# Patient Record
Sex: MALE | Race: BLACK OR AFRICAN AMERICAN | NOT HISPANIC OR LATINO | URBAN - METROPOLITAN AREA
[De-identification: names, ages, dates, MRNs, and addresses within clinical notes are randomized per-mention and may not be internally consistent; named-entity substitution may affect disease eponyms.]

---

## 2017-08-23 ENCOUNTER — INPATIENT (INPATIENT)
Facility: HOSPITAL | Age: 47
LOS: 5 days | Discharge: HOME | End: 2017-08-29
Attending: SURGERY | Admitting: SURGERY
Payer: COMMERCIAL

## 2017-08-23 VITALS
HEART RATE: 110 BPM | OXYGEN SATURATION: 98 % | DIASTOLIC BLOOD PRESSURE: 74 MMHG | TEMPERATURE: 101 F | SYSTOLIC BLOOD PRESSURE: 134 MMHG | RESPIRATION RATE: 20 BRPM

## 2017-08-23 DIAGNOSIS — Z98.890 OTHER SPECIFIED POSTPROCEDURAL STATES: Chronic | ICD-10-CM

## 2017-08-23 LAB
ALBUMIN SERPL ELPH-MCNC: 3.5 G/DL — SIGNIFICANT CHANGE UP (ref 3.3–5)
ALP SERPL-CCNC: 122 U/L — HIGH (ref 40–120)
ALT FLD-CCNC: 7 U/L — SIGNIFICANT CHANGE UP (ref 4–41)
APTT BLD: 27.6 SEC — SIGNIFICANT CHANGE UP (ref 27.5–37.4)
AST SERPL-CCNC: 9 U/L — SIGNIFICANT CHANGE UP (ref 4–40)
BASE EXCESS BLDV CALC-SCNC: 2.9 MMOL/L — SIGNIFICANT CHANGE UP
BASOPHILS # BLD AUTO: 0.01 K/UL — SIGNIFICANT CHANGE UP (ref 0–0.2)
BASOPHILS NFR BLD AUTO: 0.1 % — SIGNIFICANT CHANGE UP (ref 0–2)
BILIRUB SERPL-MCNC: 1 MG/DL — SIGNIFICANT CHANGE UP (ref 0.2–1.2)
BLD GP AB SCN SERPL QL: NEGATIVE — SIGNIFICANT CHANGE UP
BLOOD GAS VENOUS - CREATININE: 1.06 MG/DL — SIGNIFICANT CHANGE UP (ref 0.5–1.3)
BUN SERPL-MCNC: 8 MG/DL — SIGNIFICANT CHANGE UP (ref 7–23)
CALCIUM SERPL-MCNC: 9.2 MG/DL — SIGNIFICANT CHANGE UP (ref 8.4–10.5)
CHLORIDE BLDV-SCNC: 97 MMOL/L — SIGNIFICANT CHANGE UP (ref 96–108)
CHLORIDE SERPL-SCNC: 96 MMOL/L — LOW (ref 98–107)
CO2 SERPL-SCNC: 24 MMOL/L — SIGNIFICANT CHANGE UP (ref 22–31)
CREAT SERPL-MCNC: 1.21 MG/DL — SIGNIFICANT CHANGE UP (ref 0.5–1.3)
EOSINOPHIL # BLD AUTO: 0.03 K/UL — SIGNIFICANT CHANGE UP (ref 0–0.5)
EOSINOPHIL NFR BLD AUTO: 0.2 % — SIGNIFICANT CHANGE UP (ref 0–6)
GAS PNL BLDV: 135 MMOL/L — LOW (ref 136–146)
GLUCOSE BLDV-MCNC: 390 — HIGH (ref 70–99)
GLUCOSE SERPL-MCNC: 367 MG/DL — HIGH (ref 70–99)
HCO3 BLDV-SCNC: 24 MMOL/L — SIGNIFICANT CHANGE UP (ref 20–27)
HCT VFR BLD CALC: 42 % — SIGNIFICANT CHANGE UP (ref 39–50)
HCT VFR BLDV CALC: 43.9 % — SIGNIFICANT CHANGE UP (ref 39–51)
HGB BLD-MCNC: 13.7 G/DL — SIGNIFICANT CHANGE UP (ref 13–17)
HGB BLDV-MCNC: 14.3 G/DL — SIGNIFICANT CHANGE UP (ref 13–17)
IMM GRANULOCYTES # BLD AUTO: 0.16 # — SIGNIFICANT CHANGE UP
IMM GRANULOCYTES NFR BLD AUTO: 1.2 % — SIGNIFICANT CHANGE UP (ref 0–1.5)
INR BLD: 1.26 — HIGH (ref 0.88–1.17)
LACTATE BLDV-MCNC: 3.2 MMOL/L — HIGH (ref 0.5–2)
LYMPHOCYTES # BLD AUTO: 1.02 K/UL — SIGNIFICANT CHANGE UP (ref 1–3.3)
LYMPHOCYTES # BLD AUTO: 7.7 % — LOW (ref 13–44)
MCHC RBC-ENTMCNC: 28.7 PG — SIGNIFICANT CHANGE UP (ref 27–34)
MCHC RBC-ENTMCNC: 32.6 % — SIGNIFICANT CHANGE UP (ref 32–36)
MCV RBC AUTO: 88.1 FL — SIGNIFICANT CHANGE UP (ref 80–100)
MONOCYTES # BLD AUTO: 1.02 K/UL — HIGH (ref 0–0.9)
MONOCYTES NFR BLD AUTO: 7.7 % — SIGNIFICANT CHANGE UP (ref 2–14)
NEUTROPHILS # BLD AUTO: 10.98 K/UL — HIGH (ref 1.8–7.4)
NEUTROPHILS NFR BLD AUTO: 83.1 % — HIGH (ref 43–77)
NRBC # FLD: 0 — SIGNIFICANT CHANGE UP
PCO2 BLDV: 52 MMHG — HIGH (ref 41–51)
PH BLDV: 7.35 PH — SIGNIFICANT CHANGE UP (ref 7.32–7.43)
PLATELET # BLD AUTO: 166 K/UL — SIGNIFICANT CHANGE UP (ref 150–400)
PMV BLD: 12.3 FL — SIGNIFICANT CHANGE UP (ref 7–13)
PO2 BLDV: < 24 MMHG — LOW (ref 35–40)
POTASSIUM BLDV-SCNC: 3.8 MMOL/L — SIGNIFICANT CHANGE UP (ref 3.4–4.5)
POTASSIUM SERPL-MCNC: 3.7 MMOL/L — SIGNIFICANT CHANGE UP (ref 3.5–5.3)
POTASSIUM SERPL-SCNC: 3.7 MMOL/L — SIGNIFICANT CHANGE UP (ref 3.5–5.3)
PROT SERPL-MCNC: 7.9 G/DL — SIGNIFICANT CHANGE UP (ref 6–8.3)
PROTHROM AB SERPL-ACNC: 14.2 SEC — HIGH (ref 9.8–13.1)
RBC # BLD: 4.77 M/UL — SIGNIFICANT CHANGE UP (ref 4.2–5.8)
RBC # FLD: 12 % — SIGNIFICANT CHANGE UP (ref 10.3–14.5)
RH IG SCN BLD-IMP: POSITIVE — SIGNIFICANT CHANGE UP
SAO2 % BLDV: 30.1 % — LOW (ref 60–85)
SODIUM SERPL-SCNC: 137 MMOL/L — SIGNIFICANT CHANGE UP (ref 135–145)
WBC # BLD: 13.22 K/UL — HIGH (ref 3.8–10.5)
WBC # FLD AUTO: 13.22 K/UL — HIGH (ref 3.8–10.5)

## 2017-08-23 PROCEDURE — 72193 CT PELVIS W/DYE: CPT | Mod: 26

## 2017-08-23 RX ORDER — ACETAMINOPHEN 500 MG
975 TABLET ORAL ONCE
Qty: 0 | Refills: 0 | Status: COMPLETED | OUTPATIENT
Start: 2017-08-23 | End: 2017-08-23

## 2017-08-23 RX ORDER — VANCOMYCIN HCL 1 G
1000 VIAL (EA) INTRAVENOUS ONCE
Qty: 0 | Refills: 0 | Status: COMPLETED | OUTPATIENT
Start: 2017-08-23 | End: 2017-08-23

## 2017-08-23 RX ORDER — SODIUM CHLORIDE 9 MG/ML
1000 INJECTION INTRAMUSCULAR; INTRAVENOUS; SUBCUTANEOUS ONCE
Qty: 0 | Refills: 0 | Status: COMPLETED | OUTPATIENT
Start: 2017-08-23 | End: 2017-08-23

## 2017-08-23 RX ORDER — PIPERACILLIN AND TAZOBACTAM 4; .5 G/20ML; G/20ML
3.38 INJECTION, POWDER, LYOPHILIZED, FOR SOLUTION INTRAVENOUS ONCE
Qty: 0 | Refills: 0 | Status: COMPLETED | OUTPATIENT
Start: 2017-08-23 | End: 2017-08-23

## 2017-08-23 RX ADMIN — PIPERACILLIN AND TAZOBACTAM 200 GRAM(S): 4; .5 INJECTION, POWDER, LYOPHILIZED, FOR SOLUTION INTRAVENOUS at 19:58

## 2017-08-23 RX ADMIN — Medication 250 MILLIGRAM(S): at 21:02

## 2017-08-23 RX ADMIN — SODIUM CHLORIDE 4000 MILLILITER(S): 9 INJECTION INTRAMUSCULAR; INTRAVENOUS; SUBCUTANEOUS at 19:57

## 2017-08-23 RX ADMIN — Medication 975 MILLIGRAM(S): at 19:57

## 2017-08-23 RX ADMIN — SODIUM CHLORIDE 1000 MILLILITER(S): 9 INJECTION INTRAMUSCULAR; INTRAVENOUS; SUBCUTANEOUS at 23:05

## 2017-08-23 NOTE — ED PROVIDER NOTE - INGUINAL REGION
LEFT SIDE SWELLING/L indurated mass size of grapefruit extending close to penis and throughout inguinal region; some surrounding erythema; little fluctuance

## 2017-08-23 NOTE — ED PROVIDER NOTE - ATTENDING CONTRIBUTION TO CARE
48 y/o M with h/o hyperlipidemia, recently diagnosed with DM last week (supposed to start glyburide, but has not started yet) here with pain and swelling to left inner thigh.  Pt states that he has a h/o frequent abscesses.  Since Sunday noticed a small "lump" to his proximal inner thigh which has increased in size over the past 4 days.  (+)associated subjective fever and chills.  No discharge from the area.  No other rashes or lesions.  Well appearing obese male, lying comfortably in stretcher, awake and alert, nontoxic.  Febrile, tachy, normotensive.  Lungs cta bl.  Cards nl S1/S2, tachy regular, no MRG.  Abd soft ntnd.  (+)approx [id number] area of induration to proximal medial left thigh extended to left scrotum, testicles are nontender, no palpable crepitus, fluctuance, or discharge, no janny-rectal extension.  Will start ivfs, antipyretics, broad spec abx, labs, ct pelvis to eval for deep abscess vs fourniers.  Pt will require admission for cellulitis of groin region, +/- /surg pending CT results.

## 2017-08-23 NOTE — ED PROVIDER NOTE - OBJECTIVE STATEMENT
48 yo M with hx of abscesses since childhood every few years presenting with 4 day hx of abscess of L thigh. Pt reports painful, non-oozing abscess that is increasing in size for past 4 days. Pt had tactile fever at home.    Meds: cholesterol med, no DM meds    PCP: Jonelle  Pharm: [facility] [id number] ([facility] [facility])

## 2017-08-23 NOTE — ED ADULT NURSE NOTE - OBJECTIVE STATEMENT
Pt a&ox3 c/o Lt inner thigh abscess progressing in size since Sunday. Pt reports fevers started 2 days ago. Pt breathing even and unlabored. Pt denies cp/discomfort, denies headache/dizziness. Abdomen is soft, non-tender, non-distended. Skin is cool dry and intact. IVL is cool dry and intact.

## 2017-08-23 NOTE — ED PROVIDER NOTE - PROGRESS NOTE DETAILS
Pt's wife initially refusing IV vancomycin.  I spoke to the patient and his wife regarding their concerns, they state that they wanted to wait for lab work first.  I explained to the patient and his wife that he has an extensive infection in his groin and that there is a concern for a deeper infection that is potentially very aggressive.  Because of this the patient was ordered broad spectrum abx and that it is best if he receives them sooner rather than later.  The patient and wife acknowledge understanding and agree to IV vanco.

## 2017-08-23 NOTE — ED PROVIDER NOTE - MEDICAL DECISION MAKING DETAILS
46 yo M c hx of abscesses and DM presenting with 4 day hx of growing abscess in L groin. 48 yo M c hx of abscesses and DM presenting with 4 day hx of growing abscess in L groin. Exam shows extensive abscess in the groin region with induration, mild erythema around the abscess, little fluctuance. suspicious for marilee gangrene. vanc and zosyn started, blood cultures and blood work pending. Will order and reassess. 48 yo M c hx of abscesses and newly dx DM (not yet on meds) with left inner thigh cellulitis extending toward groin.  No palpable crepitus, but concern for deep abscess vs fourniers.  Plan labs, ivfs, tylenol, ct pelvis, admit, +/-

## 2017-08-24 ENCOUNTER — TRANSCRIPTION ENCOUNTER (OUTPATIENT)
Age: 47
End: 2017-08-24

## 2017-08-24 DIAGNOSIS — L03.116 CELLULITIS OF LEFT LOWER LIMB: ICD-10-CM

## 2017-08-24 DIAGNOSIS — L03.90 CELLULITIS, UNSPECIFIED: ICD-10-CM

## 2017-08-24 DIAGNOSIS — E66.9 OBESITY, UNSPECIFIED: ICD-10-CM

## 2017-08-24 DIAGNOSIS — E78.00 PURE HYPERCHOLESTEROLEMIA, UNSPECIFIED: ICD-10-CM

## 2017-08-24 DIAGNOSIS — Z29.9 ENCOUNTER FOR PROPHYLACTIC MEASURES, UNSPECIFIED: ICD-10-CM

## 2017-08-24 DIAGNOSIS — E11.628 TYPE 2 DIABETES MELLITUS WITH OTHER SKIN COMPLICATIONS: ICD-10-CM

## 2017-08-24 DIAGNOSIS — R35.0 FREQUENCY OF MICTURITION: ICD-10-CM

## 2017-08-24 DIAGNOSIS — N28.9 DISORDER OF KIDNEY AND URETER, UNSPECIFIED: ICD-10-CM

## 2017-08-24 DIAGNOSIS — R63.8 OTHER SYMPTOMS AND SIGNS CONCERNING FOOD AND FLUID INTAKE: ICD-10-CM

## 2017-08-24 LAB
ALBUMIN SERPL ELPH-MCNC: 3.1 G/DL — LOW (ref 3.3–5)
ALP SERPL-CCNC: 114 U/L — SIGNIFICANT CHANGE UP (ref 40–120)
ALT FLD-CCNC: 7 U/L — SIGNIFICANT CHANGE UP (ref 4–41)
ANISOCYTOSIS BLD QL: SLIGHT — SIGNIFICANT CHANGE UP
APPEARANCE UR: CLEAR — SIGNIFICANT CHANGE UP
AST SERPL-CCNC: 11 U/L — SIGNIFICANT CHANGE UP (ref 4–40)
BASE EXCESS BLDV CALC-SCNC: 0.5 MMOL/L — SIGNIFICANT CHANGE UP
BASOPHILS # BLD AUTO: 0.03 K/UL — SIGNIFICANT CHANGE UP (ref 0–0.2)
BASOPHILS NFR BLD AUTO: 0.2 % — SIGNIFICANT CHANGE UP (ref 0–2)
BASOPHILS NFR SPEC: 0 % — SIGNIFICANT CHANGE UP (ref 0–2)
BILIRUB SERPL-MCNC: 1 MG/DL — SIGNIFICANT CHANGE UP (ref 0.2–1.2)
BILIRUB UR-MCNC: NEGATIVE — SIGNIFICANT CHANGE UP
BLOOD GAS VENOUS - CREATININE: 1.31 MG/DL — HIGH (ref 0.5–1.3)
BLOOD UR QL VISUAL: NEGATIVE — SIGNIFICANT CHANGE UP
BUN SERPL-MCNC: 9 MG/DL — SIGNIFICANT CHANGE UP (ref 7–23)
CALCIUM SERPL-MCNC: 8.4 MG/DL — SIGNIFICANT CHANGE UP (ref 8.4–10.5)
CHLORIDE BLDV-SCNC: 101 MMOL/L — SIGNIFICANT CHANGE UP (ref 96–108)
CHLORIDE SERPL-SCNC: 98 MMOL/L — SIGNIFICANT CHANGE UP (ref 98–107)
CHOLEST SERPL-MCNC: 167 MG/DL — SIGNIFICANT CHANGE UP (ref 120–199)
CO2 SERPL-SCNC: 21 MMOL/L — LOW (ref 22–31)
COLOR SPEC: YELLOW — SIGNIFICANT CHANGE UP
CREAT SERPL-MCNC: 1.21 MG/DL — SIGNIFICANT CHANGE UP (ref 0.5–1.3)
EOSINOPHIL # BLD AUTO: 0.03 K/UL — SIGNIFICANT CHANGE UP (ref 0–0.5)
EOSINOPHIL NFR BLD AUTO: 0.2 % — SIGNIFICANT CHANGE UP (ref 0–6)
EOSINOPHIL NFR FLD: 0 % — SIGNIFICANT CHANGE UP (ref 0–6)
GAS PNL BLDV: 131 MMOL/L — LOW (ref 136–146)
GIANT PLATELETS BLD QL SMEAR: PRESENT — SIGNIFICANT CHANGE UP
GLUCOSE BLDV-MCNC: 398 — HIGH (ref 70–99)
GLUCOSE SERPL-MCNC: 368 MG/DL — HIGH (ref 70–99)
GLUCOSE UR-MCNC: >1000 — SIGNIFICANT CHANGE UP
HBA1C BLD-MCNC: 15.3 % — HIGH (ref 4–5.6)
HCO3 BLDV-SCNC: 23 MMOL/L — SIGNIFICANT CHANGE UP (ref 20–27)
HCT VFR BLD CALC: 38.5 % — LOW (ref 39–50)
HCT VFR BLDV CALC: 41.1 % — SIGNIFICANT CHANGE UP (ref 39–51)
HDLC SERPL-MCNC: 8 MG/DL — LOW (ref 35–55)
HGB BLD-MCNC: 12.6 G/DL — LOW (ref 13–17)
HGB BLDV-MCNC: 13.4 G/DL — SIGNIFICANT CHANGE UP (ref 13–17)
IMM GRANULOCYTES # BLD AUTO: 0.36 # — SIGNIFICANT CHANGE UP
IMM GRANULOCYTES NFR BLD AUTO: 1.9 % — HIGH (ref 0–1.5)
KETONES UR-MCNC: SIGNIFICANT CHANGE UP
LACTATE BLDV-MCNC: 2.1 MMOL/L — HIGH (ref 0.5–2)
LEUKOCYTE ESTERASE UR-ACNC: NEGATIVE — SIGNIFICANT CHANGE UP
LIPID PNL WITH DIRECT LDL SERPL: 104 MG/DL — SIGNIFICANT CHANGE UP
LYMPHOCYTES # BLD AUTO: 1.44 K/UL — SIGNIFICANT CHANGE UP (ref 1–3.3)
LYMPHOCYTES # BLD AUTO: 7.6 % — LOW (ref 13–44)
LYMPHOCYTES NFR SPEC AUTO: 10.6 % — LOW (ref 13–44)
MACROCYTES BLD QL: SLIGHT — SIGNIFICANT CHANGE UP
MAGNESIUM SERPL-MCNC: 1.5 MG/DL — LOW (ref 1.6–2.6)
MCHC RBC-ENTMCNC: 28.8 PG — SIGNIFICANT CHANGE UP (ref 27–34)
MCHC RBC-ENTMCNC: 32.7 % — SIGNIFICANT CHANGE UP (ref 32–36)
MCV RBC AUTO: 88.1 FL — SIGNIFICANT CHANGE UP (ref 80–100)
METAMYELOCYTES # FLD: 0.9 % — SIGNIFICANT CHANGE UP (ref 0–1)
MONOCYTES # BLD AUTO: 1.81 K/UL — HIGH (ref 0–0.9)
MONOCYTES NFR BLD AUTO: 9.5 % — SIGNIFICANT CHANGE UP (ref 2–14)
MONOCYTES NFR BLD: 13.3 % — HIGH (ref 2–9)
MUCOUS THREADS # UR AUTO: SIGNIFICANT CHANGE UP
NEUTROPHIL AB SER-ACNC: 54 % — SIGNIFICANT CHANGE UP (ref 43–77)
NEUTROPHILS # BLD AUTO: 15.29 K/UL — HIGH (ref 1.8–7.4)
NEUTROPHILS NFR BLD AUTO: 80.6 % — HIGH (ref 43–77)
NEUTS BAND # BLD: 21.2 % — HIGH (ref 0–6)
NITRITE UR-MCNC: NEGATIVE — SIGNIFICANT CHANGE UP
NRBC # FLD: 0 — SIGNIFICANT CHANGE UP
PCO2 BLDV: 46 MMHG — SIGNIFICANT CHANGE UP (ref 41–51)
PH BLDV: 7.36 PH — SIGNIFICANT CHANGE UP (ref 7.32–7.43)
PH UR: 6 — SIGNIFICANT CHANGE UP (ref 4.6–8)
PHOSPHATE SERPL-MCNC: 3.6 MG/DL — SIGNIFICANT CHANGE UP (ref 2.5–4.5)
PLATELET # BLD AUTO: 182 K/UL — SIGNIFICANT CHANGE UP (ref 150–400)
PLATELET COUNT - ESTIMATE: NORMAL — SIGNIFICANT CHANGE UP
PMV BLD: 12.4 FL — SIGNIFICANT CHANGE UP (ref 7–13)
PO2 BLDV: 27 MMHG — LOW (ref 35–40)
POTASSIUM BLDV-SCNC: 3.9 MMOL/L — SIGNIFICANT CHANGE UP (ref 3.4–4.5)
POTASSIUM SERPL-MCNC: 3.9 MMOL/L — SIGNIFICANT CHANGE UP (ref 3.5–5.3)
POTASSIUM SERPL-SCNC: 3.9 MMOL/L — SIGNIFICANT CHANGE UP (ref 3.5–5.3)
PROT SERPL-MCNC: 7.1 G/DL — SIGNIFICANT CHANGE UP (ref 6–8.3)
PROT UR-MCNC: 20 — SIGNIFICANT CHANGE UP
RBC # BLD: 4.37 M/UL — SIGNIFICANT CHANGE UP (ref 4.2–5.8)
RBC # FLD: 12.1 % — SIGNIFICANT CHANGE UP (ref 10.3–14.5)
RBC CASTS # UR COMP ASSIST: SIGNIFICANT CHANGE UP (ref 0–?)
SAO2 % BLDV: 36.2 % — LOW (ref 60–85)
SODIUM SERPL-SCNC: 134 MMOL/L — LOW (ref 135–145)
SP GR SPEC: 1.04 — HIGH (ref 1–1.03)
SPECIMEN SOURCE: SIGNIFICANT CHANGE UP
SPECIMEN SOURCE: SIGNIFICANT CHANGE UP
SQUAMOUS # UR AUTO: SIGNIFICANT CHANGE UP
TRIGL SERPL-MCNC: 206 MG/DL — HIGH (ref 10–149)
TSH SERPL-MCNC: 2 UIU/ML — SIGNIFICANT CHANGE UP (ref 0.27–4.2)
UROBILINOGEN FLD QL: NORMAL E.U. — SIGNIFICANT CHANGE UP (ref 0.1–0.2)
WBC # BLD: 18.96 K/UL — HIGH (ref 3.8–10.5)
WBC # FLD AUTO: 18.96 K/UL — HIGH (ref 3.8–10.5)
WBC UR QL: SIGNIFICANT CHANGE UP (ref 0–?)

## 2017-08-24 PROCEDURE — 99233 SBSQ HOSP IP/OBS HIGH 50: CPT

## 2017-08-24 PROCEDURE — 12345: CPT | Mod: NC

## 2017-08-24 PROCEDURE — 99255 IP/OBS CONSLTJ NEW/EST HI 80: CPT

## 2017-08-24 RX ORDER — INSULIN LISPRO 100/ML
VIAL (ML) SUBCUTANEOUS AT BEDTIME
Qty: 0 | Refills: 0 | Status: DISCONTINUED | OUTPATIENT
Start: 2017-08-24 | End: 2017-08-24

## 2017-08-24 RX ORDER — INSULIN LISPRO 100/ML
VIAL (ML) SUBCUTANEOUS
Qty: 0 | Refills: 0 | Status: DISCONTINUED | OUTPATIENT
Start: 2017-08-24 | End: 2017-08-25

## 2017-08-24 RX ORDER — INSULIN LISPRO 100/ML
8 VIAL (ML) SUBCUTANEOUS
Qty: 0 | Refills: 0 | Status: DISCONTINUED | OUTPATIENT
Start: 2017-08-24 | End: 2017-08-25

## 2017-08-24 RX ORDER — SIMVASTATIN 20 MG/1
20 TABLET, FILM COATED ORAL AT BEDTIME
Qty: 0 | Refills: 0 | Status: DISCONTINUED | OUTPATIENT
Start: 2017-08-24 | End: 2017-08-29

## 2017-08-24 RX ORDER — SODIUM CHLORIDE 9 MG/ML
1000 INJECTION INTRAMUSCULAR; INTRAVENOUS; SUBCUTANEOUS ONCE
Qty: 0 | Refills: 0 | Status: COMPLETED | OUTPATIENT
Start: 2017-08-24 | End: 2017-08-24

## 2017-08-24 RX ORDER — PIPERACILLIN AND TAZOBACTAM 4; .5 G/20ML; G/20ML
3.38 INJECTION, POWDER, LYOPHILIZED, FOR SOLUTION INTRAVENOUS EVERY 8 HOURS
Qty: 0 | Refills: 0 | Status: DISCONTINUED | OUTPATIENT
Start: 2017-08-24 | End: 2017-08-27

## 2017-08-24 RX ORDER — SODIUM CHLORIDE 9 MG/ML
1000 INJECTION INTRAMUSCULAR; INTRAVENOUS; SUBCUTANEOUS
Qty: 0 | Refills: 0 | Status: DISCONTINUED | OUTPATIENT
Start: 2017-08-24 | End: 2017-08-25

## 2017-08-24 RX ORDER — DEXTROSE 50 % IN WATER 50 %
1 SYRINGE (ML) INTRAVENOUS ONCE
Qty: 0 | Refills: 0 | Status: DISCONTINUED | OUTPATIENT
Start: 2017-08-24 | End: 2017-08-25

## 2017-08-24 RX ORDER — SODIUM CHLORIDE 9 MG/ML
1000 INJECTION, SOLUTION INTRAVENOUS
Qty: 0 | Refills: 0 | Status: DISCONTINUED | OUTPATIENT
Start: 2017-08-24 | End: 2017-08-25

## 2017-08-24 RX ORDER — INSULIN GLARGINE 100 [IU]/ML
25 INJECTION, SOLUTION SUBCUTANEOUS AT BEDTIME
Qty: 0 | Refills: 0 | Status: DISCONTINUED | OUTPATIENT
Start: 2017-08-24 | End: 2017-08-25

## 2017-08-24 RX ORDER — OXYCODONE AND ACETAMINOPHEN 5; 325 MG/1; MG/1
1 TABLET ORAL EVERY 4 HOURS
Qty: 0 | Refills: 0 | Status: DISCONTINUED | OUTPATIENT
Start: 2017-08-24 | End: 2017-08-29

## 2017-08-24 RX ORDER — DEXTROSE 50 % IN WATER 50 %
12.5 SYRINGE (ML) INTRAVENOUS ONCE
Qty: 0 | Refills: 0 | Status: DISCONTINUED | OUTPATIENT
Start: 2017-08-24 | End: 2017-08-25

## 2017-08-24 RX ORDER — DEXTROSE 50 % IN WATER 50 %
25 SYRINGE (ML) INTRAVENOUS ONCE
Qty: 0 | Refills: 0 | Status: DISCONTINUED | OUTPATIENT
Start: 2017-08-24 | End: 2017-08-25

## 2017-08-24 RX ORDER — GLUCAGON INJECTION, SOLUTION 0.5 MG/.1ML
1 INJECTION, SOLUTION SUBCUTANEOUS ONCE
Qty: 0 | Refills: 0 | Status: DISCONTINUED | OUTPATIENT
Start: 2017-08-24 | End: 2017-08-25

## 2017-08-24 RX ORDER — HEPARIN SODIUM 5000 [USP'U]/ML
5000 INJECTION INTRAVENOUS; SUBCUTANEOUS EVERY 12 HOURS
Qty: 0 | Refills: 0 | Status: DISCONTINUED | OUTPATIENT
Start: 2017-08-24 | End: 2017-08-25

## 2017-08-24 RX ORDER — INSULIN LISPRO 100/ML
VIAL (ML) SUBCUTANEOUS
Qty: 0 | Refills: 0 | Status: DISCONTINUED | OUTPATIENT
Start: 2017-08-24 | End: 2017-08-24

## 2017-08-24 RX ORDER — INSULIN LISPRO 100/ML
VIAL (ML) SUBCUTANEOUS AT BEDTIME
Qty: 0 | Refills: 0 | Status: DISCONTINUED | OUTPATIENT
Start: 2017-08-24 | End: 2017-08-25

## 2017-08-24 RX ORDER — VANCOMYCIN HCL 1 G
1000 VIAL (EA) INTRAVENOUS EVERY 24 HOURS
Qty: 0 | Refills: 0 | Status: DISCONTINUED | OUTPATIENT
Start: 2017-08-24 | End: 2017-08-25

## 2017-08-24 RX ORDER — ACETAMINOPHEN 500 MG
650 TABLET ORAL EVERY 6 HOURS
Qty: 0 | Refills: 0 | Status: DISCONTINUED | OUTPATIENT
Start: 2017-08-24 | End: 2017-08-29

## 2017-08-24 RX ORDER — INSULIN LISPRO 100/ML
7 VIAL (ML) SUBCUTANEOUS ONCE
Qty: 0 | Refills: 0 | Status: COMPLETED | OUTPATIENT
Start: 2017-08-24 | End: 2017-08-24

## 2017-08-24 RX ORDER — VANCOMYCIN HCL 1 G
1000 VIAL (EA) INTRAVENOUS EVERY 12 HOURS
Qty: 0 | Refills: 0 | Status: DISCONTINUED | OUTPATIENT
Start: 2017-08-24 | End: 2017-08-24

## 2017-08-24 RX ADMIN — Medication 8 UNIT(S): at 13:08

## 2017-08-24 RX ADMIN — Medication 250 MILLIGRAM(S): at 20:56

## 2017-08-24 RX ADMIN — HEPARIN SODIUM 5000 UNIT(S): 5000 INJECTION INTRAVENOUS; SUBCUTANEOUS at 05:42

## 2017-08-24 RX ADMIN — SODIUM CHLORIDE 100 MILLILITER(S): 9 INJECTION INTRAMUSCULAR; INTRAVENOUS; SUBCUTANEOUS at 04:19

## 2017-08-24 RX ADMIN — SODIUM CHLORIDE 1000 MILLILITER(S): 9 INJECTION INTRAMUSCULAR; INTRAVENOUS; SUBCUTANEOUS at 03:10

## 2017-08-24 RX ADMIN — HEPARIN SODIUM 5000 UNIT(S): 5000 INJECTION INTRAVENOUS; SUBCUTANEOUS at 18:04

## 2017-08-24 RX ADMIN — PIPERACILLIN AND TAZOBACTAM 25 GRAM(S): 4; .5 INJECTION, POWDER, LYOPHILIZED, FOR SOLUTION INTRAVENOUS at 13:07

## 2017-08-24 RX ADMIN — Medication 4: at 09:09

## 2017-08-24 RX ADMIN — OXYCODONE AND ACETAMINOPHEN 1 TABLET(S): 5; 325 TABLET ORAL at 02:28

## 2017-08-24 RX ADMIN — OXYCODONE AND ACETAMINOPHEN 1 TABLET(S): 5; 325 TABLET ORAL at 09:40

## 2017-08-24 RX ADMIN — Medication 3: at 13:08

## 2017-08-24 RX ADMIN — Medication 2: at 17:58

## 2017-08-24 RX ADMIN — OXYCODONE AND ACETAMINOPHEN 1 TABLET(S): 5; 325 TABLET ORAL at 20:40

## 2017-08-24 RX ADMIN — OXYCODONE AND ACETAMINOPHEN 1 TABLET(S): 5; 325 TABLET ORAL at 21:10

## 2017-08-24 RX ADMIN — Medication 7 UNIT(S): at 00:59

## 2017-08-24 RX ADMIN — OXYCODONE AND ACETAMINOPHEN 1 TABLET(S): 5; 325 TABLET ORAL at 09:09

## 2017-08-24 RX ADMIN — SODIUM CHLORIDE 1000 MILLILITER(S): 9 INJECTION INTRAMUSCULAR; INTRAVENOUS; SUBCUTANEOUS at 00:59

## 2017-08-24 RX ADMIN — Medication 8 UNIT(S): at 09:09

## 2017-08-24 RX ADMIN — Medication 1: at 22:04

## 2017-08-24 RX ADMIN — OXYCODONE AND ACETAMINOPHEN 1 TABLET(S): 5; 325 TABLET ORAL at 02:58

## 2017-08-24 RX ADMIN — INSULIN GLARGINE 25 UNIT(S): 100 INJECTION, SOLUTION SUBCUTANEOUS at 22:00

## 2017-08-24 RX ADMIN — SIMVASTATIN 20 MILLIGRAM(S): 20 TABLET, FILM COATED ORAL at 22:00

## 2017-08-24 RX ADMIN — PIPERACILLIN AND TAZOBACTAM 25 GRAM(S): 4; .5 INJECTION, POWDER, LYOPHILIZED, FOR SOLUTION INTRAVENOUS at 04:00

## 2017-08-24 RX ADMIN — Medication 8 UNIT(S): at 17:59

## 2017-08-24 RX ADMIN — PIPERACILLIN AND TAZOBACTAM 25 GRAM(S): 4; .5 INJECTION, POWDER, LYOPHILIZED, FOR SOLUTION INTRAVENOUS at 22:00

## 2017-08-24 NOTE — DISCHARGE NOTE ADULT - CARE PLAN
Principal Discharge DX:	Cellulitis of left lower extremity  Goal:	resolution  Instructions for follow-up, activity and diet:	Please continue with antibiotics as directed, until completed.  Monitor for any worsening redness, swelling, pain, or fevers.  Secondary Diagnosis:	Type 2 diabetes mellitus with other skin complication, without long-term current use of insulin  Instructions for follow-up, activity and diet:	Your a1c is 15.3.  Please continue ************ Principal Discharge DX:	Cellulitis of left lower extremity  Goal:	resolution  Instructions for follow-up, activity and diet:	Please continue with antibiotics as directed, until completed.  Monitor for any worsening redness, swelling, pain, or fevers.  Do not remove drain, change packing daily for dry gauze, follow up with Dr. Adams at outpatient  Secondary Diagnosis:	Type 2 diabetes mellitus with other skin complication, without long-term current use of insulin  Instructions for follow-up, activity and diet:	Your a1c is 15.3, Do not take your previous diabetes medications  Check your fingerstick in the morning and before meals and at bedtime  Lantus to 33u at bedtime  Humalog to 10u before meals  Would increase Lantus to 33u qhs.  Increase Humalog to 11u TID  - c/w moderate correction scale  Continue to reinforce with patient the need for insulin at discharge.  Prefer basal bolus, less optimal basal plus orals. Orals only at dc not recommended. Continue to educate patient.  Outpatient endocrine follow up [phone]. Principal Discharge DX:	Cellulitis of left lower extremity  Goal:	resolution  Instructions for follow-up, activity and diet:	Please continue with antibiotics as directed, until completed.  Monitor for any worsening redness, swelling, pain, or fevers.  Do not remove drain, change packing daily for dry gauze, follow up with Dr. Adams at outpatient  Secondary Diagnosis:	Type 2 diabetes mellitus with other skin complication, without long-term current use of insulin  Instructions for follow-up, activity and diet:	Your a1c is 15.3, Do not take your previous diabetes medications  Check your fingerstick in the morning and before meals and at bedtime, do not take insulin if fingerstick is less than 120  Lantus to 33u at bedtime  Humalog to 10u before meals  Would increase Lantus to 33u qhs.  Increase Humalog to 11u TID  - c/w moderate correction scale  Continue to reinforce with patient the need for insulin at discharge.  Prefer basal bolus, less optimal basal plus orals. Orals only at dc not recommended. Continue to educate patient.  Outpatient endocrine follow up [phone].

## 2017-08-24 NOTE — H&P ADULT - PROBLEM SELECTOR PLAN 6
- heparin for now; discontinue when patient adequately ambulatory - started on anti-lipid medication one week ago, but patient unable to recall name  - will start zocor 20 mg HS

## 2017-08-24 NOTE — DISCHARGE NOTE ADULT - PLAN OF CARE
resolution Your a1c is 15.3.  Please continue ************ Please continue with antibiotics as directed, until completed.  Monitor for any worsening redness, swelling, pain, or fevers. Your a1c is 15.3, Do not take your previous diabetes medications  Check your fingerstick in the morning and before meals and at bedtime  Lantus to 33u at bedtime  Humalog to 10u before meals  Would increase Lantus to 33u qhs.  Increase Humalog to 11u TID  - c/w moderate correction scale  Continue to reinforce with patient the need for insulin at discharge.  Prefer basal bolus, less optimal basal plus orals. Orals only at dc not recommended. Continue to educate patient.  Outpatient endocrine follow up [phone]. Please continue with antibiotics as directed, until completed.  Monitor for any worsening redness, swelling, pain, or fevers.  Do not remove drain, change packing daily for dry gauze, follow up with Dr. Adams at outpatient Your a1c is 15.3, Do not take your previous diabetes medications  Check your fingerstick in the morning and before meals and at bedtime, do not take insulin if fingerstick is less than 120  Lantus to 33u at bedtime  Humalog to 10u before meals  Would increase Lantus to 33u qhs.  Increase Humalog to 11u TID  - c/w moderate correction scale  Continue to reinforce with patient the need for insulin at discharge.  Prefer basal bolus, less optimal basal plus orals. Orals only at dc not recommended. Continue to educate patient.  Outpatient endocrine follow up [phone].

## 2017-08-24 NOTE — H&P ADULT - PROBLEM SELECTOR PLAN 5
- started on anti-lipid medication one week ago, but patient unable to recall name  - will start zocor 20 mg HS - has been loosing weight intentionally; weight loss of ~ 34 pounds since 04/2017  - suspicion for GAURAV because of patient's body habitus, report of snoring and daily tiredness; consider further evaluation for same  - continue to encourage healthy weight loss (should also help with DM and suspected GAURAV, general wellbeing); consider nutrition consult  -

## 2017-08-24 NOTE — H&P ADULT - PROBLEM SELECTOR PLAN 2
- reports being diagnosed ~ 1 week ago, but meds not yet started (called by pharmacy while in ED, per patient)  - blood glucose = 367, AG = 17 (estimated HgbA1c ~ 15)  - report of polydipsia and polyuria during recent past  - significant family history of DM  - s/p humalog 15 units in the ED  - will start moderate dose ISS per FS  - may need to add basal insulin  - patient voices reluctance for home insulin usage because of fear of puncturing his skin with needles (will need teaching if insulin required upon discharge)  - IVF hydration - s/p 2 liters NS in the ED; additional 2 liters prescribed - along with maintenance thereafter  - f/u HfbA1c level in the AM  - consider endocrinology consult, if necessary

## 2017-08-24 NOTE — H&P ADULT - PROBLEM SELECTOR PLAN 3
- creatinine = 1.21  - in the setting of elevated blood glucose level, dehydration  - IVF as above  - f/u for resolution with repeat lab-work in the AM  - f/u urine output

## 2017-08-24 NOTE — PROGRESS NOTE ADULT - ATTENDING COMMENTS
Addendum:  spoke to pt and spouse at bedside regarding diabetes and infection.  Wife is supporting the pt's decision not to use insulin at this time.  Explained the risks of uncontrolled diabetes including MI, stroke, cataracts, renal failure and amputations.  Pt also asking for discharge tomorrow - explained that the area of induration/tenderness in his groin may evolve into fluctuance and may need to be drained.  Pt is adamant that he be discharged tomorrow.  Will continue to follow. Addendum:  spoke to pt and spouse at bedside regarding diabetes and infection.  Wife is supporting the pt's decision not to use insulin at this time.  Explained the risks of uncontrolled diabetes including MI, stroke, cataracts, renal failure and amputations.  Pt also asking for discharge tomorrow - explained that the area of induration/tenderness in his groin may evolve into fluctuance and may need to be drained.  Pt is adamant that he be discharged tomorrow.  face to face with pt and family for 45 mins explaining the need for insulin and antibiotics.  Will continue to follow.

## 2017-08-24 NOTE — H&P ADULT - PROBLEM SELECTOR PLAN 4
- has been loosing weight intentionally; weight loss of ~ 34 pounds since 04/2017  - suspicion for GAURAV because of patient's body habitus, report of snoring and daily tiredness; consider further evaluation for same  - continue to encourage healthy weight loss (should also help with DM and suspected GAURAV, general wellbeing); consider nutrition consult  - - most likely due to Type-2 DM with elevated blood glucose levels  - but patient also with mild pain on micturition  - ?mild CVA tenderness on right  - urinalysis ordered (already on antibiotic, in the event of infection positivity)

## 2017-08-24 NOTE — H&P ADULT - PROBLEM SELECTOR PLAN 7
- regular - consistent carb diet  - IVF hydration - heparin for now; discontinue when patient adequately ambulatory

## 2017-08-24 NOTE — DISCHARGE NOTE ADULT - PATIENT PORTAL LINK FT
“You can access the [employer] Patient Portal, offered by [facility], by registering with the following website: [email or web address]”

## 2017-08-24 NOTE — PROGRESS NOTE ADULT - PROBLEM SELECTOR PLAN 2
- continue with basal bolus regimen in hospital  - DM education and nutrition consult   - pt adamantly refusing to be on insulin at home  - f/u endo recs

## 2017-08-24 NOTE — H&P ADULT - NSHPLABSRESULTS_GEN_ALL_CORE
13.7   13.22 )-----------( 166      ( 23 Aug 2017 19:34 )             42.0       08-23    137  |  96<L>  |  8   ----------------------------<  367<H>  3.7   |  24  |  1.21    Ca    9.2      23 Aug 2017 19:34    TPro  7.9  /  Alb  3.5  /  TBili  1.0  /  DBili  x   /  AST  9   /  ALT  7   /  AlkPhos  122<H>  08-23      00:09 - VBG - pH: 7.36  | pCO2: 46    | pO2: 27    | Lactate: 2.1    19:34 - VBG - pH: 7.35  | pCO2: 52    | pO2: < 24  | Lactate: 3.2    CT of PELVIS  IMPRESSION:   Moderate soft tissue swelling and subcutaneous inflammatory change in the   left groin likely representing a cellulitis. No associated focal fluid   collection to suggest an abscess. No associated subcutaneous gas. 13.7   13.22 )-----------( 166      ( 23 Aug 2017 19:34 )             42.0       08-23    137  |  96<L>  |  8   ----------------------------<  367<H>  3.7   |  24  |  1.21    Ca    9.2      23 Aug 2017 19:34    TPro  7.9  /  Alb  3.5  /  TBili  1.0  /  DBili  x   /  AST  9   /  ALT  7   /  AlkPhos  122<H>  08-23      00:09 - VBG - pH: 7.36  | pCO2: 46    | pO2: 27    | Lactate: 2.1    19:34 - VBG - pH: 7.35  | pCO2: 52    | pO2: < 24  | Lactate: 3.2      ECG = Sinus tachycardia at 105 bpm, QTc = 473      CT of PELVIS  IMPRESSION:   Moderate soft tissue swelling and subcutaneous inflammatory change in the   left groin likely representing a cellulitis. No associated focal fluid   collection to suggest an abscess. No associated subcutaneous gas.

## 2017-08-24 NOTE — H&P ADULT - ASSESSMENT
47 year old male, with past history significant for Abscesses, DM, HLD, R-Knee arthroscopy, presented to the ED secondary to left pain and swelling of the left thigh.  Vital signs upon ED presentation as follows: BP = 134/74 (to low of 95/54), HR = 110, RR = 20, T = 38.3 C (100.9 F), O2 Sat = 99% on RA.  CT scan of pelvis significant for "Moderate soft tissue swelling and subcutaneous inflammatory change in the left groin likely representing a cellulitis. No associated focal fluid collection to suggest an abscess. No associated subcutaneous gas."  Diagnosed with Cellulitis.  Admitted for further management of 47 year old male, with past history significant for Abscesses, DM, HLD, R-Knee arthroscopy, presented to the ED secondary to left pain and swelling of the left thigh.  Vital signs upon ED presentation as follows: BP = 134/74 (to low of 95/54), HR = 110, RR = 20, T = 38.3 C (100.9 F), O2 Sat = 99% on RA.  CT scan of pelvis significant for "Moderate soft tissue swelling and subcutaneous inflammatory change in the left groin likely representing a cellulitis. No associated focal fluid collection to suggest an abscess. No associated subcutaneous gas."  Diagnosed with Cellulitis.  Admitted for further management of Cellulitis of LLE, Type-2 DM with other skin complication, Renal insufficiency, Frequency of micturition, Obesity, Hyperlipidemia...

## 2017-08-24 NOTE — DISCHARGE NOTE ADULT - HOSPITAL COURSE
47 male who has hyperlipidemia who presented to the ED at [facility] at approximately 5:30 PM on 8/23/17 with complaints of having left groin pain. He had fever, tachycardia, hypotension, leukocytosis, and hyperglycemia. He had left groin cellulitis and subsequently was diagnosed as having multiple contiguous subcutaneous abscesses. He underwent incision and drainage, irrigation and debridement of the left groin on 8/25/17 and was found to have a sensitive Strep anginosus in the abscess. He was treated with zosyn and vancomycin and earlier today was changed to Unasyn. He is afebrile without a leukocytosis and his glucose control has improved. He has been followed by endocrine who recommended 33 U of lantus at bedtime and 11 U of humalog premeal. Patient wife is nurse and will check on patients fingersticks and administer insulin. He is to be discharged today with outpatient follow up.

## 2017-08-24 NOTE — DISCHARGE NOTE ADULT - PROVIDER TOKENS
FREE:[LAST:[PCP],PHONE:[(   )    -],FAX:[(   )    -],ADDRESS:[Dr Sal]] FREE:[LAST:[PCP],PHONE:[(   )    -],FAX:[(   )    -],ADDRESS:[Dr Sal]],TOKEN:'[id number]'

## 2017-08-24 NOTE — H&P ADULT - HISTORY OF PRESENT ILLNESS
47 year old male, with past history significant for Abscesses, DM, HLD, R-Knee arthroscopy, presented to the ED secondary to left pain and swelling of the left thigh.  Seen and evaluated at bedside;       Vital signs upon ED presentation as follows: BP = 134/74 (to low of 95/54), HR = 110, RR = 20, T = 38.3 C (100.9 F), O2 Sat = 99% on RA.  CT scan of pelvis significant for "Moderate soft tissue swelling and subcutaneous inflammatory change in the left groin likely representing a cellulitis. No associated focal fluid collection to suggest an abscess. No associated subcutaneous gas."  Diagnosed with Cellulitis.  Prescribed zosyn 3.375 grams, vancomycin 1 gram and NaCl 2 liters bolus in the ED. 47 year old male, with past history significant for Abscesses, DM, HLD, R-Knee arthroscopy, presented to the ED secondary to left pain and swelling of the left thigh.  Seen and evaluated at bedside; NAD, but appears to be experiencing some discomfort from left groin pain.  Patient reports that on Sunday, he noted a swelling (just bigger than a quarter) at the left upper groin area.  Patient thought that it was the usual type of boil and expected it to mature to the point of him being able to puncture it an expel the pus, as he has done with similar swellings in the past; the last being during 2016.  However, on Monday patient went to work and felt increasing discomfort; upon checking, he realized that the swelling had approximately quadrupled in size, was extremely painful (to the extent of limiting normal gait), and he began to have subjective fevers.  Patient called in to work on Tuesday because of worsening condition; pain, fevers, chills, sweating, headaches, body aches.  Unable to identify any inciting factor, but surmises that it could have started from a snagged hair.  No prior admissions for same.    Reports being diagnosed with Type-2 Diabetes mellitus one week ago, but has not started on a medication since he was called since coming to the ED.  Noted increased thirst and polyuria for some time.  Was started on an anti-lipid medication last week and has taken 3 tablets thus far.  Has had intentional weight loss of approximately 34 pounds since April 2017 (previously weight 336 pounds, and now weighs 302 pounds).    Vital signs upon ED presentation as follows: BP = 134/74 (to low of 95/54), HR = 110, RR = 20, T = 38.3 C (100.9 F), O2 Sat = 99% on RA.  CT scan of pelvis significant for "Moderate soft tissue swelling and subcutaneous inflammatory change in the left groin likely representing a cellulitis. No associated focal fluid collection to suggest an abscess. No associated subcutaneous gas."  Diagnosed with Cellulitis.  Prescribed zosyn 3.375 grams, vancomycin 1 gram and NaCl 2 liters bolus in the ED.

## 2017-08-24 NOTE — PROGRESS NOTE ADULT - SUBJECTIVE AND OBJECTIVE BOX
Patient is a 47y old  Male who presents with a chief complaint of Pain and swelling of the left thigh, as well as fevers, chills and sweating (24 Aug 2017 12:19)      SUBJECTIVE / OVERNIGHT EVENTS:  still with pain in Left upper inner thigh - feels the swelling has slightly decreased.  Pt is adamant about not starting insulin.      MEDICATIONS  (STANDING):  sodium chloride 0.9%. 1000 milliLiter(s) (100 mL/Hr) IV Continuous <Continuous>  piperacillin/tazobactam IVPB. 3.375 Gram(s) IV Intermittent every 8 hours  vancomycin  IVPB 1000 milliGRAM(s) IV Intermittent every 24 hours  dextrose 5%. 1000 milliLiter(s) (50 mL/Hr) IV Continuous <Continuous>  dextrose 50% Injectable 12.5 Gram(s) IV Push once  dextrose 50% Injectable 25 Gram(s) IV Push once  dextrose 50% Injectable 25 Gram(s) IV Push once  simvastatin 20 milliGRAM(s) Oral at bedtime  heparin  Injectable 5000 Unit(s) SubCutaneous every 12 hours  insulin glargine Injectable (LANTUS) 25 Unit(s) SubCutaneous at bedtime  insulin lispro Injectable (HumaLOG) 8 Unit(s) SubCutaneous three times a day before meals  insulin lispro (HumaLOG) corrective regimen sliding scale   SubCutaneous three times a day before meals  insulin lispro (HumaLOG) corrective regimen sliding scale   SubCutaneous at bedtime    MEDICATIONS  (PRN):  acetaminophen   Tablet. 650 milliGRAM(s) Oral every 6 hours PRN Mild Pain (1 - 3)  oxyCODONE    5 mG/acetaminophen 325 mG 1 Tablet(s) Oral every 4 hours PRN Moderate Pain (4 - 6)  acetaminophen   Tablet 650 milliGRAM(s) Oral every 6 hours PRN For Temp greater than 38 C (100.4 F)  dextrose Gel 1 Dose(s) Oral once PRN Blood Glucose LESS THAN 70 milliGRAM(s)/deciliter  glucagon  Injectable 1 milliGRAM(s) IntraMuscular once PRN Glucose LESS THAN 70 milligrams/deciliter      Vital Signs Last 24 Hrs  T(C): 36.9 (24 Aug 2017 13:01), Max: 38.3 (23 Aug 2017 17:33)  T(F): 98.5 (24 Aug 2017 13:01), Max: 100.9 (23 Aug 2017 17:33)  HR: 95 (24 Aug 2017 13:01) (92 - 110)  BP: 96/59 (24 Aug 2017 13:01) (92/63 - 134/74)  BP(mean): --  RR: 18 (24 Aug 2017 13:01) (16 - 20)  SpO2: 95% (24 Aug 2017 13:01) (95% - 100%)  CAPILLARY BLOOD GLUCOSE  264 (24 Aug 2017 11:51)        PHYSICAL EXAM:  GENERAL: NAD, well-developed, obese   HEAD:  Atraumatic, Normocephalic  EYES: EOMI, conjunctiva and sclera clear  NECK: Supple, No JVD  CHEST/LUNG: Clear to auscultation bilaterally; No wheeze  HEART: Regular rate and rhythm; No murmurs  ABDOMEN: Soft, Nontender, Nondistended; Bowel sounds present  EXTREMITIES:  2+ Peripheral Pulses, No edema; L upper inner thigh w/ swelling/warmth and mild tenderness to palpation   PSYCH: AAOx3  NEUROLOGY: non-focal  SKIN: No rashes or lesions    LABS:                        12.6   18.96 )-----------( 182      ( 24 Aug 2017 05:51 )             38.5     08-24    134<L>  |  98  |  9   ----------------------------<  368<H>  3.9   |  21<L>  |  1.21    Ca    8.4      24 Aug 2017 05:51  Phos  3.6     08-24  Mg     1.5     08-24    TPro  7.1  /  Alb  3.1<L>  /  TBili  1.0  /  DBili  x   /  AST  11  /  ALT  7   /  AlkPhos  114  08-24    PT/INR - ( 23 Aug 2017 19:34 )   PT: 14.2 SEC;   INR: 1.26          PTT - ( 23 Aug 2017 19:34 )  PTT:27.6 SEC      Urinalysis Basic - ( 24 Aug 2017 05:30 )    Color: YELLOW / Appearance: CLEAR / S.042 / pH: 6.0  Gluc: >1000 / Ketone: MODERATE  / Bili: NEGATIVE / Urobili: NORMAL E.U.   Blood: NEGATIVE / Protein: 20 / Nitrite: NEGATIVE   Leuk Esterase: NEGATIVE / RBC: 0-2 / WBC 2-5   Sq Epi: RARE / Non Sq Epi: x / Bacteria: x Patient is a 47y old  Male who presents with a chief complaint of Pain and swelling of the left thigh, as well as fevers, chills and sweating (24 Aug 2017 12:19)      SUBJECTIVE / OVERNIGHT EVENTS:  still with pain in Left upper inner thigh - feels the swelling has slightly decreased.  Pt is adamant about not starting insulin.      MEDICATIONS  (STANDING):  sodium chloride 0.9%. 1000 milliLiter(s) (100 mL/Hr) IV Continuous <Continuous>  piperacillin/tazobactam IVPB. 3.375 Gram(s) IV Intermittent every 8 hours  vancomycin  IVPB 1000 milliGRAM(s) IV Intermittent every 24 hours  dextrose 5%. 1000 milliLiter(s) (50 mL/Hr) IV Continuous <Continuous>  dextrose 50% Injectable 12.5 Gram(s) IV Push once  dextrose 50% Injectable 25 Gram(s) IV Push once  dextrose 50% Injectable 25 Gram(s) IV Push once  simvastatin 20 milliGRAM(s) Oral at bedtime  heparin  Injectable 5000 Unit(s) SubCutaneous every 12 hours  insulin glargine Injectable (LANTUS) 25 Unit(s) SubCutaneous at bedtime  insulin lispro Injectable (HumaLOG) 8 Unit(s) SubCutaneous three times a day before meals  insulin lispro (HumaLOG) corrective regimen sliding scale   SubCutaneous three times a day before meals  insulin lispro (HumaLOG) corrective regimen sliding scale   SubCutaneous at bedtime    MEDICATIONS  (PRN):  acetaminophen   Tablet. 650 milliGRAM(s) Oral every 6 hours PRN Mild Pain (1 - 3)  oxyCODONE    5 mG/acetaminophen 325 mG 1 Tablet(s) Oral every 4 hours PRN Moderate Pain (4 - 6)  acetaminophen   Tablet 650 milliGRAM(s) Oral every 6 hours PRN For Temp greater than 38 C (100.4 F)  dextrose Gel 1 Dose(s) Oral once PRN Blood Glucose LESS THAN 70 milliGRAM(s)/deciliter  glucagon  Injectable 1 milliGRAM(s) IntraMuscular once PRN Glucose LESS THAN 70 milligrams/deciliter      Vital Signs Last 24 Hrs  T(C): 36.9 (24 Aug 2017 13:01), Max: 38.3 (23 Aug 2017 17:33)  T(F): 98.5 (24 Aug 2017 13:01), Max: 100.9 (23 Aug 2017 17:33)  HR: 95 (24 Aug 2017 13:01) (92 - 110)  BP: 96/59 (24 Aug 2017 13:01) (92/63 - 134/74)  BP(mean): --  RR: 18 (24 Aug 2017 13:01) (16 - 20)  SpO2: 95% (24 Aug 2017 13:01) (95% - 100%)  CAPILLARY BLOOD GLUCOSE  264 (24 Aug 2017 11:51)        PHYSICAL EXAM:  GENERAL: NAD, well-developed, obese   HEAD:  Atraumatic, Normocephalic  EYES: EOMI, conjunctiva and sclera clear  NECK: Supple, No JVD  CHEST/LUNG: Clear to auscultation bilaterally; No wheeze  HEART: Regular rate and rhythm; No murmurs  ABDOMEN: Soft, Nontender, Nondistended; Bowel sounds present  EXTREMITIES:  2+ Peripheral Pulses, No edema; L upper inner thigh w/ swelling/warmth and mild tenderness to palpation, area is indurated with no fluctuance at this time   PSYCH: AAOx3  NEUROLOGY: non-focal  SKIN: No rashes or lesions    LABS:                        12.6   18.96 )-----------( 182      ( 24 Aug 2017 05:51 )             38.5     08-24    134<L>  |  98  |  9   ----------------------------<  368<H>  3.9   |  21<L>  |  1.21    Ca    8.4      24 Aug 2017 05:51  Phos  3.6     08-24  Mg     1.5     08-24    TPro  7.1  /  Alb  3.1<L>  /  TBili  1.0  /  DBili  x   /  AST  11  /  ALT  7   /  AlkPhos  114  08-24    PT/INR - ( 23 Aug 2017 19:34 )   PT: 14.2 SEC;   INR: 1.26          PTT - ( 23 Aug 2017 19:34 )  PTT:27.6 SEC      Urinalysis Basic - ( 24 Aug 2017 05:30 )    Color: YELLOW / Appearance: CLEAR / S.042 / pH: 6.0  Gluc: >1000 / Ketone: MODERATE  / Bili: NEGATIVE / Urobili: NORMAL E.U.   Blood: NEGATIVE / Protein: 20 / Nitrite: NEGATIVE   Leuk Esterase: NEGATIVE / RBC: 0-2 / WBC 2-5   Sq Epi: RARE / Non Sq Epi: x / Bacteria: x

## 2017-08-24 NOTE — H&P ADULT - FAMILY HISTORY
No pertinent family history in first degree relatives Father  Still living? Unknown  Family history of diabetes mellitus, Age at diagnosis: Age Unknown  Family history of heart failure, Age at diagnosis: Age Unknown     Mother  Still living? Unknown  Family history of diabetes mellitus, Age at diagnosis: Age Unknown  Family history of hypertension, Age at diagnosis: Age Unknown     Sibling  Still living? Unknown  Family history of diabetes mellitus, Age at diagnosis: Age Unknown     Grandparent  Still living? Unknown  Family history of cerebrovascular accident (CVA), Age at diagnosis: Age Unknown  Family history of hypertension, Age at diagnosis: Age Unknown     Aunt  Still living? Unknown  Family history of breast cancer, Age at diagnosis: Age Unknown     Aunt  Still living? Unknown  Family history of breast cancer, Age at diagnosis: Age Unknown

## 2017-08-24 NOTE — H&P ADULT - PMH
Abscess    Diabetes mellitus    Hypercholesteremia Abscess    Diabetes mellitus  ~ diagnosed during week of 08/13 - 19/2017  Hypercholesteremia

## 2017-08-24 NOTE — H&P ADULT - NSHPSOCIALHISTORY_GEN_ALL_CORE
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

## 2017-08-24 NOTE — ED ADULT NURSE REASSESSMENT NOTE - NS ED NURSE REASSESS COMMENT FT1
report given to floor rn carlos. nad noted. vs as stated. resps even and unlabored. will ctm closely for remiander of stay in ed. medicated per orders.

## 2017-08-24 NOTE — CONSULT NOTE ADULT - SUBJECTIVE AND OBJECTIVE BOX
HPI:  47 year old male, with past history significant for Abscesses, DM, HLD, R-Knee arthroscopy, presented to the ED secondary to left pain and swelling of the left thigh.    Noted with HbA1c 15.3%. Reports being diagnosed with Type-2 Diabetes mellitus one week ago, but has not started on a medication since he was called since coming to the ED.  Noted increased thirst and polyuria for some time. + blurry vision.  Was started on an anti-lipid medication last week and has taken 3 tablets thus far.  Has had intentional weight loss of approximately 34 pounds since April 2017 (previously weight 336 pounds, and now weighs 302 pounds). He states he was told by his pcp that he needs to start diet and exercise but that he doesn't need to take insulin. His wife has DM on insulin. Patient reports fear of needles. He states he was seen by his doctor in April and had prediabetes at that time.      PAST MEDICAL & SURGICAL HISTORY:  Hypercholesteremia  Diabetes mellitus: ~ diagnosed during week of 08/13 - 19/2017  Abscess  H/O arthroscopy of right knee      FAMILY HISTORY:  Family history of heart failure (Father): father  Family history of breast cancer (Aunt, Aunt)  Family history of hypertension (Mother, Grandparent): grandmother, mother  Family history of cerebrovascular accident (CVA) (Grandparent): grandmother  Family history of diabetes mellitus (Father, Mother, Sibling): parents, sister      Social History: no tobacco    Outpatient Medications:  No DM meds    MEDICATIONS  (STANDING):  sodium chloride 0.9%. 1000 milliLiter(s) (100 mL/Hr) IV Continuous <Continuous>  piperacillin/tazobactam IVPB. 3.375 Gram(s) IV Intermittent every 8 hours  vancomycin  IVPB 1000 milliGRAM(s) IV Intermittent every 24 hours  dextrose 5%. 1000 milliLiter(s) (50 mL/Hr) IV Continuous <Continuous>  dextrose 50% Injectable 12.5 Gram(s) IV Push once  dextrose 50% Injectable 25 Gram(s) IV Push once  dextrose 50% Injectable 25 Gram(s) IV Push once  simvastatin 20 milliGRAM(s) Oral at bedtime  heparin  Injectable 5000 Unit(s) SubCutaneous every 12 hours  insulin glargine Injectable (LANTUS) 25 Unit(s) SubCutaneous at bedtime  insulin lispro Injectable (HumaLOG) 8 Unit(s) SubCutaneous three times a day before meals  insulin lispro (HumaLOG) corrective regimen sliding scale   SubCutaneous three times a day before meals  insulin lispro (HumaLOG) corrective regimen sliding scale   SubCutaneous at bedtime    MEDICATIONS  (PRN):  acetaminophen   Tablet. 650 milliGRAM(s) Oral every 6 hours PRN Mild Pain (1 - 3)  oxyCODONE    5 mG/acetaminophen 325 mG 1 Tablet(s) Oral every 4 hours PRN Moderate Pain (4 - 6)  acetaminophen   Tablet 650 milliGRAM(s) Oral every 6 hours PRN For Temp greater than 38 C (100.4 F)  dextrose Gel 1 Dose(s) Oral once PRN Blood Glucose LESS THAN 70 milliGRAM(s)/deciliter  glucagon  Injectable 1 milliGRAM(s) IntraMuscular once PRN Glucose LESS THAN 70 milligrams/deciliter      Allergies    No Known Allergies    Intolerances      Review of Systems:  Constitutional: No fever  Eyes: + blurry vision  Neuro: No tremors  HEENT: No pain  Cardiovascular: No chest pain, palpitations  Respiratory: No SOB, no cough  GI: No nausea, vomiting, abdominal pain  : No dysuria  Psych: no depression  Endocrine: +polyuria, polydipsia  Hem/lymph: no swelling  Osteoporosis: no fractures    ALL OTHER SYSTEMS REVIEWED AND NEGATIVE      PHYSICAL EXAM:  VITALS: T(C): 36.9 (08-24-17 @ 13:01)  T(F): 98.5 (08-24-17 @ 13:01), Max: 100.9 (08-23-17 @ 17:33)  HR: 95 (08-24-17 @ 13:01) (92 - 110)  BP: 96/59 (08-24-17 @ 13:01) (92/63 - 134/74)  RR:  (16 - 20)  SpO2:  (95% - 100%)  Wt(kg): --  GENERAL: NAD, well-groomed, well-developed  EYES: No proptosis, no lid lag, anicteric  HEENT:  Atraumatic, Normocephalic, moist mucous membranes  THYROID: Normal size, no palpable nodules  RESPIRATORY: Clear to auscultation bilaterally; No rales, rhonchi, wheezing  CARDIOVASCULAR: Regular rate and rhythm; No murmurs; no peripheral edema  GI: Soft, nontender, non distended, normal bowel sounds  SKIN: Dry, intact, No rashes   MUSCULOSKELETAL: Full range of motion, normal strength  NEURO: sensation intact, extraocular movements intact, no tremor  PSYCH: Alert and oriented x 3, normal affect, normal mood  CUSHING'S SIGNS: no striae    CAPILLARY BLOOD GLUCOSE  264 (08-24 @ 11:51)                            12.6   18.96 )-----------( 182      ( 24 Aug 2017 05:51 )             38.5       08-24    134<L>  |  98  |  9   ----------------------------<  368<H>  3.9   |  21<L>  |  1.21    EGFR if : 81  EGFR if non : 70    Ca    8.4      08-24  Mg     1.5     08-24  Phos  3.6     08-24    TPro  7.1  /  Alb  3.1<L>  /  TBili  1.0  /  DBili  x   /  AST  11  /  ALT  7   /  AlkPhos  114  08-24      Thyroid Function Tests:  08-24 @ 05:51 TSH 2.00 FreeT4 -- T3 -- Anti TPO -- Anti Thyroglobulin Ab -- TSI --      Hemoglobin A1C, Whole Blood: 15.3 % <H> [4.0 - 5.6] (08-24-17 @ 05:51)      08-24 Chol 167  HDL 8<L> Trig 206<H>    Radiology:

## 2017-08-24 NOTE — DISCHARGE NOTE ADULT - CARE PROVIDER_API CALL
PCP,   Dr Sal  Phone: (   )    -  Fax: (   )    - PCP,   Dr Sal  Phone: (   )    -  Fax: (   )    -    Veronica Adams), DieticianNutrition; Surgery; Surgical Critical Care  [address] [facility]  [address], [place] [address]  Phone: [phone]  Fax: [phone]

## 2017-08-25 LAB
BUN SERPL-MCNC: 9 MG/DL — SIGNIFICANT CHANGE UP (ref 7–23)
CALCIUM SERPL-MCNC: 8.1 MG/DL — LOW (ref 8.4–10.5)
CHLORIDE SERPL-SCNC: 100 MMOL/L — SIGNIFICANT CHANGE UP (ref 98–107)
CO2 SERPL-SCNC: 20 MMOL/L — LOW (ref 22–31)
CREAT SERPL-MCNC: 1.04 MG/DL — SIGNIFICANT CHANGE UP (ref 0.5–1.3)
GLUCOSE SERPL-MCNC: 238 MG/DL — HIGH (ref 70–99)
GRAM STN WND: SIGNIFICANT CHANGE UP
GRAM STN WND: SIGNIFICANT CHANGE UP
HCT VFR BLD CALC: 38.5 % — LOW (ref 39–50)
HGB BLD-MCNC: 12.5 G/DL — LOW (ref 13–17)
MCHC RBC-ENTMCNC: 28.6 PG — SIGNIFICANT CHANGE UP (ref 27–34)
MCHC RBC-ENTMCNC: 32.5 % — SIGNIFICANT CHANGE UP (ref 32–36)
MCV RBC AUTO: 88.1 FL — SIGNIFICANT CHANGE UP (ref 80–100)
NRBC # FLD: 0 — SIGNIFICANT CHANGE UP
PLATELET # BLD AUTO: 174 K/UL — SIGNIFICANT CHANGE UP (ref 150–400)
PMV BLD: 12.1 FL — SIGNIFICANT CHANGE UP (ref 7–13)
POTASSIUM SERPL-MCNC: 3.6 MMOL/L — SIGNIFICANT CHANGE UP (ref 3.5–5.3)
POTASSIUM SERPL-SCNC: 3.6 MMOL/L — SIGNIFICANT CHANGE UP (ref 3.5–5.3)
RBC # BLD: 4.37 M/UL — SIGNIFICANT CHANGE UP (ref 4.2–5.8)
RBC # FLD: 12.4 % — SIGNIFICANT CHANGE UP (ref 10.3–14.5)
SODIUM SERPL-SCNC: 136 MMOL/L — SIGNIFICANT CHANGE UP (ref 135–145)
SPECIMEN SOURCE: SIGNIFICANT CHANGE UP
SPECIMEN SOURCE: SIGNIFICANT CHANGE UP
VANCOMYCIN TROUGH SERPL-MCNC: 2.2 UG/ML — LOW (ref 10–20)
WBC # BLD: 14.33 K/UL — HIGH (ref 3.8–10.5)
WBC # FLD AUTO: 14.33 K/UL — HIGH (ref 3.8–10.5)

## 2017-08-25 PROCEDURE — 99253 IP/OBS CNSLTJ NEW/EST LOW 45: CPT | Mod: 57,25,GC

## 2017-08-25 PROCEDURE — 76882 US LMTD JT/FCL EVL NVASC XTR: CPT | Mod: 26,LT

## 2017-08-25 PROCEDURE — 99233 SBSQ HOSP IP/OBS HIGH 50: CPT

## 2017-08-25 PROCEDURE — 72193 CT PELVIS W/DYE: CPT | Mod: 26

## 2017-08-25 PROCEDURE — 99232 SBSQ HOSP IP/OBS MODERATE 35: CPT

## 2017-08-25 PROCEDURE — 10061 I&D ABSCESS COMP/MULTIPLE: CPT | Mod: GC

## 2017-08-25 RX ORDER — HYDROMORPHONE HYDROCHLORIDE 2 MG/ML
0.5 INJECTION INTRAMUSCULAR; INTRAVENOUS; SUBCUTANEOUS
Qty: 0 | Refills: 0 | Status: DISCONTINUED | OUTPATIENT
Start: 2017-08-25 | End: 2017-08-29

## 2017-08-25 RX ORDER — ONDANSETRON 8 MG/1
4 TABLET, FILM COATED ORAL ONCE
Qty: 0 | Refills: 0 | Status: DISCONTINUED | OUTPATIENT
Start: 2017-08-25 | End: 2017-08-29

## 2017-08-25 RX ORDER — INSULIN GLARGINE 100 [IU]/ML
28 INJECTION, SOLUTION SUBCUTANEOUS AT BEDTIME
Qty: 0 | Refills: 0 | Status: DISCONTINUED | OUTPATIENT
Start: 2017-08-25 | End: 2017-08-26

## 2017-08-25 RX ORDER — INSULIN LISPRO 100/ML
VIAL (ML) SUBCUTANEOUS
Qty: 0 | Refills: 0 | Status: DISCONTINUED | OUTPATIENT
Start: 2017-08-25 | End: 2017-08-29

## 2017-08-25 RX ORDER — INSULIN LISPRO 100/ML
VIAL (ML) SUBCUTANEOUS AT BEDTIME
Qty: 0 | Refills: 0 | Status: DISCONTINUED | OUTPATIENT
Start: 2017-08-25 | End: 2017-08-29

## 2017-08-25 RX ORDER — HEPARIN SODIUM 5000 [USP'U]/ML
7500 INJECTION INTRAVENOUS; SUBCUTANEOUS EVERY 8 HOURS
Qty: 0 | Refills: 0 | Status: DISCONTINUED | OUTPATIENT
Start: 2017-08-25 | End: 2017-08-29

## 2017-08-25 RX ORDER — INSULIN LISPRO 100/ML
9 VIAL (ML) SUBCUTANEOUS
Qty: 0 | Refills: 0 | Status: DISCONTINUED | OUTPATIENT
Start: 2017-08-25 | End: 2017-08-26

## 2017-08-25 RX ORDER — VANCOMYCIN HCL 1 G
1000 VIAL (EA) INTRAVENOUS EVERY 12 HOURS
Qty: 0 | Refills: 0 | Status: DISCONTINUED | OUTPATIENT
Start: 2017-08-25 | End: 2017-08-27

## 2017-08-25 RX ORDER — MORPHINE SULFATE 50 MG/1
2 CAPSULE, EXTENDED RELEASE ORAL EVERY 4 HOURS
Qty: 0 | Refills: 0 | Status: DISCONTINUED | OUTPATIENT
Start: 2017-08-25 | End: 2017-08-29

## 2017-08-25 RX ORDER — SODIUM CHLORIDE 9 MG/ML
1000 INJECTION INTRAMUSCULAR; INTRAVENOUS; SUBCUTANEOUS
Qty: 0 | Refills: 0 | Status: DISCONTINUED | OUTPATIENT
Start: 2017-08-25 | End: 2017-08-28

## 2017-08-25 RX ADMIN — HYDROMORPHONE HYDROCHLORIDE 0.5 MILLIGRAM(S): 2 INJECTION INTRAMUSCULAR; INTRAVENOUS; SUBCUTANEOUS at 20:00

## 2017-08-25 RX ADMIN — Medication 1: at 08:58

## 2017-08-25 RX ADMIN — Medication 250 MILLIGRAM(S): at 21:59

## 2017-08-25 RX ADMIN — HEPARIN SODIUM 7500 UNIT(S): 5000 INJECTION INTRAVENOUS; SUBCUTANEOUS at 21:15

## 2017-08-25 RX ADMIN — Medication 2: at 12:22

## 2017-08-25 RX ADMIN — Medication 8 UNIT(S): at 08:59

## 2017-08-25 RX ADMIN — PIPERACILLIN AND TAZOBACTAM 25 GRAM(S): 4; .5 INJECTION, POWDER, LYOPHILIZED, FOR SOLUTION INTRAVENOUS at 05:00

## 2017-08-25 RX ADMIN — HEPARIN SODIUM 5000 UNIT(S): 5000 INJECTION INTRAVENOUS; SUBCUTANEOUS at 05:00

## 2017-08-25 RX ADMIN — HYDROMORPHONE HYDROCHLORIDE 0.5 MILLIGRAM(S): 2 INJECTION INTRAMUSCULAR; INTRAVENOUS; SUBCUTANEOUS at 19:45

## 2017-08-25 RX ADMIN — PIPERACILLIN AND TAZOBACTAM 25 GRAM(S): 4; .5 INJECTION, POWDER, LYOPHILIZED, FOR SOLUTION INTRAVENOUS at 12:22

## 2017-08-25 RX ADMIN — INSULIN GLARGINE 28 UNIT(S): 100 INJECTION, SOLUTION SUBCUTANEOUS at 21:15

## 2017-08-25 RX ADMIN — SIMVASTATIN 20 MILLIGRAM(S): 20 TABLET, FILM COATED ORAL at 21:16

## 2017-08-25 RX ADMIN — SODIUM CHLORIDE 75 MILLILITER(S): 9 INJECTION INTRAMUSCULAR; INTRAVENOUS; SUBCUTANEOUS at 20:15

## 2017-08-25 NOTE — PROGRESS NOTE ADULT - PROBLEM SELECTOR PLAN 2
- continue with basal bolus regimen in hospital  - DM education and nutrition consult   - pt adamantly refusing to be on insulin at home  - f/u endo recs - will likely adjust insulin today

## 2017-08-25 NOTE — PROGRESS NOTE ADULT - SUBJECTIVE AND OBJECTIVE BOX
Chief Complaint: DM2    History: found with necrotizing fasciitis for OR today. Made NPO.  No hypoglycemia.    MEDICATIONS  (STANDING):  sodium chloride 0.9%. 1000 milliLiter(s) (100 mL/Hr) IV Continuous <Continuous>  piperacillin/tazobactam IVPB. 3.375 Gram(s) IV Intermittent every 8 hours  vancomycin  IVPB 1000 milliGRAM(s) IV Intermittent every 24 hours  dextrose 5%. 1000 milliLiter(s) (50 mL/Hr) IV Continuous <Continuous>  dextrose 50% Injectable 12.5 Gram(s) IV Push once  dextrose 50% Injectable 25 Gram(s) IV Push once  dextrose 50% Injectable 25 Gram(s) IV Push once  simvastatin 20 milliGRAM(s) Oral at bedtime  heparin  Injectable 5000 Unit(s) SubCutaneous every 12 hours  insulin glargine Injectable (LANTUS) 25 Unit(s) SubCutaneous at bedtime  insulin lispro Injectable (HumaLOG) 8 Unit(s) SubCutaneous three times a day before meals  insulin lispro (HumaLOG) corrective regimen sliding scale   SubCutaneous three times a day before meals  insulin lispro (HumaLOG) corrective regimen sliding scale   SubCutaneous at bedtime    MEDICATIONS  (PRN):  acetaminophen   Tablet. 650 milliGRAM(s) Oral every 6 hours PRN Mild Pain (1 - 3)  oxyCODONE    5 mG/acetaminophen 325 mG 1 Tablet(s) Oral every 4 hours PRN Moderate Pain (4 - 6)  acetaminophen   Tablet 650 milliGRAM(s) Oral every 6 hours PRN For Temp greater than 38 C (100.4 F)  dextrose Gel 1 Dose(s) Oral once PRN Blood Glucose LESS THAN 70 milliGRAM(s)/deciliter  glucagon  Injectable 1 milliGRAM(s) IntraMuscular once PRN Glucose LESS THAN 70 milligrams/deciliter  morphine  - Injectable 2 milliGRAM(s) IV Push every 4 hours PRN Severe Pain (7 - 10)      Allergies    No Known Allergies    Intolerances    pork (Other)    Review of Systems:    ALL OTHER SYSTEMS REVIEWED AND NEGATIVE      PHYSICAL EXAM:  VITALS: T(C): 37.4 (08-25-17 @ 16:35)  T(F): 99.3 (08-25-17 @ 16:35), Max: 99.3 (08-25-17 @ 16:35)  HR: 91 (08-25-17 @ 16:35) (87 - 97)  BP: 150/83 (08-25-17 @ 16:35) (98/62 - 150/83)  RR:  (16 - 18)  SpO2:  (95% - 98%)  Wt(kg): --  GENERAL: NAD, well-groomed, well-developed  EYES: No proptosis, no lid lag, anicteric  HEENT:  Atraumatic, Normocephalic, moist mucous membranes  GI: Soft, nontender, non distended, obese  PSYCH: Alert and oriented x 3, normal affect, normal mood    CAPILLARY BLOOD GLUCOSE  201 (08-25 @ 16:35)  242 (08-25 @ 12:21)  198 (08-25 @ 08:36)  259 (08-24 @ 22:03)  249 (08-24 @ 16:16)  264 (08-24 @ 11:51)      08-25    136  |  100  |  9   ----------------------------<  238<H>  3.6   |  20<L>  |  1.04    EGFR if : 97  EGFR if non : 84    Ca    8.1<L>      08-25  Mg     1.5     08-24  Phos  3.6     08-24    TPro  7.1  /  Alb  3.1<L>  /  TBili  1.0  /  DBili  x   /  AST  11  /  ALT  7   /  AlkPhos  114  08-24          Thyroid Function Tests:  08-24 @ 05:51 TSH 2.00 FreeT4 -- T3 -- Anti TPO -- Anti Thyroglobulin Ab -- TSI --      Hemoglobin A1C, Whole Blood: 15.3 % <H> [4.0 - 5.6] (08-24-17 @ 05:51)

## 2017-08-25 NOTE — CONSULT NOTE ADULT - SUBJECTIVE AND OBJECTIVE BOX
48yo M with recently diagnosed DM (A1C 15%), presented to the hospital on Wednesday with pain and swelling of left thigh, fevers, and chills. He first noticed a small bump on his upper left thigh on Monday. 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 Wednesday he presented to the hospital. CT did not show a drainable collection, just cellulitis, started on antibiotics and pain control. Over the past two days, the lesion grew and is now much larger and much more painful. Ultrasound today again does not show a drainable collection but does include a concern for necrotizing fasciitis. Follow-up CT is pending.    PMH  Hypercholesteremia  Diabetes mellitus    PSH  H/O arthroscopy of right knee    Allergies  No Known Allergies    Physical Exam  T(C): 36.8 (17 @ 13:10), Max: 36.9 (17 @ 22:28)  HR: 97 (17 @ 13:10) (87 - 97)  BP: 130/77 (17 @ 13:10) (98/62 - 130/77)  RR: 18 (17 @ 13:10) (17 - 18)  SpO2: 97% (17 @ 13:10) (95% - 98%)  Wt(kg): --  Tmax: T(C): , Max: 36.9 (17 @ 22:28)  Wt(kg): --    Gen: NAD  Abd: Obese, soft, ND, NTP, no rebound, no guarding, no palpable organomegaly/masses  Groin: L upper thigh soft tissue swelling, tender to palpation. Some induration superior to inguinal ligament that is also tender to palpation. Pinhole opening near perineal fold draining seropurulent fluid. No crepitus appreciated.  Ext: warm, no edema      Labs:                        12.5   14.33 )-----------( 174      ( 25 Aug 2017 05:41 )             38.5         136  |  100  |  9   ----------------------------<  238<H>  3.6   |  20<L>  |  1.04    Ca    8.1<L>      25 Aug 2017 04:00  Phos  3.6     -  Mg     1.5     -    TPro  7.1  /  Alb  3.1<L>  /  TBili  1.0  /  DBili  x   /  AST  11  /  ALT  7   /  AlkPhos  114      PT/INR - ( 23 Aug 2017 19:34 )   PT: 14.2 SEC;   INR: 1.26          PTT - ( 23 Aug 2017 19:34 )  PTT:27.6 SEC  Urinalysis Basic - ( 24 Aug 2017 05:30 )    Color: YELLOW / Appearance: CLEAR / S.042 / pH: 6.0  Gluc: >1000 / Ketone: MODERATE  / Bili: NEGATIVE / Urobili: NORMAL E.U.   Blood: NEGATIVE / Protein: 20 / Nitrite: NEGATIVE   Leuk Esterase: NEGATIVE / RBC: 0-2 / WBC 2-5   Sq Epi: RARE / Non Sq Epi: x / Bacteria: x    US Extremity Nonvasc Limited, Left (17 @ 10:44)  Findings: Survey of the region of concern in the groin/upper thigh   revealed no fluid collection. Generalized edema was noted. There was a   small collection of gas raising the possibility of a necrotizing   fasciitis. Repeat CT scan of the groin and upper thigh is suggested to   determine the full extent.    Impression:    No abscess identified.  Small pocket of gas in the upper thighs suggesting necrotizing fasciitis.            Imaging

## 2017-08-25 NOTE — PROGRESS NOTE ADULT - ASSESSMENT
47M w/ hx of groin abscess (~ 2-3 times a year), recently diagnosed DM2 presenting with L inner thigh cellulitis/abscess

## 2017-08-25 NOTE — PROGRESS NOTE ADULT - SUBJECTIVE AND OBJECTIVE BOX
Patient is a 47y old  Male who presents with a chief complaint of Pain and swelling of the left thigh, as well as fevers, chills and sweating (24 Aug 2017 12:19)      SUBJECTIVE / OVERNIGHT EVENTS:  Pt with pain in L inguinal fold - small amount of pus/serosanguinous discharge- denies fevers/chills.     MEDICATIONS  (STANDING):  sodium chloride 0.9%. 1000 milliLiter(s) (100 mL/Hr) IV Continuous <Continuous>  piperacillin/tazobactam IVPB. 3.375 Gram(s) IV Intermittent every 8 hours  vancomycin  IVPB 1000 milliGRAM(s) IV Intermittent every 24 hours  dextrose 5%. 1000 milliLiter(s) (50 mL/Hr) IV Continuous <Continuous>  dextrose 50% Injectable 12.5 Gram(s) IV Push once  dextrose 50% Injectable 25 Gram(s) IV Push once  dextrose 50% Injectable 25 Gram(s) IV Push once  simvastatin 20 milliGRAM(s) Oral at bedtime  heparin  Injectable 5000 Unit(s) SubCutaneous every 12 hours  insulin glargine Injectable (LANTUS) 25 Unit(s) SubCutaneous at bedtime  insulin lispro Injectable (HumaLOG) 8 Unit(s) SubCutaneous three times a day before meals  insulin lispro (HumaLOG) corrective regimen sliding scale   SubCutaneous three times a day before meals  insulin lispro (HumaLOG) corrective regimen sliding scale   SubCutaneous at bedtime    MEDICATIONS  (PRN):  acetaminophen   Tablet. 650 milliGRAM(s) Oral every 6 hours PRN Mild Pain (1 - 3)  oxyCODONE    5 mG/acetaminophen 325 mG 1 Tablet(s) Oral every 4 hours PRN Moderate Pain (4 - 6)  acetaminophen   Tablet 650 milliGRAM(s) Oral every 6 hours PRN For Temp greater than 38 C (100.4 F)  dextrose Gel 1 Dose(s) Oral once PRN Blood Glucose LESS THAN 70 milliGRAM(s)/deciliter  glucagon  Injectable 1 milliGRAM(s) IntraMuscular once PRN Glucose LESS THAN 70 milligrams/deciliter      Vital Signs Last 24 Hrs  T(C): 36.9 (25 Aug 2017 04:49), Max: 36.9 (24 Aug 2017 13:01)  T(F): 98.4 (25 Aug 2017 04:49), Max: 98.5 (24 Aug 2017 13:01)  HR: 87 (25 Aug 2017 04:49) (87 - 95)  BP: 121/79 (25 Aug 2017 04:49) (96/59 - 121/79)  BP(mean): --  RR: 17 (25 Aug 2017 04:49) (17 - 18)  SpO2: 98% (25 Aug 2017 04:49) (95% - 98%)  CAPILLARY BLOOD GLUCOSE  198 (25 Aug 2017 08:36)  259 (24 Aug 2017 22:03)  249 (24 Aug 2017 16:16)  264 (24 Aug 2017 11:51)        I&O's Summary      PHYSICAL EXAM:  GENERAL: NAD, well-developed, obese   HEAD:  Atraumatic, Normocephalic  EYES: EOMI, conjunctiva and sclera clear  NECK: Supple, No JVD  CHEST/LUNG: Clear to auscultation bilaterally; No wheeze  HEART: Regular rate and rhythm; No murmurs  ABDOMEN: Soft, Nontender, Nondistended; Bowel sounds present  EXTREMITIES:  2+ Peripheral Pulses, No edema; L upper inner thigh w/ swelling/warmth and mild tenderness to palpation - inguinal fold with pus/serosanguinous drainage    PSYCH: AAOx3  NEUROLOGY: non-focal  SKIN: as above       LABS:                        12.5   14.33 )-----------( 174      ( 25 Aug 2017 05:41 )             38.5     08-25    136  |  100  |  9   ----------------------------<  238<H>  3.6   |  20<L>  |  1.04    Ca    8.1<L>      25 Aug 2017 04:00  Phos  3.6     08-24  Mg     1.5     08-24    TPro  7.1  /  Alb  3.1<L>  /  TBili  1.0  /  DBili  x   /  AST  11  /  ALT  7   /  AlkPhos  114  08-24    PT/INR - ( 23 Aug 2017 19:34 )   PT: 14.2 SEC;   INR: 1.26          PTT - ( 23 Aug 2017 19:34 )  PTT:27.6 SEC      Urinalysis Basic - ( 24 Aug 2017 05:30 )    Color: YELLOW / Appearance: CLEAR / S.042 / pH: 6.0  Gluc: >1000 / Ketone: MODERATE  / Bili: NEGATIVE / Urobili: NORMAL E.U.   Blood: NEGATIVE / Protein: 20 / Nitrite: NEGATIVE   Leuk Esterase: NEGATIVE / RBC: 0-2 / WBC 2-5   Sq Epi: RARE / Non Sq Epi: x / Bacteria: x

## 2017-08-25 NOTE — PROGRESS NOTE ADULT - SUBJECTIVE AND OBJECTIVE BOX
[facility] GENERAL SURGERY POST-OP NOTE    SUBJECTIVE: Pt underwent L groin debridement for questionable necrotizing fasciitis. Pt tolerated the procedure well and had 2 penrose drains placed. Pt transferred to PACU in stable condition. Pt seen and evaluated at bedside. Resting comfortably in bed. Pain controlled. Denies nausea/vomiting, CP, palpitations, SOB, lightheaded, dizziness. Voiding. Ambulating. Tolerating PO intake.     Objective:  Gen: NAD, AAOx3  Pulm: b/l chest rise. No work on breathing  Card: RRR  Abd: soft, appropriately tender, ND. Dressings CDI. penrose in place in L groin    Vital Signs Last 24 Hrs  T(C): 36.7 (25 Aug 2017 21:00), Max: 37.4 (25 Aug 2017 16:35)  T(F): 98.1 (25 Aug 2017 21:00), Max: 99.3 (25 Aug 2017 16:35)  HR: 81 (25 Aug 2017 22:15) (78 - 97)  BP: 101/63 (25 Aug 2017 22:15) (101/54 - 150/83)  BP(mean): --  RR: 15 (25 Aug 2017 22:15) (14 - 27)  SpO2: 95% (25 Aug 2017 22:15) (94% - 100%)  I&O's Summary    25 Aug 2017 07:  -  25 Aug 2017 22:33  --------------------------------------------------------  IN: 585 mL / OUT: 350 mL / NET: 235 mL      I&O's Detail    25 Aug 2017 07:  -  25 Aug 2017 22:33  --------------------------------------------------------  IN:    Oral Fluid: 360 mL    sodium chloride 0.9%.: 225 mL  Total IN: 585 mL    OUT:    Voided: 350 mL  Total OUT: 350 mL    Total NET: 235 mL          MEDICATIONS  (STANDING):  piperacillin/tazobactam IVPB. 3.375 Gram(s) IV Intermittent every 8 hours  simvastatin 20 milliGRAM(s) Oral at bedtime  insulin glargine Injectable (LANTUS) 28 Unit(s) SubCutaneous at bedtime  insulin lispro Injectable (HumaLOG) 9 Unit(s) SubCutaneous three times a day before meals  insulin lispro (HumaLOG) corrective regimen sliding scale   SubCutaneous three times a day before meals  insulin lispro (HumaLOG) corrective regimen sliding scale   SubCutaneous at bedtime  heparin  Injectable 7500 Unit(s) SubCutaneous every 8 hours  sodium chloride 0.9%. 1000 milliLiter(s) (75 mL/Hr) IV Continuous <Continuous>  vancomycin  IVPB 1000 milliGRAM(s) IV Intermittent every 12 hours    MEDICATIONS  (PRN):  acetaminophen   Tablet. 650 milliGRAM(s) Oral every 6 hours PRN Mild Pain (1 - 3)  oxyCODONE    5 mG/acetaminophen 325 mG 1 Tablet(s) Oral every 4 hours PRN Moderate Pain (4 - 6)  acetaminophen   Tablet 650 milliGRAM(s) Oral every 6 hours PRN For Temp greater than 38 C (100.4 F)  morphine  - Injectable 2 milliGRAM(s) IV Push every 4 hours PRN Severe Pain (7 - 10)  HYDROmorphone  Injectable 0.5 milliGRAM(s) IV Push every 10 minutes PRN Severe Pain (7 - 10)  ondansetron Injectable 4 milliGRAM(s) IV Push once PRN Nausea and/or Vomiting      LABS:                        12.5   14.33 )-----------( 174      ( 25 Aug 2017 05:41 )             38.5     08-    136  |  100  |  9   ----------------------------<  238<H>  3.6   |  20<L>  |  1.04    Ca    8.1<L>      25 Aug 2017 04:00  Phos  3.6     08-24  Mg     1.5     08-24    TPro  7.1  /  Alb  3.1<L>  /  TBili  1.0  /  DBili  x   /  AST  11  /  ALT  7   /  AlkPhos  114  08-24      Urinalysis Basic - ( 24 Aug 2017 05:30 )    Color: YELLOW / Appearance: CLEAR / S.042 / pH: 6.0  Gluc: >1000 / Ketone: MODERATE  / Bili: NEGATIVE / Urobili: NORMAL E.U.   Blood: NEGATIVE / Protein: 20 / Nitrite: NEGATIVE   Leuk Esterase: NEGATIVE / RBC: 0-2 / WBC 2-5   Sq Epi: RARE / Non Sq Epi: x / Bacteria: x        RADIOLOGY & ADDITIONAL STUDIES:      A/P: 47y Male s/p I&D of L groin wound with concern for soft tissue infection. Pt doing well and hemodynamically stable.     - continue penrose drain  - Pain control  - Strict I/O's  - continue zosyn, vanc  - f/u cultures  - OOB/DVT ppx  - AM labs  - dispo: to transfer to floor

## 2017-08-26 DIAGNOSIS — L08.9 LOCAL INFECTION OF THE SKIN AND SUBCUTANEOUS TISSUE, UNSPECIFIED: ICD-10-CM

## 2017-08-26 LAB
BUN SERPL-MCNC: 12 MG/DL — SIGNIFICANT CHANGE UP (ref 7–23)
CALCIUM SERPL-MCNC: 8.5 MG/DL — SIGNIFICANT CHANGE UP (ref 8.4–10.5)
CHLORIDE SERPL-SCNC: 101 MMOL/L — SIGNIFICANT CHANGE UP (ref 98–107)
CO2 SERPL-SCNC: 22 MMOL/L — SIGNIFICANT CHANGE UP (ref 22–31)
CREAT SERPL-MCNC: 1.16 MG/DL — SIGNIFICANT CHANGE UP (ref 0.5–1.3)
GLUCOSE SERPL-MCNC: 265 MG/DL — HIGH (ref 70–99)
HCT VFR BLD CALC: 40.3 % — SIGNIFICANT CHANGE UP (ref 39–50)
HGB BLD-MCNC: 12.9 G/DL — LOW (ref 13–17)
MCHC RBC-ENTMCNC: 28.5 PG — SIGNIFICANT CHANGE UP (ref 27–34)
MCHC RBC-ENTMCNC: 32 % — SIGNIFICANT CHANGE UP (ref 32–36)
MCV RBC AUTO: 89.2 FL — SIGNIFICANT CHANGE UP (ref 80–100)
NRBC # FLD: 0 — SIGNIFICANT CHANGE UP
PLATELET # BLD AUTO: 190 K/UL — SIGNIFICANT CHANGE UP (ref 150–400)
PMV BLD: 11.8 FL — SIGNIFICANT CHANGE UP (ref 7–13)
POTASSIUM SERPL-MCNC: 3.4 MMOL/L — LOW (ref 3.5–5.3)
POTASSIUM SERPL-SCNC: 3.4 MMOL/L — LOW (ref 3.5–5.3)
RBC # BLD: 4.52 M/UL — SIGNIFICANT CHANGE UP (ref 4.2–5.8)
RBC # FLD: 12.8 % — SIGNIFICANT CHANGE UP (ref 10.3–14.5)
SODIUM SERPL-SCNC: 138 MMOL/L — SIGNIFICANT CHANGE UP (ref 135–145)
WBC # BLD: 12.14 K/UL — HIGH (ref 3.8–10.5)
WBC # FLD AUTO: 12.14 K/UL — HIGH (ref 3.8–10.5)

## 2017-08-26 PROCEDURE — 99233 SBSQ HOSP IP/OBS HIGH 50: CPT

## 2017-08-26 RX ORDER — POTASSIUM CHLORIDE 20 MEQ
20 PACKET (EA) ORAL ONCE
Qty: 0 | Refills: 0 | Status: COMPLETED | OUTPATIENT
Start: 2017-08-26 | End: 2017-08-26

## 2017-08-26 RX ORDER — INSULIN GLARGINE 100 [IU]/ML
33 INJECTION, SOLUTION SUBCUTANEOUS AT BEDTIME
Qty: 0 | Refills: 0 | Status: DISCONTINUED | OUTPATIENT
Start: 2017-08-26 | End: 2017-08-29

## 2017-08-26 RX ORDER — INSULIN LISPRO 100/ML
11 VIAL (ML) SUBCUTANEOUS
Qty: 0 | Refills: 0 | Status: DISCONTINUED | OUTPATIENT
Start: 2017-08-26 | End: 2017-08-29

## 2017-08-26 RX ADMIN — Medication 4: at 22:46

## 2017-08-26 RX ADMIN — PIPERACILLIN AND TAZOBACTAM 25 GRAM(S): 4; .5 INJECTION, POWDER, LYOPHILIZED, FOR SOLUTION INTRAVENOUS at 17:38

## 2017-08-26 RX ADMIN — Medication 9 UNIT(S): at 12:58

## 2017-08-26 RX ADMIN — HEPARIN SODIUM 7500 UNIT(S): 5000 INJECTION INTRAVENOUS; SUBCUTANEOUS at 13:05

## 2017-08-26 RX ADMIN — OXYCODONE AND ACETAMINOPHEN 1 TABLET(S): 5; 325 TABLET ORAL at 20:25

## 2017-08-26 RX ADMIN — HEPARIN SODIUM 7500 UNIT(S): 5000 INJECTION INTRAVENOUS; SUBCUTANEOUS at 21:30

## 2017-08-26 RX ADMIN — SODIUM CHLORIDE 75 MILLILITER(S): 9 INJECTION INTRAMUSCULAR; INTRAVENOUS; SUBCUTANEOUS at 13:06

## 2017-08-26 RX ADMIN — OXYCODONE AND ACETAMINOPHEN 1 TABLET(S): 5; 325 TABLET ORAL at 13:35

## 2017-08-26 RX ADMIN — Medication 6: at 12:58

## 2017-08-26 RX ADMIN — SIMVASTATIN 20 MILLIGRAM(S): 20 TABLET, FILM COATED ORAL at 21:29

## 2017-08-26 RX ADMIN — OXYCODONE AND ACETAMINOPHEN 1 TABLET(S): 5; 325 TABLET ORAL at 13:05

## 2017-08-26 RX ADMIN — INSULIN GLARGINE 33 UNIT(S): 100 INJECTION, SOLUTION SUBCUTANEOUS at 22:46

## 2017-08-26 RX ADMIN — Medication 250 MILLIGRAM(S): at 21:30

## 2017-08-26 RX ADMIN — HEPARIN SODIUM 7500 UNIT(S): 5000 INJECTION INTRAVENOUS; SUBCUTANEOUS at 05:25

## 2017-08-26 RX ADMIN — Medication 11 UNIT(S): at 17:18

## 2017-08-26 RX ADMIN — PIPERACILLIN AND TAZOBACTAM 25 GRAM(S): 4; .5 INJECTION, POWDER, LYOPHILIZED, FOR SOLUTION INTRAVENOUS at 02:06

## 2017-08-26 RX ADMIN — Medication 9 UNIT(S): at 09:04

## 2017-08-26 RX ADMIN — Medication 4: at 09:04

## 2017-08-26 RX ADMIN — Medication 20 MILLIEQUIVALENT(S): at 09:03

## 2017-08-26 RX ADMIN — Medication 250 MILLIGRAM(S): at 09:03

## 2017-08-26 RX ADMIN — PIPERACILLIN AND TAZOBACTAM 25 GRAM(S): 4; .5 INJECTION, POWDER, LYOPHILIZED, FOR SOLUTION INTRAVENOUS at 11:00

## 2017-08-26 RX ADMIN — Medication 2: at 17:18

## 2017-08-26 RX ADMIN — OXYCODONE AND ACETAMINOPHEN 1 TABLET(S): 5; 325 TABLET ORAL at 21:25

## 2017-08-26 NOTE — DIETITIAN INITIAL EVALUATION ADULT. - PROBLEM SELECTOR PLAN 4
- most likely due to Type-2 DM with elevated blood glucose levels  - but patient also with mild pain on micturition  - ?mild CVA tenderness on right  - urinalysis ordered (already on antibiotic, in the event of infection positivity)

## 2017-08-26 NOTE — DIETITIAN INITIAL EVALUATION ADULT. - NUTRITION INTERVENTION
Nutrition Education/Meals and Snack Nutrition Education/Collaboration and Referral of Nutrition Care

## 2017-08-26 NOTE — PROGRESS NOTE ADULT - SUBJECTIVE AND OBJECTIVE BOX
Chief Complaint: Hyperglycemia    History:  Patient eating well, denies any complaints.    MEDICATIONS  (STANDING):  piperacillin/tazobactam IVPB. 3.375 Gram(s) IV Intermittent every 8 hours  simvastatin 20 milliGRAM(s) Oral at bedtime  insulin lispro (HumaLOG) corrective regimen sliding scale   SubCutaneous three times a day before meals  insulin lispro (HumaLOG) corrective regimen sliding scale   SubCutaneous at bedtime  heparin  Injectable 7500 Unit(s) SubCutaneous every 8 hours  sodium chloride 0.9%. 1000 milliLiter(s) (75 mL/Hr) IV Continuous <Continuous>  vancomycin  IVPB 1000 milliGRAM(s) IV Intermittent every 12 hours  insulin lispro Injectable (HumaLOG) 11 Unit(s) SubCutaneous three times a day before meals  insulin glargine Injectable (LANTUS) 33 Unit(s) SubCutaneous at bedtime    MEDICATIONS  (PRN):  acetaminophen   Tablet. 650 milliGRAM(s) Oral every 6 hours PRN Mild Pain (1 - 3)  oxyCODONE    5 mG/acetaminophen 325 mG 1 Tablet(s) Oral every 4 hours PRN Moderate Pain (4 - 6)  acetaminophen   Tablet 650 milliGRAM(s) Oral every 6 hours PRN For Temp greater than 38 C (100.4 F)  morphine  - Injectable 2 milliGRAM(s) IV Push every 4 hours PRN Severe Pain (7 - 10)  HYDROmorphone  Injectable 0.5 milliGRAM(s) IV Push every 10 minutes PRN Severe Pain (7 - 10)  ondansetron Injectable 4 milliGRAM(s) IV Push once PRN Nausea and/or Vomiting      Allergies    No Known Allergies    Review of Systems:  Constitutional: No fever  Cardiovascular: No chest pain, palpitations  Respiratory: No SOB, no cough  GI: No nausea, vomiting, abdominal pain  Psych: no depression  Endocrine: no polyuria, polydipsia      ALL OTHER SYSTEMS REVIEWED AND NEGATIVE      PHYSICAL EXAM:  VITALS: T(C): 36.4 (08-26-17 @ 05:21)  T(F): 97.6 (08-26-17 @ 05:21), Max: 99.3 (08-25-17 @ 16:35)  HR: 72 (08-26-17 @ 05:21) (72 - 91)  BP: 110/70 (08-26-17 @ 05:21) (101/54 - 150/83)  RR:  (13 - 27)  SpO2:  (94% - 100%)  Wt(kg): --  GENERAL: NAD, well-groomed, well-developed  RESPIRATORY: Clear to auscultation bilaterally; No rales, rhonchi, wheezing, or rubs  CARDIOVASCULAR: Regular rate and rhythm; No murmurs; no peripheral edema  GI: Soft, nontender, non distended, normal bowel sounds    CAPILLARY BLOOD GLUCOSE  261 (08-26 @ 12:14)  248 (08-26 @ 08:52)  223 (08-25 @ 20:00)  201 (08-25 @ 16:35)  242 (08-25 @ 12:21)  198 (08-25 @ 08:36)  259 (08-24 @ 22:03)  249 (08-24 @ 16:16)  264 (08-24 @ 11:51)      08-26    138  |  101  |  12  ----------------------------<  265<H>  3.4<L>   |  22  |  1.16    EGFR if : 85  EGFR if non : 74    Ca    8.5      08-26  Mg     1.5     08-24  Phos  3.6     08-24    TPro  7.1  /  Alb  3.1<L>  /  TBili  1.0  /  DBili  x   /  AST  11  /  ALT  7   /  AlkPhos  114  08-24          Thyroid Function Tests:  08-24 @ 05:51 TSH 2.00 FreeT4 -- T3 -- Anti TPO -- Anti Thyroglobulin Ab -- TSI --      Hemoglobin A1C, Whole Blood: 15.3 % <H> [4.0 - 5.6] (08-24-17 @ 05:51)

## 2017-08-26 NOTE — DIETITIAN INITIAL EVALUATION ADULT. - PROBLEM SELECTOR PLAN 1
- upper inner thigh - onset since Sunday and worsening over time & assoc pain, w/ fevers, chills, sweats, headache  - history of intermittent home treated skin ulcers/boils - the last being 2 years ago  - CT scan of pelvis demonstrative of "Moderate soft tissue swelling and subcutaneous inflammatory change in the left groin likely representing a cellulitis. No associated focal fluid collection to suggest an abscess. No associated subcutaneous gas."  - started on vancomycin 1 gram and zosyn 3.375 grams Q8H; continued  - blood cultures in progress (pls f/u results)  - analgesic PRN  - antipyretic  - IVF - s/p 2 liters NS bolus in the ED; additional 2 liters prescribed, with subsequent maintenance - especially since patient's blood pressure became borderline low  - patient reports mild improvement present, but if worsens, please call surgical consult

## 2017-08-26 NOTE — DIETITIAN INITIAL EVALUATION ADULT. - NS AS NUTRI INTERV COLLABORAT3
1)Add DASH/TLC (cholesterol & Na restricted) modification to Consistent Carbohydrate diet.                             2)Obtain weekly weights                                                     3)RDN remains availalble.      Klyee Nugent RDN, CD/N  pager [phone]

## 2017-08-26 NOTE — PROGRESS NOTE ADULT - PROBLEM SELECTOR PLAN 2
- continue with basal bolus regimen in hospital - may increase in am   - DM education and nutrition consult   - pt know with a better understanding of his uncontrolled DM and may consider home d/c with insulin  - f/u endo recs

## 2017-08-26 NOTE — DIETITIAN INITIAL EVALUATION ADULT. - PROBLEM SELECTOR PLAN 6
- started on anti-lipid medication one week ago, but patient unable to recall name  - will start zocor 20 mg HS

## 2017-08-26 NOTE — PROGRESS NOTE ADULT - SUBJECTIVE AND OBJECTIVE BOX
Patient is a 47y old  Male who presents with a chief complaint of Pain and swelling of the left thigh, as well as fevers, chills and sweating (24 Aug 2017 12:19)      SUBJECTIVE / OVERNIGHT EVENTS:  pain in L groin/inner thigh has significantly improved.  pt is now understanding the degree of his DM and is seriously considering taking insulin at home in order to help his wounds heal.     MEDICATIONS  (STANDING):  piperacillin/tazobactam IVPB. 3.375 Gram(s) IV Intermittent every 8 hours  simvastatin 20 milliGRAM(s) Oral at bedtime  insulin glargine Injectable (LANTUS) 28 Unit(s) SubCutaneous at bedtime  insulin lispro Injectable (HumaLOG) 9 Unit(s) SubCutaneous three times a day before meals  insulin lispro (HumaLOG) corrective regimen sliding scale   SubCutaneous three times a day before meals  insulin lispro (HumaLOG) corrective regimen sliding scale   SubCutaneous at bedtime  heparin  Injectable 7500 Unit(s) SubCutaneous every 8 hours  sodium chloride 0.9%. 1000 milliLiter(s) (75 mL/Hr) IV Continuous <Continuous>  vancomycin  IVPB 1000 milliGRAM(s) IV Intermittent every 12 hours    MEDICATIONS  (PRN):  acetaminophen   Tablet. 650 milliGRAM(s) Oral every 6 hours PRN Mild Pain (1 - 3)  oxyCODONE    5 mG/acetaminophen 325 mG 1 Tablet(s) Oral every 4 hours PRN Moderate Pain (4 - 6)  acetaminophen   Tablet 650 milliGRAM(s) Oral every 6 hours PRN For Temp greater than 38 C (100.4 F)  morphine  - Injectable 2 milliGRAM(s) IV Push every 4 hours PRN Severe Pain (7 - 10)  HYDROmorphone  Injectable 0.5 milliGRAM(s) IV Push every 10 minutes PRN Severe Pain (7 - 10)  ondansetron Injectable 4 milliGRAM(s) IV Push once PRN Nausea and/or Vomiting      Vital Signs Last 24 Hrs  T(C): 36.4 (26 Aug 2017 05:21), Max: 37.4 (25 Aug 2017 16:35)  T(F): 97.6 (26 Aug 2017 05:21), Max: 99.3 (25 Aug 2017 16:35)  HR: 72 (26 Aug 2017 05:21) (72 - 97)  BP: 110/70 (26 Aug 2017 05:21) (101/54 - 150/83)  BP(mean): --  RR: 17 (26 Aug 2017 05:21) (13 - 27)  SpO2: 99% (26 Aug 2017 05:21) (94% - 100%)  CAPILLARY BLOOD GLUCOSE  248 (26 Aug 2017 08:52)  223 (25 Aug 2017 20:00)  201 (25 Aug 2017 16:35)  242 (25 Aug 2017 12:21)        I&O's Summary    25 Aug 2017 07:01  -  26 Aug 2017 07:00  --------------------------------------------------------  IN: 660 mL / OUT: 825 mL / NET: -165 mL        PHYSICAL EXAM:  GENERAL: NAD, well-developed  HEAD:  Atraumatic, Normocephalic  EYES: EOMI, PERRLA, conjunctiva and sclera clear  NECK: Supple, No JVD  CHEST/LUNG: Clear to auscultation bilaterally; No wheeze  HEART: Regular rate and rhythm; No murmurs  ABDOMEN: Soft, Nontender, Nondistended; Bowel sounds present  EXTREMITIES:  2+ Peripheral Pulses, No clubbing, cyanosis, or edema  PSYCH: AAOx3  NEUROLOGY: non-focal  SKIN:  L upper/inner thigh soft tissue swelling with TTP and induration.  also with induration/TTP superior to inguinal ligament; s/p 2 drains by surgery      LABS:                        12.9   12.14 )-----------( 190      ( 26 Aug 2017 07:54 )             40.3     08-26    138  |  101  |  12  ----------------------------<  265<H>  3.4<L>   |  22  |  1.16    Ca    8.5      26 Aug 2017 07:54

## 2017-08-26 NOTE — DIETITIAN INITIAL EVALUATION ADULT. - NS AS NUTRI INTERV ED CONTENT
Nutrition relationship to health/disease/Recommended modifications/Purpose of the nutrition education/Priority modifications

## 2017-08-26 NOTE — DIETITIAN INITIAL EVALUATION ADULT. - OTHER INFO
Pt. reports fair appetite/PO Intake & denies food allergies, nausea/vomiting/diarrhea/constipation, or issues with chewing/swallowing.  States he only has recently been Dx'd with DM x <1mth ago.  Extensively discussed therapeutic diet modifications.  Pt. typically consumes 1-2 meals/day, and drinks lemonade.  Encouraged consistent carbohydrate, heart healthy food intake, as well as avoidance of concentrated sweetened beverages.  Informed Pt. of HbA1c & findings, as well as risks associated w prolonged hyperglycemia.  Per RN and physician, Pt. has been reluctant to learn about insulin administration and finger sticks.   Pt. w limited understanding and acceptance of nutritional instruction.  Advised RDN remains available.

## 2017-08-26 NOTE — DIETITIAN INITIAL EVALUATION ADULT. - SIGNS/SYMPTOMS
Physical appearance, BMI=43.0 HbA1c 15.3.  Elevated glucose/fingersticks.  Pt. unable to teach back understanding of diet.

## 2017-08-26 NOTE — DIETITIAN INITIAL EVALUATION ADULT. - PROBLEM SELECTOR PLAN 5
- has been loosing weight intentionally; weight loss of ~ 34 pounds since 04/2017  - suspicion for GAURAV because of patient's body habitus, report of snoring and daily tiredness; consider further evaluation for same  - continue to encourage healthy weight loss (should also help with DM and suspected GAURAV, general wellbeing); consider nutrition consult  -

## 2017-08-27 LAB
BUN SERPL-MCNC: 13 MG/DL — SIGNIFICANT CHANGE UP (ref 7–23)
CALCIUM SERPL-MCNC: 8.3 MG/DL — LOW (ref 8.4–10.5)
CHLORIDE SERPL-SCNC: 103 MMOL/L — SIGNIFICANT CHANGE UP (ref 98–107)
CO2 SERPL-SCNC: 26 MMOL/L — SIGNIFICANT CHANGE UP (ref 22–31)
CREAT SERPL-MCNC: 0.99 MG/DL — SIGNIFICANT CHANGE UP (ref 0.5–1.3)
GLUCOSE SERPL-MCNC: 227 MG/DL — HIGH (ref 70–99)
HCT VFR BLD CALC: 37.9 % — LOW (ref 39–50)
HGB BLD-MCNC: 12.4 G/DL — LOW (ref 13–17)
MCHC RBC-ENTMCNC: 29.5 PG — SIGNIFICANT CHANGE UP (ref 27–34)
MCHC RBC-ENTMCNC: 32.7 % — SIGNIFICANT CHANGE UP (ref 32–36)
MCV RBC AUTO: 90.2 FL — SIGNIFICANT CHANGE UP (ref 80–100)
NRBC # FLD: 0 — SIGNIFICANT CHANGE UP
PLATELET # BLD AUTO: 195 K/UL — SIGNIFICANT CHANGE UP (ref 150–400)
PMV BLD: 11.7 FL — SIGNIFICANT CHANGE UP (ref 7–13)
POTASSIUM SERPL-MCNC: 3.3 MMOL/L — LOW (ref 3.5–5.3)
POTASSIUM SERPL-SCNC: 3.3 MMOL/L — LOW (ref 3.5–5.3)
RBC # BLD: 4.2 M/UL — SIGNIFICANT CHANGE UP (ref 4.2–5.8)
RBC # FLD: 13.1 % — SIGNIFICANT CHANGE UP (ref 10.3–14.5)
SODIUM SERPL-SCNC: 141 MMOL/L — SIGNIFICANT CHANGE UP (ref 135–145)
VANCOMYCIN TROUGH SERPL-MCNC: 8.1 UG/ML — LOW (ref 10–20)
WBC # BLD: 8.52 K/UL — SIGNIFICANT CHANGE UP (ref 3.8–10.5)
WBC # FLD AUTO: 8.52 K/UL — SIGNIFICANT CHANGE UP (ref 3.8–10.5)

## 2017-08-27 PROCEDURE — 99233 SBSQ HOSP IP/OBS HIGH 50: CPT

## 2017-08-27 RX ORDER — INSULIN GLARGINE 100 [IU]/ML
20 INJECTION, SOLUTION SUBCUTANEOUS AT BEDTIME
Qty: 0 | Refills: 0 | Status: DISCONTINUED | OUTPATIENT
Start: 2017-08-27 | End: 2017-08-27

## 2017-08-27 RX ORDER — VANCOMYCIN HCL 1 G
1250 VIAL (EA) INTRAVENOUS ONCE
Qty: 0 | Refills: 0 | Status: DISCONTINUED | OUTPATIENT
Start: 2017-08-27 | End: 2017-08-27

## 2017-08-27 RX ORDER — POTASSIUM CHLORIDE 20 MEQ
20 PACKET (EA) ORAL ONCE
Qty: 0 | Refills: 0 | Status: COMPLETED | OUTPATIENT
Start: 2017-08-27 | End: 2017-08-27

## 2017-08-27 RX ORDER — VANCOMYCIN HCL 1 G
1250 VIAL (EA) INTRAVENOUS EVERY 12 HOURS
Qty: 0 | Refills: 0 | Status: DISCONTINUED | OUTPATIENT
Start: 2017-08-27 | End: 2017-08-29

## 2017-08-27 RX ORDER — VANCOMYCIN HCL 1 G
1250 VIAL (EA) INTRAVENOUS EVERY 12 HOURS
Qty: 0 | Refills: 0 | Status: DISCONTINUED | OUTPATIENT
Start: 2017-08-27 | End: 2017-08-27

## 2017-08-27 RX ORDER — SENNA PLUS 8.6 MG/1
2 TABLET ORAL AT BEDTIME
Qty: 0 | Refills: 0 | Status: DISCONTINUED | OUTPATIENT
Start: 2017-08-27 | End: 2017-08-29

## 2017-08-27 RX ORDER — DOCUSATE SODIUM 100 MG
100 CAPSULE ORAL THREE TIMES A DAY
Qty: 0 | Refills: 0 | Status: DISCONTINUED | OUTPATIENT
Start: 2017-08-27 | End: 2017-08-29

## 2017-08-27 RX ORDER — VANCOMYCIN HCL 1 G
VIAL (EA) INTRAVENOUS
Qty: 0 | Refills: 0 | Status: DISCONTINUED | OUTPATIENT
Start: 2017-08-27 | End: 2017-08-27

## 2017-08-27 RX ORDER — INSULIN GLARGINE 100 [IU]/ML
20 INJECTION, SOLUTION SUBCUTANEOUS ONCE
Qty: 0 | Refills: 0 | Status: COMPLETED | OUTPATIENT
Start: 2017-08-27 | End: 2017-08-27

## 2017-08-27 RX ADMIN — HEPARIN SODIUM 7500 UNIT(S): 5000 INJECTION INTRAVENOUS; SUBCUTANEOUS at 13:25

## 2017-08-27 RX ADMIN — Medication 11 UNIT(S): at 17:58

## 2017-08-27 RX ADMIN — PIPERACILLIN AND TAZOBACTAM 25 GRAM(S): 4; .5 INJECTION, POWDER, LYOPHILIZED, FOR SOLUTION INTRAVENOUS at 02:17

## 2017-08-27 RX ADMIN — Medication 11 UNIT(S): at 13:24

## 2017-08-27 RX ADMIN — Medication 100 MILLIGRAM(S): at 22:53

## 2017-08-27 RX ADMIN — Medication 100 MILLIGRAM(S): at 13:42

## 2017-08-27 RX ADMIN — HEPARIN SODIUM 7500 UNIT(S): 5000 INJECTION INTRAVENOUS; SUBCUTANEOUS at 22:53

## 2017-08-27 RX ADMIN — OXYCODONE AND ACETAMINOPHEN 1 TABLET(S): 5; 325 TABLET ORAL at 08:51

## 2017-08-27 RX ADMIN — Medication 166.67 MILLIGRAM(S): at 22:52

## 2017-08-27 RX ADMIN — OXYCODONE AND ACETAMINOPHEN 1 TABLET(S): 5; 325 TABLET ORAL at 18:51

## 2017-08-27 RX ADMIN — Medication 2: at 08:52

## 2017-08-27 RX ADMIN — Medication 11 UNIT(S): at 08:54

## 2017-08-27 RX ADMIN — HEPARIN SODIUM 7500 UNIT(S): 5000 INJECTION INTRAVENOUS; SUBCUTANEOUS at 05:20

## 2017-08-27 RX ADMIN — SIMVASTATIN 20 MILLIGRAM(S): 20 TABLET, FILM COATED ORAL at 22:53

## 2017-08-27 RX ADMIN — OXYCODONE AND ACETAMINOPHEN 1 TABLET(S): 5; 325 TABLET ORAL at 09:30

## 2017-08-27 RX ADMIN — SODIUM CHLORIDE 75 MILLILITER(S): 9 INJECTION INTRAMUSCULAR; INTRAVENOUS; SUBCUTANEOUS at 22:55

## 2017-08-27 RX ADMIN — OXYCODONE AND ACETAMINOPHEN 1 TABLET(S): 5; 325 TABLET ORAL at 17:57

## 2017-08-27 RX ADMIN — Medication 4: at 12:38

## 2017-08-27 RX ADMIN — PIPERACILLIN AND TAZOBACTAM 25 GRAM(S): 4; .5 INJECTION, POWDER, LYOPHILIZED, FOR SOLUTION INTRAVENOUS at 09:38

## 2017-08-27 RX ADMIN — SENNA PLUS 2 TABLET(S): 8.6 TABLET ORAL at 22:53

## 2017-08-27 RX ADMIN — Medication 20 MILLIEQUIVALENT(S): at 12:38

## 2017-08-27 RX ADMIN — SODIUM CHLORIDE 75 MILLILITER(S): 9 INJECTION INTRAMUSCULAR; INTRAVENOUS; SUBCUTANEOUS at 02:17

## 2017-08-27 RX ADMIN — Medication 2: at 17:58

## 2017-08-27 RX ADMIN — INSULIN GLARGINE 20 UNIT(S): 100 INJECTION, SOLUTION SUBCUTANEOUS at 23:08

## 2017-08-27 RX ADMIN — Medication 250 MILLIGRAM(S): at 09:35

## 2017-08-27 NOTE — PROGRESS NOTE ADULT - PROBLEM SELECTOR PLAN 2
- appreciate endo recs  - basal bolus regimen - follow FS  - DM education and nutrition consult   - pt know with a better understanding of his uncontrolled DM and may consider home d/c with insulin

## 2017-08-27 NOTE — PROGRESS NOTE ADULT - ASSESSMENT
47yM w/ L groin abscess s/p I&D  - Care per primary  - f/u speciation of intraop cultures  - c/w IV abx  - c/w Penrose drainage

## 2017-08-27 NOTE — PROGRESS NOTE ADULT - PROBLEM SELECTOR PLAN 1
- CT negative for abscess formation  - continue with vanco/zosyn - monitor trough   - follow up blood cultures  - pain control
- CT negative for abscess formation but now with pus/serosanguinous drainage   - continue with vanco/zosyn - monitor trough   - follow up blood cultures  - pain control  - check US of area for evolving abscess formation   - surg eval for possible i&d
- s/p I&D by surgery on 8/25  - continue with vanco/zosyn - monitor trough in am  - follow up wound cultures  - pain control  - wound care
- s/p I&D by surgery on 8/25  - vanco increased - check another trough prior to fourth dose  - can d/c zosyn for now given cxs  - follow up sensitivities  - pain control  - wound care  - would obtain CELINE boo in am
FS remains elevated  Would increase Lantus to 33u qhs.  Increase Humalog to 11u TID  - c/w moderate correction scale  Continue to reinforce with patient the need for insulin at discharge.  Prefer basal bolus, less optimal basal plus orals. Orals only at dc not recommended. Continue to educate patient.  Outpatient endocrine follow up [phone].
Bg trending down but remain elevated.  Would increase Lantus to 28u qhs.  Increase Humalog to 9/9/9  Increase correction scales to moderate.  Continue to reinforce with patient the need for insulin at discharge.  Prefer basal bolus, less optimal basal plus orals. Orals only at dc not recommended. Continue to educate patient.  Outpatient endocrine follow up [phone].

## 2017-08-27 NOTE — PROGRESS NOTE ADULT - SUBJECTIVE AND OBJECTIVE BOX
GENERAL SURGERY DAILY PROGRESS NOTE:    ===================================  B Team Surgical Service Pager [phone]  ===================================    Subjective:  Pain controlled. Denies N/V. Tolerating diet. Endorses drainage from the incision. Denies fever, chills, sweats.        Objective:    PE:  Gen: NAD  Resp: airway patent, respirations unlabored, no increased WoB  CVS: RRR  Abd: soft, Obese, NT, no rebound or guarding  Groin: L groin incision clean w/ small amount of seropurulent drainage, penrose in place  Ext: no edema, WWP  Neuro: AAOx3, no focal deficits    Vital Signs Last 24 Hrs  T(C): 36.7 (27 Aug 2017 05:16), Max: 36.7 (26 Aug 2017 15:02)  T(F): 98.1 (27 Aug 2017 05:16), Max: 98.1 (26 Aug 2017 15:02)  HR: 82 (27 Aug 2017 05:16) (77 - 82)  BP: 126/88 (27 Aug 2017 05:16) (104/69 - 126/88)  BP(mean): --  RR: 18 (27 Aug 2017 05:16) (17 - 18)  SpO2: 97% (27 Aug 2017 05:16) (97% - 97%)    I&O's Detail      Daily     Daily Weight in k.8 (26 Aug 2017 11:26)    MEDICATIONS  (STANDING):  piperacillin/tazobactam IVPB. 3.375 Gram(s) IV Intermittent every 8 hours  simvastatin 20 milliGRAM(s) Oral at bedtime  insulin lispro (HumaLOG) corrective regimen sliding scale   SubCutaneous three times a day before meals  insulin lispro (HumaLOG) corrective regimen sliding scale   SubCutaneous at bedtime  heparin  Injectable 7500 Unit(s) SubCutaneous every 8 hours  sodium chloride 0.9%. 1000 milliLiter(s) (75 mL/Hr) IV Continuous <Continuous>  vancomycin  IVPB 1000 milliGRAM(s) IV Intermittent every 12 hours  insulin lispro Injectable (HumaLOG) 11 Unit(s) SubCutaneous three times a day before meals  insulin glargine Injectable (LANTUS) 33 Unit(s) SubCutaneous at bedtime    MEDICATIONS  (PRN):  acetaminophen   Tablet. 650 milliGRAM(s) Oral every 6 hours PRN Mild Pain (1 - 3)  oxyCODONE    5 mG/acetaminophen 325 mG 1 Tablet(s) Oral every 4 hours PRN Moderate Pain (4 - 6)  acetaminophen   Tablet 650 milliGRAM(s) Oral every 6 hours PRN For Temp greater than 38 C (100.4 F)  morphine  - Injectable 2 milliGRAM(s) IV Push every 4 hours PRN Severe Pain (7 - 10)  HYDROmorphone  Injectable 0.5 milliGRAM(s) IV Push every 10 minutes PRN Severe Pain (7 - 10)  ondansetron Injectable 4 milliGRAM(s) IV Push once PRN Nausea and/or Vomiting      LABS:                        12.4   8.52  )-----------( 195      ( 27 Aug 2017 07:28 )             37.9     08-27    141  |  103  |  13  ----------------------------<  227<H>  3.3<L>   |  26  |  0.99    Ca    8.3<L>      27 Aug 2017 07:28            RADIOLOGY & ADDITIONAL STUDIES:

## 2017-08-27 NOTE — PROGRESS NOTE ADULT - SUBJECTIVE AND OBJECTIVE BOX
Patient is a 47y old  Male who presents with a chief complaint of Pain and swelling of the left thigh, as well as fevers, chills and sweating (24 Aug 2017 12:19)      SUBJECTIVE / OVERNIGHT EVENTS:  pain in groin but is improving from before.      MEDICATIONS  (STANDING):  piperacillin/tazobactam IVPB. 3.375 Gram(s) IV Intermittent every 8 hours  simvastatin 20 milliGRAM(s) Oral at bedtime  insulin lispro (HumaLOG) corrective regimen sliding scale   SubCutaneous three times a day before meals  insulin lispro (HumaLOG) corrective regimen sliding scale   SubCutaneous at bedtime  heparin  Injectable 7500 Unit(s) SubCutaneous every 8 hours  sodium chloride 0.9%. 1000 milliLiter(s) (75 mL/Hr) IV Continuous <Continuous>  insulin lispro Injectable (HumaLOG) 11 Unit(s) SubCutaneous three times a day before meals  insulin glargine Injectable (LANTUS) 33 Unit(s) SubCutaneous at bedtime  docusate sodium 100 milliGRAM(s) Oral three times a day  senna 2 Tablet(s) Oral at bedtime  vancomycin  IVPB   IV Intermittent     MEDICATIONS  (PRN):  acetaminophen   Tablet. 650 milliGRAM(s) Oral every 6 hours PRN Mild Pain (1 - 3)  oxyCODONE    5 mG/acetaminophen 325 mG 1 Tablet(s) Oral every 4 hours PRN Moderate Pain (4 - 6)  acetaminophen   Tablet 650 milliGRAM(s) Oral every 6 hours PRN For Temp greater than 38 C (100.4 F)  morphine  - Injectable 2 milliGRAM(s) IV Push every 4 hours PRN Severe Pain (7 - 10)  HYDROmorphone  Injectable 0.5 milliGRAM(s) IV Push every 10 minutes PRN Severe Pain (7 - 10)  ondansetron Injectable 4 milliGRAM(s) IV Push once PRN Nausea and/or Vomiting      Vital Signs Last 24 Hrs  T(C): 36.7 (27 Aug 2017 05:16), Max: 36.7 (26 Aug 2017 15:02)  T(F): 98.1 (27 Aug 2017 05:16), Max: 98.1 (26 Aug 2017 15:02)  HR: 82 (27 Aug 2017 05:16) (77 - 82)  BP: 126/88 (27 Aug 2017 05:16) (104/69 - 126/88)  BP(mean): --  RR: 18 (27 Aug 2017 05:16) (17 - 18)  SpO2: 97% (27 Aug 2017 05:16) (97% - 97%)  CAPILLARY BLOOD GLUCOSE  192 (27 Aug 2017 08:46)  302 (26 Aug 2017 22:36)  180 (26 Aug 2017 16:32)  261 (26 Aug 2017 12:14)        PHYSICAL EXAM:  GENERAL: NAD, well-developed  HEAD:  Atraumatic, Normocephalic  EYES: EOMI, PERRLA, conjunctiva and sclera clear  NECK: Supple, No JVD  CHEST/LUNG: Clear to auscultation bilaterally; No wheeze  HEART: Regular rate and rhythm; No murmurs  ABDOMEN: Soft, Nontender, Nondistended; Bowel sounds present  EXTREMITIES:  2+ Peripheral Pulses, No clubbing, cyanosis, or edema  PSYCH: AAOx3  NEUROLOGY: non-focal  SKIN:  L upper/inner thigh soft tissue swelling with TTP and induration.  also with induration/TTP superior to inguinal ligament; s/p 2 drains by surgery        LABS:                        12.4   8.52  )-----------( 195      ( 27 Aug 2017 07:28 )             37.9     08-27    141  |  103  |  13  ----------------------------<  227<H>  3.3<L>   |  26  |  0.99    Ca    8.3<L>      27 Aug 2017 07:28                RADIOLOGY & ADDITIONAL TESTS:    Imaging Personally Reviewed:    Consultant(s) Notes Reviewed:      Care Discussed with Consultants/Other Providers:

## 2017-08-28 ENCOUNTER — TRANSCRIPTION ENCOUNTER (OUTPATIENT)
Age: 47
End: 2017-08-28

## 2017-08-28 LAB
-  CEFTRIAXONE: SIGNIFICANT CHANGE UP
-  CEFTRIAXONE: SIGNIFICANT CHANGE UP
-  CLINDAMYCIN: SIGNIFICANT CHANGE UP
-  CLINDAMYCIN: SIGNIFICANT CHANGE UP
-  ERYTHROMYCIN: SIGNIFICANT CHANGE UP
-  ERYTHROMYCIN: SIGNIFICANT CHANGE UP
-  PENICILLIN G: SIGNIFICANT CHANGE UP
-  PENICILLIN G: SIGNIFICANT CHANGE UP
-  VANCOMYCIN: SIGNIFICANT CHANGE UP
-  VANCOMYCIN: SIGNIFICANT CHANGE UP
BACTERIA BLD CULT: SIGNIFICANT CHANGE UP
BACTERIA BLD CULT: SIGNIFICANT CHANGE UP
BUN SERPL-MCNC: 9 MG/DL — SIGNIFICANT CHANGE UP (ref 7–23)
CALCIUM SERPL-MCNC: 8.8 MG/DL — SIGNIFICANT CHANGE UP (ref 8.4–10.5)
CHLORIDE SERPL-SCNC: 101 MMOL/L — SIGNIFICANT CHANGE UP (ref 98–107)
CO2 SERPL-SCNC: 26 MMOL/L — SIGNIFICANT CHANGE UP (ref 22–31)
CREAT SERPL-MCNC: 0.9 MG/DL — SIGNIFICANT CHANGE UP (ref 0.5–1.3)
GLUCOSE SERPL-MCNC: 153 MG/DL — HIGH (ref 70–99)
GRAM STN WND: SIGNIFICANT CHANGE UP
GRAM STN WND: SIGNIFICANT CHANGE UP
HCT VFR BLD CALC: 39.4 % — SIGNIFICANT CHANGE UP (ref 39–50)
HGB BLD-MCNC: 12.6 G/DL — LOW (ref 13–17)
MCHC RBC-ENTMCNC: 28.7 PG — SIGNIFICANT CHANGE UP (ref 27–34)
MCHC RBC-ENTMCNC: 32 % — SIGNIFICANT CHANGE UP (ref 32–36)
MCV RBC AUTO: 89.7 FL — SIGNIFICANT CHANGE UP (ref 80–100)
METHOD TYPE: SIGNIFICANT CHANGE UP
METHOD TYPE: SIGNIFICANT CHANGE UP
NRBC # FLD: 0.03 — SIGNIFICANT CHANGE UP
ORGANISM # SPEC MICROSCOPIC CNT: SIGNIFICANT CHANGE UP
PLATELET # BLD AUTO: 211 K/UL — SIGNIFICANT CHANGE UP (ref 150–400)
PMV BLD: 11.6 FL — SIGNIFICANT CHANGE UP (ref 7–13)
POTASSIUM SERPL-MCNC: 3.1 MMOL/L — LOW (ref 3.5–5.3)
POTASSIUM SERPL-SCNC: 3.1 MMOL/L — LOW (ref 3.5–5.3)
RBC # BLD: 4.39 M/UL — SIGNIFICANT CHANGE UP (ref 4.2–5.8)
RBC # FLD: 13.2 % — SIGNIFICANT CHANGE UP (ref 10.3–14.5)
SODIUM SERPL-SCNC: 139 MMOL/L — SIGNIFICANT CHANGE UP (ref 135–145)
VANCOMYCIN FLD-MCNC: 10.2 UG/ML — SIGNIFICANT CHANGE UP
WBC # BLD: 9.09 K/UL — SIGNIFICANT CHANGE UP (ref 3.8–10.5)
WBC # FLD AUTO: 9.09 K/UL — SIGNIFICANT CHANGE UP (ref 3.8–10.5)

## 2017-08-28 RX ORDER — DEXTROSE MONOHYDRATE, SODIUM CHLORIDE, AND POTASSIUM CHLORIDE 50; .745; 4.5 G/1000ML; G/1000ML; G/1000ML
1000 INJECTION, SOLUTION INTRAVENOUS
Qty: 0 | Refills: 0 | Status: DISCONTINUED | OUTPATIENT
Start: 2017-08-28 | End: 2017-08-28

## 2017-08-28 RX ORDER — POLYETHYLENE GLYCOL 3350 17 G/17G
17 POWDER, FOR SOLUTION ORAL ONCE
Qty: 0 | Refills: 0 | Status: COMPLETED | OUTPATIENT
Start: 2017-08-28 | End: 2017-08-28

## 2017-08-28 RX ORDER — POTASSIUM CHLORIDE 20 MEQ
10 PACKET (EA) ORAL
Qty: 0 | Refills: 0 | Status: COMPLETED | OUTPATIENT
Start: 2017-08-28 | End: 2017-08-28

## 2017-08-28 RX ADMIN — Medication 100 MILLIEQUIVALENT(S): at 10:48

## 2017-08-28 RX ADMIN — OXYCODONE AND ACETAMINOPHEN 1 TABLET(S): 5; 325 TABLET ORAL at 12:49

## 2017-08-28 RX ADMIN — POLYETHYLENE GLYCOL 3350 17 GRAM(S): 17 POWDER, FOR SOLUTION ORAL at 06:35

## 2017-08-28 RX ADMIN — INSULIN GLARGINE 33 UNIT(S): 100 INJECTION, SOLUTION SUBCUTANEOUS at 23:42

## 2017-08-28 RX ADMIN — SODIUM CHLORIDE 75 MILLILITER(S): 9 INJECTION INTRAMUSCULAR; INTRAVENOUS; SUBCUTANEOUS at 08:40

## 2017-08-28 RX ADMIN — Medication 2: at 12:14

## 2017-08-28 RX ADMIN — Medication 100 MILLIEQUIVALENT(S): at 09:44

## 2017-08-28 RX ADMIN — Medication 100 MILLIGRAM(S): at 06:09

## 2017-08-28 RX ADMIN — Medication 166.67 MILLIGRAM(S): at 12:14

## 2017-08-28 RX ADMIN — OXYCODONE AND ACETAMINOPHEN 1 TABLET(S): 5; 325 TABLET ORAL at 22:02

## 2017-08-28 RX ADMIN — Medication 2: at 23:44

## 2017-08-28 RX ADMIN — OXYCODONE AND ACETAMINOPHEN 1 TABLET(S): 5; 325 TABLET ORAL at 13:20

## 2017-08-28 RX ADMIN — OXYCODONE AND ACETAMINOPHEN 1 TABLET(S): 5; 325 TABLET ORAL at 23:02

## 2017-08-28 RX ADMIN — OXYCODONE AND ACETAMINOPHEN 1 TABLET(S): 5; 325 TABLET ORAL at 01:46

## 2017-08-28 RX ADMIN — Medication 11 UNIT(S): at 12:14

## 2017-08-28 RX ADMIN — Medication 100 MILLIEQUIVALENT(S): at 08:40

## 2017-08-28 RX ADMIN — HEPARIN SODIUM 7500 UNIT(S): 5000 INJECTION INTRAVENOUS; SUBCUTANEOUS at 17:15

## 2017-08-28 RX ADMIN — HEPARIN SODIUM 7500 UNIT(S): 5000 INJECTION INTRAVENOUS; SUBCUTANEOUS at 22:01

## 2017-08-28 RX ADMIN — SENNA PLUS 2 TABLET(S): 8.6 TABLET ORAL at 22:02

## 2017-08-28 RX ADMIN — HEPARIN SODIUM 7500 UNIT(S): 5000 INJECTION INTRAVENOUS; SUBCUTANEOUS at 06:10

## 2017-08-28 RX ADMIN — SIMVASTATIN 20 MILLIGRAM(S): 20 TABLET, FILM COATED ORAL at 22:02

## 2017-08-28 RX ADMIN — Medication 11 UNIT(S): at 17:18

## 2017-08-28 RX ADMIN — Medication 11 UNIT(S): at 08:39

## 2017-08-28 RX ADMIN — Medication 100 MILLIGRAM(S): at 17:15

## 2017-08-28 RX ADMIN — OXYCODONE AND ACETAMINOPHEN 1 TABLET(S): 5; 325 TABLET ORAL at 02:46

## 2017-08-28 RX ADMIN — Medication 100 MILLIGRAM(S): at 23:42

## 2017-08-28 NOTE — PROGRESS NOTE ADULT - SUBJECTIVE AND OBJECTIVE BOX
S: Patient states he has no complaints.  No fevers or chills, but states he has some groin tenderness.    O: Vital Signs Last 24 Hrs  T(C): 36.4 (28 Aug 2017 06:05), Max: 36.7 (27 Aug 2017 13:05)  T(F): 97.6 (28 Aug 2017 06:05), Max: 98 (27 Aug 2017 13:05)  HR: 83 (28 Aug 2017 06:05) (74 - 85)  BP: 126/78 (28 Aug 2017 06:05) (120/68 - 129/85)  BP(mean): --  RR: 18 (28 Aug 2017 06:05) (18 - 18)  SpO2: 97% (28 Aug 2017 06:05) (97% - 97%)    I&O's Detail    28 Aug 2017 07:01  -  28 Aug 2017 09:49  --------------------------------------------------------  IN:    IV PiggyBack: 100 mL  Total IN: 100 mL    OUT:  Total OUT: 0 mL    Total NET: 100 mL        Gen: NAD  Resp: airway patent, respirations unlabored, no increased WoB  CVS: RRR  Abd: soft, Obese, NT, no rebound or guarding  Groin: L groin incision clean w/ small amount of seropurulent drainage from inferior aspect, penrose in place x2.  pink granulation tissue at wound  Ext: no edema, WWP  Neuro: AAOx3, no focal deficits.                          12.6   9.09  )-----------( 211      ( 28 Aug 2017 05:50 )             39.4   08-28    139  |  101  |  9   ----------------------------<  153<H>  3.1<L>   |  26  |  0.90    Ca    8.8      28 Aug 2017 05:50

## 2017-08-28 NOTE — PROGRESS NOTE ADULT - ASSESSMENT
47yM w/ L groin abscess s/p I&D  - f/u speciation of intraop cultures  - c/w IV abx  - c/w Penrose drainage  - c/w daily dressing changes    KRISTYN Simon PGYII #[id number]

## 2017-08-29 VITALS
DIASTOLIC BLOOD PRESSURE: 90 MMHG | RESPIRATION RATE: 18 BRPM | HEART RATE: 82 BPM | SYSTOLIC BLOOD PRESSURE: 137 MMHG | OXYGEN SATURATION: 100 % | TEMPERATURE: 99 F

## 2017-08-29 DIAGNOSIS — A49.1 STREPTOCOCCAL INFECTION, UNSPECIFIED SITE: ICD-10-CM

## 2017-08-29 DIAGNOSIS — L02.419 CUTANEOUS ABSCESS OF LIMB, UNSPECIFIED: ICD-10-CM

## 2017-08-29 DIAGNOSIS — A41.9 SEPSIS, UNSPECIFIED ORGANISM: ICD-10-CM

## 2017-08-29 LAB
BUN SERPL-MCNC: 7 MG/DL — SIGNIFICANT CHANGE UP (ref 7–23)
CALCIUM SERPL-MCNC: 9.1 MG/DL — SIGNIFICANT CHANGE UP (ref 8.4–10.5)
CHLORIDE SERPL-SCNC: 100 MMOL/L — SIGNIFICANT CHANGE UP (ref 98–107)
CO2 SERPL-SCNC: 26 MMOL/L — SIGNIFICANT CHANGE UP (ref 22–31)
CREAT SERPL-MCNC: 0.9 MG/DL — SIGNIFICANT CHANGE UP (ref 0.5–1.3)
GLUCOSE SERPL-MCNC: 188 MG/DL — HIGH (ref 70–99)
HCT VFR BLD CALC: 37.7 % — LOW (ref 39–50)
HGB BLD-MCNC: 12.1 G/DL — LOW (ref 13–17)
MCHC RBC-ENTMCNC: 29.2 PG — SIGNIFICANT CHANGE UP (ref 27–34)
MCHC RBC-ENTMCNC: 32.1 % — SIGNIFICANT CHANGE UP (ref 32–36)
MCV RBC AUTO: 91.1 FL — SIGNIFICANT CHANGE UP (ref 80–100)
NRBC # FLD: 0.05 — SIGNIFICANT CHANGE UP
PLATELET # BLD AUTO: 244 K/UL — SIGNIFICANT CHANGE UP (ref 150–400)
PMV BLD: 11.5 FL — SIGNIFICANT CHANGE UP (ref 7–13)
POTASSIUM SERPL-MCNC: 3.2 MMOL/L — LOW (ref 3.5–5.3)
POTASSIUM SERPL-SCNC: 3.2 MMOL/L — LOW (ref 3.5–5.3)
RBC # BLD: 4.14 M/UL — LOW (ref 4.2–5.8)
RBC # FLD: 13.1 % — SIGNIFICANT CHANGE UP (ref 10.3–14.5)
SODIUM SERPL-SCNC: 141 MMOL/L — SIGNIFICANT CHANGE UP (ref 135–145)
VANCOMYCIN TROUGH SERPL-MCNC: 5.7 UG/ML — LOW (ref 10–20)
VANCOMYCIN TROUGH SERPL-MCNC: 7.5 UG/ML — LOW (ref 10–20)
WBC # BLD: 9.11 K/UL — SIGNIFICANT CHANGE UP (ref 3.8–10.5)
WBC # FLD AUTO: 9.11 K/UL — SIGNIFICANT CHANGE UP (ref 3.8–10.5)

## 2017-08-29 PROCEDURE — 99254 IP/OBS CNSLTJ NEW/EST MOD 60: CPT

## 2017-08-29 RX ORDER — SIMVASTATIN 20 MG/1
1 TABLET, FILM COATED ORAL
Qty: 30 | Refills: 0 | OUTPATIENT
Start: 2017-08-29 | End: 2017-09-28

## 2017-08-29 RX ORDER — AMPICILLIN SODIUM AND SULBACTAM SODIUM 250; 125 MG/ML; MG/ML
3 INJECTION, POWDER, FOR SUSPENSION INTRAMUSCULAR; INTRAVENOUS ONCE
Qty: 0 | Refills: 0 | Status: COMPLETED | OUTPATIENT
Start: 2017-08-29 | End: 2017-08-29

## 2017-08-29 RX ORDER — AMPICILLIN SODIUM AND SULBACTAM SODIUM 250; 125 MG/ML; MG/ML
3 INJECTION, POWDER, FOR SUSPENSION INTRAMUSCULAR; INTRAVENOUS EVERY 6 HOURS
Qty: 0 | Refills: 0 | Status: DISCONTINUED | OUTPATIENT
Start: 2017-08-29 | End: 2017-08-29

## 2017-08-29 RX ORDER — INSULIN LISPRO 100/ML
10 VIAL (ML) SUBCUTANEOUS
Qty: 1 | Refills: 0 | OUTPATIENT
Start: 2017-08-29 | End: 2017-09-28

## 2017-08-29 RX ORDER — IBUPROFEN 200 MG
600 TABLET ORAL EVERY 6 HOURS
Qty: 0 | Refills: 0 | Status: DISCONTINUED | OUTPATIENT
Start: 2017-08-29 | End: 2017-08-29

## 2017-08-29 RX ORDER — ENOXAPARIN SODIUM 100 MG/ML
33 INJECTION SUBCUTANEOUS
Qty: 1 | Refills: 0 | OUTPATIENT
Start: 2017-08-29 | End: 2017-09-28

## 2017-08-29 RX ORDER — OXYCODONE HYDROCHLORIDE 5 MG/1
5 TABLET ORAL EVERY 6 HOURS
Qty: 0 | Refills: 0 | Status: DISCONTINUED | OUTPATIENT
Start: 2017-08-29 | End: 2017-08-29

## 2017-08-29 RX ORDER — DOCUSATE SODIUM 100 MG
100 CAPSULE ORAL THREE TIMES A DAY
Qty: 0 | Refills: 0 | Status: DISCONTINUED | OUTPATIENT
Start: 2017-08-29 | End: 2017-08-29

## 2017-08-29 RX ORDER — SENNA PLUS 8.6 MG/1
2 TABLET ORAL AT BEDTIME
Qty: 0 | Refills: 0 | Status: DISCONTINUED | OUTPATIENT
Start: 2017-08-29 | End: 2017-08-29

## 2017-08-29 RX ORDER — AMPICILLIN SODIUM AND SULBACTAM SODIUM 250; 125 MG/ML; MG/ML
INJECTION, POWDER, FOR SUSPENSION INTRAMUSCULAR; INTRAVENOUS
Qty: 0 | Refills: 0 | Status: DISCONTINUED | OUTPATIENT
Start: 2017-08-29 | End: 2017-08-29

## 2017-08-29 RX ORDER — OXYCODONE HYDROCHLORIDE 5 MG/1
1 TABLET ORAL
Qty: 12 | Refills: 0 | OUTPATIENT
Start: 2017-08-29 | End: 2017-09-01

## 2017-08-29 RX ORDER — POTASSIUM CHLORIDE 20 MEQ
40 PACKET (EA) ORAL ONCE
Qty: 0 | Refills: 0 | Status: COMPLETED | OUTPATIENT
Start: 2017-08-29 | End: 2017-08-29

## 2017-08-29 RX ORDER — IBUPROFEN 200 MG
1 TABLET ORAL
Qty: 0 | Refills: 0 | COMMUNITY
Start: 2017-08-29

## 2017-08-29 RX ORDER — DOCUSATE SODIUM 100 MG
1 CAPSULE ORAL
Qty: 0 | Refills: 0 | COMMUNITY
Start: 2017-08-29

## 2017-08-29 RX ORDER — SIMVASTATIN 20 MG/1
20 TABLET, FILM COATED ORAL AT BEDTIME
Qty: 0 | Refills: 0 | Status: DISCONTINUED | OUTPATIENT
Start: 2017-08-29 | End: 2017-08-29

## 2017-08-29 RX ADMIN — Medication 2: at 17:18

## 2017-08-29 RX ADMIN — Medication 100 MILLIGRAM(S): at 13:08

## 2017-08-29 RX ADMIN — Medication 11 UNIT(S): at 17:18

## 2017-08-29 RX ADMIN — HEPARIN SODIUM 7500 UNIT(S): 5000 INJECTION INTRAVENOUS; SUBCUTANEOUS at 13:08

## 2017-08-29 RX ADMIN — OXYCODONE AND ACETAMINOPHEN 1 TABLET(S): 5; 325 TABLET ORAL at 13:25

## 2017-08-29 RX ADMIN — Medication 100 MILLIGRAM(S): at 05:40

## 2017-08-29 RX ADMIN — HEPARIN SODIUM 7500 UNIT(S): 5000 INJECTION INTRAVENOUS; SUBCUTANEOUS at 05:40

## 2017-08-29 RX ADMIN — OXYCODONE AND ACETAMINOPHEN 1 TABLET(S): 5; 325 TABLET ORAL at 12:53

## 2017-08-29 RX ADMIN — AMPICILLIN SODIUM AND SULBACTAM SODIUM 200 GRAM(S): 250; 125 INJECTION, POWDER, FOR SUSPENSION INTRAMUSCULAR; INTRAVENOUS at 09:38

## 2017-08-29 RX ADMIN — Medication 11 UNIT(S): at 12:52

## 2017-08-29 RX ADMIN — Medication 2: at 09:35

## 2017-08-29 RX ADMIN — Medication 11 UNIT(S): at 09:34

## 2017-08-29 RX ADMIN — Medication 40 MILLIEQUIVALENT(S): at 09:34

## 2017-08-29 NOTE — PROGRESS NOTE ADULT - SUBJECTIVE AND OBJECTIVE BOX
Morning Surgical Progress Note  Patient is a 47y old  Male who presents with a chief complaint of Pain and swelling of the left thigh, as well as fevers, chills and sweating (24 Aug 2017 12:19)    SUBJECTIVE: Patient seen and examined at bedside with surgical team, patient complains of left groin pain, patient wishes to go home. He denies fever and chills and nausea    Vital Signs Last 24 Hrs  T(C): 36.8 (29 Aug 2017 05:38), Max: 37.1 (28 Aug 2017 22:41)  T(F): 98.3 (29 Aug 2017 05:38), Max: 98.7 (28 Aug 2017 22:41)  HR: 86 (29 Aug 2017 05:38) (84 - 88)  BP: 141/87 (29 Aug 2017 05:38) (129/93 - 141/87)  BP(mean): --  RR: 18 (29 Aug 2017 05:38) (18 - 20)  SpO2: 100% (29 Aug 2017 05:38) (96% - 100%)  I&O's Detail    28 Aug 2017 07:01  -  29 Aug 2017 07:00  --------------------------------------------------------  IN:    IV PiggyBack: 550 mL  Total IN: 550 mL    OUT:  Total OUT: 0 mL    Total NET: 550 mL        MEDICATIONS  (STANDING):  insulin lispro (HumaLOG) corrective regimen sliding scale   SubCutaneous three times a day before meals  insulin lispro (HumaLOG) corrective regimen sliding scale   SubCutaneous at bedtime  heparin  Injectable 7500 Unit(s) SubCutaneous every 8 hours  insulin lispro Injectable (HumaLOG) 11 Unit(s) SubCutaneous three times a day before meals  insulin glargine Injectable (LANTUS) 33 Unit(s) SubCutaneous at bedtime  vancomycin  IVPB 1250 milliGRAM(s) IV Intermittent every 12 hours  docusate sodium 100 milliGRAM(s) Oral three times a day  senna 2 Tablet(s) Oral at bedtime  simvastatin 20 milliGRAM(s) Oral at bedtime  potassium chloride    Tablet ER 40 milliEquivalent(s) Oral once    MEDICATIONS  (PRN):  acetaminophen   Tablet. 650 milliGRAM(s) Oral every 6 hours PRN Mild Pain (1 - 3)  oxyCODONE    5 mG/acetaminophen 325 mG 1 Tablet(s) Oral every 4 hours PRN Moderate Pain (4 - 6)  ondansetron Injectable 4 milliGRAM(s) IV Push once PRN Nausea and/or Vomiting      Physical Exam  General: A&Ox3, NAD  Abdominal: obese, left groin + penrose, mild serosanguinous drainage on dressing, + induration, tender    LABS:                        12.1   9.11  )-----------( 244      ( 29 Aug 2017 05:37 )             37.7     08-29    141  |  100  |  7   ----------------------------<  188<H>  3.2<L>   |  26  |  0.90    Ca    9.1      29 Aug 2017 05:37

## 2017-08-29 NOTE — CONSULT NOTE ADULT - PROBLEM SELECTOR PROBLEM 2
Hypercholesteremia
Streptococcus anginosus or Streptococcus dysgalactiae subspecies equisimilis identified by diagnostic testing

## 2017-08-29 NOTE — CONSULT NOTE ADULT - SUBJECTIVE AND OBJECTIVE BOX
JACKSON LAL 47y Male  MRN-[id number]    Patient is a 47y old  Male who presents with a chief complaint of Pain and swelling of the left thigh, as well as fevers, chills and sweating (24 Aug 2017 12:19)      HPI:  47 year old male, with past history significant for Abscesses, DM, HLD, R-Knee arthroscopy, presented to the ED secondary to left pain and swelling of the left thigh.  Seen and evaluated at bedside; NAD, but appears to be experiencing some discomfort from left groin pain.  Patient reports that on Sunday, he noted a swelling (just bigger than a quarter) at the left upper groin area.  Patient thought that it was the usual type of boil and expected it to mature to the point of him being able to puncture it an expel the pus, as he has done with similar swellings in the past; the last being during 2016.  However, on Monday patient went to work and felt increasing discomfort; upon checking, he realized that the swelling had approximately quadrupled in size, was extremely painful (to the extent of limiting normal gait), and he began to have subjective fevers.  Patient called in to work on Tuesday because of worsening condition; pain, fevers, chills, sweating, headaches, body aches.  Unable to identify any inciting factor, but surmises that it could have started from a snagged hair.  No prior admissions for same.    Reports being diagnosed with Type-2 Diabetes mellitus one week ago, but has not started on a medication since he was called since coming to the ED.  Noted increased thirst and polyuria for some time.  Was started on an anti-lipid medication last week and has taken 3 tablets thus far.  Has had intentional weight loss of approximately 34 pounds since April 2017 (previously weight 336 pounds, and now weighs 302 pounds).    Vital signs upon ED presentation as follows: BP = 134/74 (to low of 95/54), HR = 110, RR = 20, T = 38.3 C (100.9 F), O2 Sat = 99% on RA.  CT scan of pelvis significant for "Moderate soft tissue swelling and subcutaneous inflammatory change in the left groin likely representing a cellulitis. No associated focal fluid collection to suggest an abscess. No associated subcutaneous gas."  Diagnosed with Cellulitis.  Prescribed zosyn 3.375 grams, vancomycin 1 gram and NaCl 2 liters bolus in the ED. (24 Aug 2017 00:53)    S/p debridement 8/24  OR culture grew Strep anginosus      PAST MEDICAL & SURGICAL HISTORY:  Hypercholesteremia  Diabetes mellitus: ~ diagnosed during week of 08/13 - 19/2017  Abscess  H/O arthroscopy of right knee      Allergies    No Known Allergies    Intolerances    pork (Other)      ANTIMICROBIALS:  ampicillin/sulbactam  IVPB    ampicillin/sulbactam  IVPB 3 every 6 hours      MEDICATIONS  (STANDING):  insulin lispro (HumaLOG) corrective regimen sliding scale   SubCutaneous three times a day before meals  insulin lispro (HumaLOG) corrective regimen sliding scale   SubCutaneous at bedtime  heparin  Injectable 7500 Unit(s) SubCutaneous every 8 hours  insulin lispro Injectable (HumaLOG) 11 Unit(s) SubCutaneous three times a day before meals  insulin glargine Injectable (LANTUS) 33 Unit(s) SubCutaneous at bedtime  docusate sodium 100 milliGRAM(s) Oral three times a day  senna 2 Tablet(s) Oral at bedtime  simvastatin 20 milliGRAM(s) Oral at bedtime  ampicillin/sulbactam  IVPB   IV Intermittent   ampicillin/sulbactam  IVPB 3 Gram(s) IV Intermittent every 6 hours      Social History  Smoking:  Etoh:  Drug use:      FAMILY HISTORY:  Family history of heart failure (Father): father  Family history of breast cancer (Aunt, Aunt)  Family history of hypertension (Mother, Grandparent): grandmother, mother  Family history of cerebrovascular accident (CVA) (Grandparent): grandmother  Family history of diabetes mellitus (Mother, Sibling, Father): parents, sister      Vital Signs Last 24 Hrs  T(C): 36.8 (29 Aug 2017 05:38), Max: 37.1 (28 Aug 2017 22:41)  T(F): 98.3 (29 Aug 2017 05:38), Max: 98.7 (28 Aug 2017 22:41)  HR: 86 (29 Aug 2017 05:38) (84 - 88)  BP: 141/87 (29 Aug 2017 05:38) (129/93 - 141/87)  BP(mean): --  RR: 18 (29 Aug 2017 05:38) (18 - 20)  SpO2: 100% (29 Aug 2017 05:38) (96% - 100%)    CBC Full  -  ( 29 Aug 2017 05:37 )  WBC Count : 9.11 K/uL  Hemoglobin : 12.1 g/dL  Hematocrit : 37.7 %  Platelet Count - Automated : 244 K/uL  Mean Cell Volume : 91.1 fL  Mean Cell Hemoglobin : 29.2 pg  Mean Cell Hemoglobin Concentration : 32.1 %  Auto Neutrophil # : x  Auto Lymphocyte # : x  Auto Monocyte # : x  Auto Eosinophil # : x  Auto Basophil # : x  Auto Neutrophil % : x  Auto Lymphocyte % : x  Auto Monocyte % : x  Auto Eosinophil % : x  Auto Basophil % : x    08-29    141  |  100  |  7   ----------------------------<  188<H>  3.2<L>   |  26  |  0.90    Ca    9.1      29 Aug 2017 05:37            MICROBIOLOGY:    Culture - Surg Site Aerob/Anaer w/Gm St (08.25.17 @ 22:10)    -  Ceftriaxone: S <=1 BERTO    -  Clindamycin: S 0.25 BERTO    Gram Stain Wound:   WBC White Blood Cells  QNTY CELLS IN GRAM STAIN: FEW (2+)  GVR^GRAM VARIABLE RODS  GPCPR^Gram Pos Cocci in Pairs  QUANTITY OF BACTERIA SEEN: FEW (2+)    -  Erythromycin: S <=0.12 BERTO    -  Penicillin G: S 0.06 BERTO    -  Vancomycin: S 0.5 BERTO    Culture - Surgical Site:   LISA^Streptococcus anginosus    Specimen Source: OTHER    Organism Identification: Streptococcus anginosus    Organism: Streptococcus anginosus    Method Type: MANUAL BERTO    Culture - Surg Site Aerob/Anaer w/Gm St (08.25.17 @ 22:06)    -  Ceftriaxone: S <=1 BERTO    -  Penicillin G: S 0.12 BERTO    -  Erythromycin: S 0.25 BERTO    -  Vancomycin: S 0.5 BERTO    Gram Stain Wound:   WBC White Blood Cells  QNTY CELLS IN GRAM STAIN: FEW (2+)  GPCPR^Gram Pos Cocci in Pairs  QUANTITY OF BACTERIA SEEN: FEW (2+)    -  Clindamycin: S 0.25 BERTO    Culture - Surgical Site:   LISA^Streptococcus anginosus    Specimen Source: OTHER    Organism Identification: Streptococcus anginosus    Organism: Streptococcus anginosus    Method Type: MANUAL BERTO    Culture - Blood (08.23.17 @ 23:04)    Culture - Blood:   NO ORGANISMS ISOLATED    Specimen Source: BLOOD PERIPHERAL    Culture - Blood (08.23.17 @ 23:04)    Culture - Blood:   NO ORGANISMS ISOLATED    Specimen Source: BLOOD VENOUS          Vancomycin Level, Trough: 5.7 ug/mL (08-29-17 @ 05:37)  Vancomycin Level, Trough: 7.5 ug/mL (08-28-17 @ 23:23)  v            RADIOLOGY    CT Pelvis w/ IV Cont (08.25.17 @ 16:23)   IMPRESSION:   Findings in the left perineum are concerning for necrotizing fasciitis,   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 JACKSON LAL 47y Male  MRN-[id number]    Patient is a 47y old  Male who presents with a chief complaint of Pain and swelling of the left thigh, as well as fevers, chills and sweating (24 Aug 2017 12:19)      HPI:  47 year old male, with past history significant for Abscesses, DM, HLD, R-Knee arthroscopy, presented to the ED secondary to left pain and swelling of the left thigh.  Seen and evaluated at bedside; NAD, but appears to be experiencing some discomfort from left groin pain.  Patient reports that on Sunday, he noted a swelling (just bigger than a quarter) at the left upper groin area.  Patient thought that it was the usual type of boil and expected it to mature to the point of him being able to puncture it an expel the pus, as he has done with similar swellings in the past; the last being during 2016.  However, on Monday patient went to work and felt increasing discomfort; upon checking, he realized that the swelling had approximately quadrupled in size, was extremely painful (to the extent of limiting normal gait), and he began to have subjective fevers.  Patient called in to work on Tuesday because of worsening condition; pain, fevers, chills, sweating, headaches, body aches.  Unable to identify any inciting factor, but surmises that it could have started from a snagged hair.  No prior admissions for same.    Reports being diagnosed with Type-2 Diabetes mellitus one week ago, but has not started on a medication since he was called since coming to the ED.  Noted increased thirst and polyuria for some time.  Was started on an anti-lipid medication last week and has taken 3 tablets thus far.  Has had intentional weight loss of approximately 34 pounds since April 2017 (previously weight 336 pounds, and now weighs 302 pounds).    Vital signs upon ED presentation as follows: BP = 134/74 (to low of 95/54), HR = 110, RR = 20, T = 38.3 C (100.9 F), O2 Sat = 99% on RA.  CT scan of pelvis significant for "Moderate soft tissue swelling and subcutaneous inflammatory change in the left groin likely representing a cellulitis. No associated focal fluid collection to suggest an abscess. No associated subcutaneous gas."  Diagnosed with Cellulitis.  Prescribed zosyn 3.375 grams, vancomycin 1 gram and NaCl 2 liters bolus in the ED. (24 Aug 2017 00:53)    S/p debridement 8/24  OR culture grew Strep anginosus    feels better      PAST MEDICAL & SURGICAL HISTORY:  Hypercholesteremia  Diabetes mellitus: ~ diagnosed during week of 08/13 - 19/2017  Abscess  H/O arthroscopy of right knee      Allergies    No Known Allergies    Intolerances    pork (Other)      ANTIMICROBIALS:  ampicillin/sulbactam  IVPB    ampicillin/sulbactam  IVPB 3 every 6 hours      MEDICATIONS  (STANDING):  insulin lispro (HumaLOG) corrective regimen sliding scale   SubCutaneous three times a day before meals  insulin lispro (HumaLOG) corrective regimen sliding scale   SubCutaneous at bedtime  heparin  Injectable 7500 Unit(s) SubCutaneous every 8 hours  insulin lispro Injectable (HumaLOG) 11 Unit(s) SubCutaneous three times a day before meals  insulin glargine Injectable (LANTUS) 33 Unit(s) SubCutaneous at bedtime  docusate sodium 100 milliGRAM(s) Oral three times a day  senna 2 Tablet(s) Oral at bedtime  simvastatin 20 milliGRAM(s) Oral at bedtime  ampicillin/sulbactam  IVPB   IV Intermittent   ampicillin/sulbactam  IVPB 3 Gram(s) IV Intermittent every 6 hours      Social History  Smoking:  Etoh:  Drug use:      FAMILY HISTORY:  Family history of heart failure (Father): father  Family history of breast cancer (Aunt, Aunt)  Family history of hypertension (Mother, Grandparent): grandmother, mother  Family history of cerebrovascular accident (CVA) (Grandparent): grandmother  Family history of diabetes mellitus (Mother, Sibling, Father): parents, sister      Vital Signs Last 24 Hrs  T(C): 36.8 (29 Aug 2017 05:38), Max: 37.1 (28 Aug 2017 22:41)  T(F): 98.3 (29 Aug 2017 05:38), Max: 98.7 (28 Aug 2017 22:41)  HR: 86 (29 Aug 2017 05:38) (84 - 88)  BP: 141/87 (29 Aug 2017 05:38) (129/93 - 141/87)  BP(mean): --  RR: 18 (29 Aug 2017 05:38) (18 - 20)  SpO2: 100% (29 Aug 2017 05:38) (96% - 100%)    CBC Full  -  ( 29 Aug 2017 05:37 )  WBC Count : 9.11 K/uL  Hemoglobin : 12.1 g/dL  Hematocrit : 37.7 %  Platelet Count - Automated : 244 K/uL  Mean Cell Volume : 91.1 fL  Mean Cell Hemoglobin : 29.2 pg  Mean Cell Hemoglobin Concentration : 32.1 %  Auto Neutrophil # : x  Auto Lymphocyte # : x  Auto Monocyte # : x  Auto Eosinophil # : x  Auto Basophil # : x  Auto Neutrophil % : x  Auto Lymphocyte % : x  Auto Monocyte % : x  Auto Eosinophil % : x  Auto Basophil % : x    08-29    141  |  100  |  7   ----------------------------<  188<H>  3.2<L>   |  26  |  0.90    Ca    9.1      29 Aug 2017 05:37            MICROBIOLOGY:    Culture - Surg Site Aerob/Anaer w/Gm St (08.25.17 @ 22:10)    -  Ceftriaxone: S <=1 BERTO    -  Clindamycin: S 0.25 BERTO    Gram Stain Wound:   WBC White Blood Cells  QNTY CELLS IN GRAM STAIN: FEW (2+)  GVR^GRAM VARIABLE RODS  GPCPR^Gram Pos Cocci in Pairs  QUANTITY OF BACTERIA SEEN: FEW (2+)    -  Erythromycin: S <=0.12 BERTO    -  Penicillin G: S 0.06 BERTO    -  Vancomycin: S 0.5 BERTO    Culture - Surgical Site:   LISA^Streptococcus anginosus    Specimen Source: OTHER    Organism Identification: Streptococcus anginosus    Organism: Streptococcus anginosus    Method Type: MANUAL BERTO    Culture - Surg Site Aerob/Anaer w/Gm St (08.25.17 @ 22:06)    -  Ceftriaxone: S <=1 BERTO    -  Penicillin G: S 0.12 BERTO    -  Erythromycin: S 0.25 BERTO    -  Vancomycin: S 0.5 BERTO    Gram Stain Wound:   WBC White Blood Cells  QNTY CELLS IN GRAM STAIN: FEW (2+)  GPCPR^Gram Pos Cocci in Pairs  QUANTITY OF BACTERIA SEEN: FEW (2+)    -  Clindamycin: S 0.25 BERTO    Culture - Surgical Site:   LISA^Streptococcus anginosus    Specimen Source: OTHER    Organism Identification: Streptococcus anginosus    Organism: Streptococcus anginosus    Method Type: MANUAL BERTO    Culture - Blood (08.23.17 @ 23:04)    Culture - Blood:   NO ORGANISMS ISOLATED    Specimen Source: BLOOD PERIPHERAL    Culture - Blood (08.23.17 @ 23:04)    Culture - Blood:   NO ORGANISMS ISOLATED    Specimen Source: BLOOD VENOUS          Vancomycin Level, Trough: 5.7 ug/mL (08-29-17 @ 05:37)  Vancomycin Level, Trough: 7.5 ug/mL (08-28-17 @ 23:23)  v            RADIOLOGY    CT Pelvis w/ IV Cont (08.25.17 @ 16:23)   IMPRESSION:   Findings in the left perineum are concerning for necrotizing fasciitis,   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

## 2017-08-29 NOTE — PROGRESS NOTE ADULT - ASSESSMENT
47yM w/ L groin abscess s/p I&D with penrose drain placement  - change antibiotics to unasyn  - c/w Penrose drainage  - c/w daily dressing changes  - c/w diabetes regime as per endocrine

## 2017-08-29 NOTE — CONSULT NOTE ADULT - PROBLEM SELECTOR PROBLEM 1
Type 2 diabetes mellitus with other skin complication, without long-term current use of insulin
Sepsis

## 2017-08-29 NOTE — CONSULT NOTE ADULT - PROBLEM SELECTOR RECOMMENDATION 2
agree with simvastatin 20
-better  -cont unasyn 3 gm iv q6  -if DC, augmentin 875 mg po BID x 7 days

## 2017-08-29 NOTE — CONSULT NOTE ADULT - ASSESSMENT
46yo M with concern for L groin abscess vs. extensive soft tissue infection.  - f/u CT to evaluate for abscess  - NPO/IVF  -Abx  - D/w Dr. Adams
47M newly diagnosed uncontrolled DM2 HbA1c 15.3% a/w skin infection.
47M with new DM with Lt thigh strep anginosus abscess, S/P I+D presenting with fever/leukocytosis/sepsis initially

## 2017-08-29 NOTE — CONSULT NOTE ADULT - PROBLEM SELECTOR RECOMMENDATION 9
While inpatient start Lantus 25u qhs and Humalog 8/8/8  Humalog low scale qac and low bedtime scale.  DM education provided to patient however he is resistant to consider using insulin.  Explained that basal bolus insulin is recommended but that basal plus orals would be alternative acceptable option if he is refusing 4 injections per day.  Would try to educate wife to see if she can help with injections.  He will need outpatient endocrine follow up [phone].  RD consult
-due to thigh abscess  -better  -local care per surgery  -DM control

## 2017-08-29 NOTE — CONSULT NOTE ADULT - ATTENDING COMMENTS
Petros Sena  Attending Physician   Division of Infectious Disease  Pager #[phone]  After 5pm/weekend #[phone]
I have reviewed the history, pertinent labs and imaging, and discussed the care with the consult resident.    The active issues are:  1. necrotizing soft tissue infection in a poorly controlled diabetic with hyperglycemia    Based on my examination and review of the CT scan, I have recommended to the patient urgent surgical drainage, possible debridement.  He understands the risks of bleeding, scar and need for further interventions.  He agrees to proceed with surgery.

## 2017-08-29 NOTE — PROGRESS NOTE ADULT - ATTENDING COMMENTS
August 29th, 2017  1:50 PM    Hospital Day #5  Post op Day #4    This patient was seen and evaluated on daily B-Team rounds. Care was discussed at B-Team morning report sign-out. Adjacent PA note reviewed and ID input apprectiated.    BP		=	126 – 135/78 - 93  P		=	83 - 88		O2 Sat	=	96% - 100%  R		=	18 - 20		I/O	=	550 in/ - out  Temp		=	36.4 – 37.1			+ 0.4 l since admission (not counting OR)    Glucose	=	137 – 269    Weight		=	132.4 Kg  BMI		=	43        WBC	=	9	Na		=	141  Neutro	=	-	K		=	3.2  Hgb	=	12	Cl		=	100  Hct	=	38%	HCO3		=	26  Plts	=	244	Glucose	=	188  			BUN		=	7  PT	=	-	Creat		=	0.9  PTT	=	-  INR	=	-	Vancomycin	=	6    Other 8/25	-	Strep anginosus that was sensitive to all antibiotics tested  Other 8/25	-	Strep anginosus that was sensitive to all antibiotics tested  Blood 8/23	-	NTD  Blood 8/23	-	NTD    Remains on:    1)	Low carbohydrate diet.  2)	Heparin 7,500 units sq q8 hours  3)	Unasyn 3 grams IV q6 hours – Day #0 (after 6 days of vancomycin and Zosyn)  4)	Zocor 20 mg po qhs  5)	Insulin  	a)	Lantus 33 units sq qhs  	b)	Humalog 11 units sq tid before meals  	c)	Sliding scale  6)	Percocet 1 tab po q4 hours prn  7)	Tylenol 650 po q6 hours prn  8)	Colace 100 mg po q8 hours / Senna 2 tab po qhs    Assessment:	This patient is assessed as being a morbidly obese, recently diagnosed type 2 diabetic male who has hyperlipidemia who presented to the ED at [facility] at approximately 5:30 PM on 8/23/17 with complaints of having left groin pain. He had fever, tachycardia, hypotension, leukocytosis, and hyperglycemia. He had left groin cellulitis and subsequently was diagnosed as having multiple contiguous subcutaneous abscesses. He underwent incision and drainage, irrigation and debridement of the left groin on 8/25/17 and was found to have a sensitive Strep anginosus in the abscess. He was treated with zosyn and vancomycin and earlier today was changed to Unasyn. He is afebrile without a leukocytosis and his glucose control has improved.    Plan to:    1) August 29th, 2017  1:50 PM    Hospital Day #5  Post op Day #4    This patient was seen and evaluated on daily B-Team rounds. Care was discussed at B-Team morning report sign-out. Adjacent PA note reviewed and ID input appreciated.    BP		=	126 – 135/78 - 93  P		=	83 - 88		O2 Sat	=	96% - 100%  R		=	18 - 20		I/O	=	550 in/ - out  Temp		=	36.4 – 37.1			+ 0.4 l since admission (not counting OR)    Glucose	=	137 – 269    Weight		=	132.4 Kg  BMI		=	43        WBC	=	9	Na		=	141	Hemoglobin A1-C	=	15  Neutro	=	-	K		=	3.2  Hgb	=	12	Cl		=	100  Hct	=	38%	HCO3		=	26  Plts	=	244	Glucose	=	188  			BUN		=	7  PT	=	-	Creat		=	0.9  PTT	=	-  INR	=	-	Vancomycin	=	6    Other 8/25	-	Strep anginosus that was sensitive to all antibiotics tested  Other 8/25	-	Strep anginosus that was sensitive to all antibiotics tested  Blood 8/23	-	NTD  Blood 8/23	-	NTD    Remains on:    1)	Low carbohydrate diet.  2)	Heparin 7,500 units sq q8 hours  3)	Unasyn 3 grams IV q6 hours – Day #0 (after 6 days of vancomycin and Zosyn)  4)	Zocor 20 mg po qhs  5)	Insulin  	a)	Lantus 33 units sq qhs  	b)	Humalog 11 units sq tid before meals  	c)	Sliding scale  6)	Percocet 1 tab po q4 hours prn  7)	Tylenol 650 po q6 hours prn  8)	Colace 100 mg po q8 hours / Senna 2 tab po qhs    Assessment:	This patient is assessed as being a morbidly obese, recently diagnosed type 2 diabetic male who has hyperlipidemia who presented to the ED at [facility] at approximately 5:30 PM on 8/23/17 with complaints of having left groin pain. He had fever, tachycardia, hypotension, leukocytosis, and hyperglycemia. He had left groin cellulitis and subsequently was diagnosed as having multiple contiguous subcutaneous abscesses. He underwent incision and drainage, irrigation and debridement of the left groin on 8/25/17 and was found to have a sensitive Strep anginosus in the abscess. He was treated with zosyn and vancomycin and earlier today was changed to Unasyn. He is afebrile without a leukocytosis and his glucose control has improved.    Plan to:    1) August 29th, 2017  1:50 PM    Hospital Day #5  Post op Day #4    This patient was seen and evaluated on daily B-Team rounds. Care was discussed at B-Team morning report sign-out. Adjacent PA note reviewed and ID input appreciated.    BP		=	126 – 135/78 - 93  P		=	83 - 88		O2 Sat	=	96% - 100%  R		=	18 - 20		I/O	=	550 in/ - out  Temp		=	36.4 – 37.1			+ 0.4 l since admission (not counting OR)    Glucose	=	137 – 269    Weight		=	132.4 Kg  BMI		=	43    Awake, alert, and fully oriented. In no distress. Non-toxic.  Anicteric. Pupils reactive and extra-occular movements preserved.  No thrush. Normal oropharyngeal mucosa  No JVD.  Lungs clear with non-labored respirations. Symmetrical chest wall movements.  COR - RRR. No murmur.  Abdomen obese. Soft. Left groin with some induration. Penrose drains in place. No undue warmth or purulent drainage.  Extremities well perfused.    WBC	=	9	Na		=	141	Hemoglobin A1-C	=	15  Neutro	=	-	K		=	3.2  Hgb	=	12	Cl		=	100  Hct	=	38%	HCO3		=	26  Plts	=	244	Glucose	=	188  			BUN		=	7  PT	=	-	Creat		=	0.9  PTT	=	-  INR	=	-	Vancomycin	=	6    Other 8/25	-	Strep anginosus that was sensitive to all antibiotics tested  Other 8/25	-	Strep anginosus that was sensitive to all antibiotics tested  Blood 8/23	-	NTD  Blood 8/23	-	NTD    Remains on:    1)	Low carbohydrate diet.  2)	Heparin 7,500 units sq q8 hours  3)	Unasyn 3 grams IV q6 hours – Day #0 (after 6 days of vancomycin and Zosyn)  4)	Zocor 20 mg po qhs  5)	Insulin  	a)	Lantus 33 units sq qhs  	b)	Humalog 11 units sq tid before meals  	c)	Sliding scale  6)	Percocet 1 tab po q4 hours prn  7)	Tylenol 650 po q6 hours prn  8)	Colace 100 mg po q8 hours / Senna 2 tab po qhs    Assessment:	This patient is assessed as being a morbidly obese, recently diagnosed type 2 diabetic male who has hyperlipidemia who presented to the ED at [facility] at approximately 5:30 PM on 8/23/17 with complaints of having left groin pain. He had fever, tachycardia, hypotension, leukocytosis, and hyperglycemia. He had left groin cellulitis and subsequently was diagnosed as having multiple contiguous subcutaneous abscesses. He underwent incision and drainage, irrigation and debridement of the left groin on 8/25/17 and was found to have a sensitive Strep anginosus in the abscess. He was treated with zosyn and vancomycin and earlier today was changed to Unasyn. He is afebrile without a leukocytosis and his glucose control has improved.    Plan to:    1)	Change the Unasyn to Augmentin. Will give Augmentin for 1 week.  2)	Treat pain with Motrin and if needed Oxycodone.  3)	May shower.  4)	Treat how to administer insulin.  5)	Clarify other medication orders.  6)	Plan discharge to home. May benefit from visiting nurse at home. May  	resume full activities.  7)	Follow-up with Dr. Adams. Patient will be provided her office number to  	make an appointment. Will need the Penrose drains to be removed.  	Will plan removal in the office (to provide drainage and to ensure  	follow-up).  8)	Full support in place    Eldon Irizarry  20 Minutes exclusive of procedures

## 2017-08-30 PROBLEM — L02.91 CUTANEOUS ABSCESS, UNSPECIFIED: Chronic | Status: ACTIVE | Noted: 2017-08-23

## 2017-08-30 PROBLEM — E78.00 PURE HYPERCHOLESTEROLEMIA, UNSPECIFIED: Chronic | Status: ACTIVE | Noted: 2017-08-23

## 2017-08-30 PROBLEM — E11.9 TYPE 2 DIABETES MELLITUS WITHOUT COMPLICATIONS: Chronic | Status: ACTIVE | Noted: 2017-08-23

## 2017-09-05 PROBLEM — Z00.00 ENCOUNTER FOR PREVENTIVE HEALTH EXAMINATION: Status: ACTIVE | Noted: 2017-09-05

## 2017-09-06 ENCOUNTER — APPOINTMENT (OUTPATIENT)
Dept: TRAUMA SURGERY | Facility: CLINIC | Age: 47
End: 2017-09-06
Payer: COMMERCIAL

## 2017-09-06 ENCOUNTER — TRANSCRIPTION ENCOUNTER (OUTPATIENT)
Age: 47
End: 2017-09-06

## 2017-09-06 VITALS
TEMPERATURE: 98 F | HEART RATE: 80 BPM | SYSTOLIC BLOOD PRESSURE: 125 MMHG | WEIGHT: 291 LBS | HEIGHT: 69 IN | DIASTOLIC BLOOD PRESSURE: 83 MMHG | BODY MASS INDEX: 43.1 KG/M2

## 2017-09-06 PROCEDURE — 99024 POSTOP FOLLOW-UP VISIT: CPT

## 2017-09-13 ENCOUNTER — APPOINTMENT (OUTPATIENT)
Dept: TRAUMA SURGERY | Facility: CLINIC | Age: 47
End: 2017-09-13
Payer: COMMERCIAL

## 2017-09-13 VITALS
HEIGHT: 69 IN | HEART RATE: 86 BPM | BODY MASS INDEX: 44.43 KG/M2 | WEIGHT: 300 LBS | SYSTOLIC BLOOD PRESSURE: 136 MMHG | TEMPERATURE: 98.5 F | DIASTOLIC BLOOD PRESSURE: 88 MMHG

## 2017-09-13 PROCEDURE — 99212 OFFICE O/P EST SF 10 MIN: CPT

## 2017-12-12 NOTE — DISCHARGE NOTE ADULT - ADMISSION DATE +STARTOFVISITDATE
Pt. resides in one level house w/ wife w/ 1 NIRAV. Steps to basement. Pt. ambulates w/ a RW and cane independently at home. Statement Selected

## 2018-07-04 ENCOUNTER — INPATIENT (INPATIENT)
Facility: HOSPITAL | Age: 48
LOS: 10 days | Discharge: STILL ADMITTED OR ADMINISTRATIVE | End: 2018-07-15
Attending: INTERNAL MEDICINE | Admitting: INTERNAL MEDICINE
Payer: COMMERCIAL

## 2018-07-04 VITALS
SYSTOLIC BLOOD PRESSURE: 149 MMHG | RESPIRATION RATE: 16 BRPM | HEART RATE: 90 BPM | OXYGEN SATURATION: 99 % | DIASTOLIC BLOOD PRESSURE: 103 MMHG | TEMPERATURE: 98 F

## 2018-07-04 DIAGNOSIS — Z98.890 OTHER SPECIFIED POSTPROCEDURAL STATES: Chronic | ICD-10-CM

## 2018-07-04 LAB
BASOPHILS # BLD AUTO: 0.04 K/UL — SIGNIFICANT CHANGE UP (ref 0–0.2)
BASOPHILS NFR BLD AUTO: 0.5 % — SIGNIFICANT CHANGE UP (ref 0–2)
EOSINOPHIL # BLD AUTO: 0.04 K/UL — SIGNIFICANT CHANGE UP (ref 0–0.5)
EOSINOPHIL NFR BLD AUTO: 0.5 % — SIGNIFICANT CHANGE UP (ref 0–6)
HCT VFR BLD CALC: 43.3 % — SIGNIFICANT CHANGE UP (ref 39–50)
HGB BLD-MCNC: 14 G/DL — SIGNIFICANT CHANGE UP (ref 13–17)
IMM GRANULOCYTES # BLD AUTO: 0.02 # — SIGNIFICANT CHANGE UP
IMM GRANULOCYTES NFR BLD AUTO: 0.2 % — SIGNIFICANT CHANGE UP (ref 0–1.5)
LYMPHOCYTES # BLD AUTO: 2.44 K/UL — SIGNIFICANT CHANGE UP (ref 1–3.3)
LYMPHOCYTES # BLD AUTO: 28.7 % — SIGNIFICANT CHANGE UP (ref 13–44)
MCHC RBC-ENTMCNC: 29.5 PG — SIGNIFICANT CHANGE UP (ref 27–34)
MCHC RBC-ENTMCNC: 32.3 % — SIGNIFICANT CHANGE UP (ref 32–36)
MCV RBC AUTO: 91.4 FL — SIGNIFICANT CHANGE UP (ref 80–100)
MONOCYTES # BLD AUTO: 0.64 K/UL — SIGNIFICANT CHANGE UP (ref 0–0.9)
MONOCYTES NFR BLD AUTO: 7.5 % — SIGNIFICANT CHANGE UP (ref 2–14)
NEUTROPHILS # BLD AUTO: 5.31 K/UL — SIGNIFICANT CHANGE UP (ref 1.8–7.4)
NEUTROPHILS NFR BLD AUTO: 62.6 % — SIGNIFICANT CHANGE UP (ref 43–77)
NRBC # FLD: 0 — SIGNIFICANT CHANGE UP
PLATELET # BLD AUTO: 201 K/UL — SIGNIFICANT CHANGE UP (ref 150–400)
PMV BLD: 11.3 FL — SIGNIFICANT CHANGE UP (ref 7–13)
RBC # BLD: 4.74 M/UL — SIGNIFICANT CHANGE UP (ref 4.2–5.8)
RBC # FLD: 12.3 % — SIGNIFICANT CHANGE UP (ref 10.3–14.5)
WBC # BLD: 8.49 K/UL — SIGNIFICANT CHANGE UP (ref 3.8–10.5)
WBC # FLD AUTO: 8.49 K/UL — SIGNIFICANT CHANGE UP (ref 3.8–10.5)

## 2018-07-04 RX ORDER — LIDOCAINE 4 G/100G
1 CREAM TOPICAL ONCE
Qty: 0 | Refills: 0 | Status: COMPLETED | OUTPATIENT
Start: 2018-07-04 | End: 2018-07-04

## 2018-07-04 RX ORDER — OXYCODONE AND ACETAMINOPHEN 5; 325 MG/1; MG/1
1 TABLET ORAL ONCE
Qty: 0 | Refills: 0 | Status: DISCONTINUED | OUTPATIENT
Start: 2018-07-04 | End: 2018-07-04

## 2018-07-04 RX ORDER — MORPHINE SULFATE 50 MG/1
4 CAPSULE, EXTENDED RELEASE ORAL ONCE
Qty: 0 | Refills: 0 | Status: DISCONTINUED | OUTPATIENT
Start: 2018-07-04 | End: 2018-07-04

## 2018-07-04 RX ORDER — KETOROLAC TROMETHAMINE 30 MG/ML
30 SYRINGE (ML) INJECTION ONCE
Qty: 0 | Refills: 0 | Status: DISCONTINUED | OUTPATIENT
Start: 2018-07-04 | End: 2018-07-04

## 2018-07-04 RX ADMIN — Medication 30 MILLIGRAM(S): at 23:00

## 2018-07-04 RX ADMIN — OXYCODONE AND ACETAMINOPHEN 1 TABLET(S): 5; 325 TABLET ORAL at 23:00

## 2018-07-04 RX ADMIN — LIDOCAINE 1 PATCH: 4 CREAM TOPICAL at 19:54

## 2018-07-04 RX ADMIN — OXYCODONE AND ACETAMINOPHEN 1 TABLET(S): 5; 325 TABLET ORAL at 20:12

## 2018-07-04 RX ADMIN — MORPHINE SULFATE 4 MILLIGRAM(S): 50 CAPSULE, EXTENDED RELEASE ORAL at 23:45

## 2018-07-04 RX ADMIN — Medication 30 MILLIGRAM(S): at 19:54

## 2018-07-04 RX ADMIN — OXYCODONE AND ACETAMINOPHEN 1 TABLET(S): 5; 325 TABLET ORAL at 19:54

## 2018-07-04 RX ADMIN — MORPHINE SULFATE 4 MILLIGRAM(S): 50 CAPSULE, EXTENDED RELEASE ORAL at 23:15

## 2018-07-04 NOTE — ED PROVIDER NOTE - MEDICAL DECISION MAKING DETAILS
47 yo M, with PMH of DM, HLD, Disc herniation of L4,L5/S1 BIBEMS to ER c/o lower back pain radiating to right leg with numbness since yesterday.   -47 yo obese M with PMH of chronic lower back pain w/ herniated disc presents w/ worsening of lower back pain radiating to RLE with numbness, difficulty sitting and standing, atraumatic, no foot drop, no saddle anesthesia, no urinary/fecal incontinence, likely disc herniation, sciatica. Plan: pain control, valium, lidoderm patch, and reassess.

## 2018-07-04 NOTE — ED PROVIDER NOTE - ATTENDING CONTRIBUTION TO CARE
mehdi: chronic (more than 10 years) hx lower back pain. since jan 2018 there is a persistent pain to rt lower back with radiation of pain and numbness to the lateral portion of the leg and thigh. today pain increased and pt came to ED. no other new sx.   pt is in pain management and has been evaluated by opt neurology. Unclear if he has been seen by neurosurgery as opt.  Pt brings copy of MRI report  LS 5/23/18 : large central disc herniations l3/l4 with resultant severe central canal stenosis at l4/5 and moderate at l3/4. mild rt paracentral disc herniation l5/s1 which contacts rt s1 nerve root. EMG report from 3/29/18=chronic denervation rt bilateral l5 nerve roots.   exam: painful rt srt leg raise at approx 20-30 degrees. decreased (but intact) sensation to touch rt lateral leg (pt states chronic). bilateral patella reflect diminished. bilateral achilles reflex 1/2 bilateral.   impression: chronic disc disease/severe lumbar stenosis/radiculopathy. no evidence of a neurologic emergency at this time.  recc: pain management so pain is more bearable; dc to opt f/u.

## 2018-07-04 NOTE — ED PROVIDER NOTE - OBJECTIVE STATEMENT
49 yo M, with PMH of DM, HLD, Disc herniation of L4,L5/S1 BIBEMS to ER c/o lower back pain radiating to right leg with numbness since yesterday. 49 yo M, with PMH of DM, HLD, Disc herniation of L4,L5/S1 BIBEMS to ER c/o lower back pain radiating to right leg with numbness since yesterday. States pain is 10/10, dull throbbing and sharp at times, radiating down to right foot, with numbness, worse w/ sitting and standing, better lying flat. Admits taking Lyrica, ibuprofen 800mg without improvement. Pt reports he was walking a little more than usual, but no injury/trauma or any heavy lifting. Admits similar pain from before but worse this time, too painful to sit and stand. Reports currently still doing physical therapy, had x3 steroid injections. Denies any fever, chills, n/v/d/c, dizziness, chest pain, abdominal pain, dysuria, urinary/fecal incontinence, saddle anesthesia, weakness, recent travel, sick contact or any other complaints.

## 2018-07-04 NOTE — ED PROVIDER NOTE - EXTREMITY EXAM
+flexion and extension of foot, no foot drop b/l, +sensation L>R./no deformity, pain or tenderness, no restriction of movement

## 2018-07-04 NOTE — ED PROVIDER NOTE - PROGRESS NOTE DETAILS
PA LILY: patient reassessed, unable to sit up due to pain. Discussed with attending, plan to admit for pain control. Spoke with hospitalist, requests labs, neurosurgery and stat MRI w/ contrast. ALAIYAH BAUTISTA: Spoke with Neuro, no stat MR necessary at this time. Spoke with hospitalist, Dr. Mckenzie, agrees to admit patient. Pt admitted for pain control, unable to ambulate. MAR text paged.

## 2018-07-04 NOTE — ED PROVIDER NOTE - NS CPE EDP MUSC LUMBAR LOC
no midline tenderness, +paraspinal tenderness R>L, no erythema, no edema, no obvious deformity; +tenderness to right gluteal area

## 2018-07-04 NOTE — ED PROVIDER NOTE - NS CPE EDP MUSC THORACIC LOC
no midline spine tenderness, no paraspinal tenderness, no erythema, no edema, no obvious deformity b/l.

## 2018-07-04 NOTE — ED ADULT TRIAGE NOTE - CHIEF COMPLAINT QUOTE
pt bibems from home, pt herniated several discs shoveling snow months ago, now having increased pain and trouble ambulating, saw pain management md yesterday and given medication that has not worked.

## 2018-07-05 DIAGNOSIS — Z29.9 ENCOUNTER FOR PROPHYLACTIC MEASURES, UNSPECIFIED: ICD-10-CM

## 2018-07-05 DIAGNOSIS — E78.00 PURE HYPERCHOLESTEROLEMIA, UNSPECIFIED: ICD-10-CM

## 2018-07-05 DIAGNOSIS — M54.41 LUMBAGO WITH SCIATICA, RIGHT SIDE: ICD-10-CM

## 2018-07-05 DIAGNOSIS — E11.8 TYPE 2 DIABETES MELLITUS WITH UNSPECIFIED COMPLICATIONS: ICD-10-CM

## 2018-07-05 DIAGNOSIS — E87.6 HYPOKALEMIA: ICD-10-CM

## 2018-07-05 LAB
ALBUMIN SERPL ELPH-MCNC: 3.9 G/DL — SIGNIFICANT CHANGE UP (ref 3.3–5)
ALP SERPL-CCNC: 104 U/L — SIGNIFICANT CHANGE UP (ref 40–120)
ALT FLD-CCNC: 5 U/L — SIGNIFICANT CHANGE UP (ref 4–41)
AST SERPL-CCNC: 13 U/L — SIGNIFICANT CHANGE UP (ref 4–40)
BILIRUB SERPL-MCNC: 0.4 MG/DL — SIGNIFICANT CHANGE UP (ref 0.2–1.2)
BUN SERPL-MCNC: 9 MG/DL — SIGNIFICANT CHANGE UP (ref 7–23)
BUN SERPL-MCNC: 9 MG/DL — SIGNIFICANT CHANGE UP (ref 7–23)
CALCIUM SERPL-MCNC: 9.2 MG/DL — SIGNIFICANT CHANGE UP (ref 8.4–10.5)
CALCIUM SERPL-MCNC: 9.3 MG/DL — SIGNIFICANT CHANGE UP (ref 8.4–10.5)
CHLORIDE SERPL-SCNC: 100 MMOL/L — SIGNIFICANT CHANGE UP (ref 98–107)
CHLORIDE SERPL-SCNC: 99 MMOL/L — SIGNIFICANT CHANGE UP (ref 98–107)
CO2 SERPL-SCNC: 26 MMOL/L — SIGNIFICANT CHANGE UP (ref 22–31)
CO2 SERPL-SCNC: 28 MMOL/L — SIGNIFICANT CHANGE UP (ref 22–31)
CREAT SERPL-MCNC: 0.94 MG/DL — SIGNIFICANT CHANGE UP (ref 0.5–1.3)
CREAT SERPL-MCNC: 0.97 MG/DL — SIGNIFICANT CHANGE UP (ref 0.5–1.3)
GLUCOSE BLDC GLUCOMTR-MCNC: 121 MG/DL — HIGH (ref 70–99)
GLUCOSE BLDC GLUCOMTR-MCNC: 142 MG/DL — HIGH (ref 70–99)
GLUCOSE BLDC GLUCOMTR-MCNC: 152 MG/DL — HIGH (ref 70–99)
GLUCOSE BLDC GLUCOMTR-MCNC: 175 MG/DL — HIGH (ref 70–99)
GLUCOSE SERPL-MCNC: 164 MG/DL — HIGH (ref 70–99)
GLUCOSE SERPL-MCNC: 189 MG/DL — HIGH (ref 70–99)
MAGNESIUM SERPL-MCNC: 1.8 MG/DL — SIGNIFICANT CHANGE UP (ref 1.6–2.6)
POTASSIUM SERPL-MCNC: 3.1 MMOL/L — LOW (ref 3.5–5.3)
POTASSIUM SERPL-MCNC: 3.4 MMOL/L — LOW (ref 3.5–5.3)
POTASSIUM SERPL-SCNC: 3.1 MMOL/L — LOW (ref 3.5–5.3)
POTASSIUM SERPL-SCNC: 3.4 MMOL/L — LOW (ref 3.5–5.3)
PROT SERPL-MCNC: 7.5 G/DL — SIGNIFICANT CHANGE UP (ref 6–8.3)
SODIUM SERPL-SCNC: 139 MMOL/L — SIGNIFICANT CHANGE UP (ref 135–145)
SODIUM SERPL-SCNC: 140 MMOL/L — SIGNIFICANT CHANGE UP (ref 135–145)

## 2018-07-05 PROCEDURE — 99223 1ST HOSP IP/OBS HIGH 75: CPT

## 2018-07-05 PROCEDURE — 99221 1ST HOSP IP/OBS SF/LOW 40: CPT

## 2018-07-05 RX ORDER — SIMVASTATIN 20 MG/1
20 TABLET, FILM COATED ORAL AT BEDTIME
Qty: 0 | Refills: 0 | Status: DISCONTINUED | OUTPATIENT
Start: 2018-07-05 | End: 2018-07-15

## 2018-07-05 RX ORDER — HYDROMORPHONE HYDROCHLORIDE 2 MG/ML
1 INJECTION INTRAMUSCULAR; INTRAVENOUS; SUBCUTANEOUS EVERY 4 HOURS
Qty: 0 | Refills: 0 | Status: DISCONTINUED | OUTPATIENT
Start: 2018-07-05 | End: 2018-07-05

## 2018-07-05 RX ORDER — SODIUM CHLORIDE 9 MG/ML
1000 INJECTION, SOLUTION INTRAVENOUS
Qty: 0 | Refills: 0 | Status: DISCONTINUED | OUTPATIENT
Start: 2018-07-05 | End: 2018-07-15

## 2018-07-05 RX ORDER — DEXTROSE 50 % IN WATER 50 %
15 SYRINGE (ML) INTRAVENOUS ONCE
Qty: 0 | Refills: 0 | Status: DISCONTINUED | OUTPATIENT
Start: 2018-07-05 | End: 2018-07-15

## 2018-07-05 RX ORDER — GLUCAGON INJECTION, SOLUTION 0.5 MG/.1ML
1 INJECTION, SOLUTION SUBCUTANEOUS ONCE
Qty: 0 | Refills: 0 | Status: DISCONTINUED | OUTPATIENT
Start: 2018-07-05 | End: 2018-07-15

## 2018-07-05 RX ORDER — CYCLOBENZAPRINE HYDROCHLORIDE 10 MG/1
5 TABLET, FILM COATED ORAL THREE TIMES A DAY
Qty: 0 | Refills: 0 | Status: DISCONTINUED | OUTPATIENT
Start: 2018-07-05 | End: 2018-07-10

## 2018-07-05 RX ORDER — HYDROMORPHONE HYDROCHLORIDE 2 MG/ML
1 INJECTION INTRAMUSCULAR; INTRAVENOUS; SUBCUTANEOUS ONCE
Qty: 0 | Refills: 0 | Status: DISCONTINUED | OUTPATIENT
Start: 2018-07-05 | End: 2018-07-05

## 2018-07-05 RX ORDER — DEXTROSE 50 % IN WATER 50 %
12.5 SYRINGE (ML) INTRAVENOUS ONCE
Qty: 0 | Refills: 0 | Status: DISCONTINUED | OUTPATIENT
Start: 2018-07-05 | End: 2018-07-15

## 2018-07-05 RX ORDER — AMLODIPINE BESYLATE 2.5 MG/1
5 TABLET ORAL ONCE
Qty: 0 | Refills: 0 | Status: COMPLETED | OUTPATIENT
Start: 2018-07-05 | End: 2018-07-05

## 2018-07-05 RX ORDER — GABAPENTIN 400 MG/1
1 CAPSULE ORAL
Qty: 0 | Refills: 0 | COMMUNITY

## 2018-07-05 RX ORDER — INSULIN LISPRO 100/ML
VIAL (ML) SUBCUTANEOUS
Qty: 0 | Refills: 0 | Status: DISCONTINUED | OUTPATIENT
Start: 2018-07-05 | End: 2018-07-15

## 2018-07-05 RX ORDER — HYDROMORPHONE HYDROCHLORIDE 2 MG/ML
0.5 INJECTION INTRAMUSCULAR; INTRAVENOUS; SUBCUTANEOUS EVERY 4 HOURS
Qty: 0 | Refills: 0 | Status: DISCONTINUED | OUTPATIENT
Start: 2018-07-05 | End: 2018-07-05

## 2018-07-05 RX ORDER — HYDROMORPHONE HYDROCHLORIDE 2 MG/ML
2 INJECTION INTRAMUSCULAR; INTRAVENOUS; SUBCUTANEOUS EVERY 6 HOURS
Qty: 0 | Refills: 0 | Status: DISCONTINUED | OUTPATIENT
Start: 2018-07-05 | End: 2018-07-09

## 2018-07-05 RX ORDER — HEPARIN SODIUM 5000 [USP'U]/ML
5000 INJECTION INTRAVENOUS; SUBCUTANEOUS EVERY 12 HOURS
Qty: 0 | Refills: 0 | Status: DISCONTINUED | OUTPATIENT
Start: 2018-07-05 | End: 2018-07-14

## 2018-07-05 RX ORDER — INDAPAMIDE 1.25 MG
1 TABLET ORAL
Qty: 0 | Refills: 0 | COMMUNITY

## 2018-07-05 RX ORDER — MAGNESIUM SULFATE 500 MG/ML
1 VIAL (ML) INJECTION ONCE
Qty: 0 | Refills: 0 | Status: COMPLETED | OUTPATIENT
Start: 2018-07-05 | End: 2018-07-05

## 2018-07-05 RX ORDER — DEXTROSE 50 % IN WATER 50 %
25 SYRINGE (ML) INTRAVENOUS ONCE
Qty: 0 | Refills: 0 | Status: DISCONTINUED | OUTPATIENT
Start: 2018-07-05 | End: 2018-07-15

## 2018-07-05 RX ORDER — POTASSIUM CHLORIDE 20 MEQ
10 PACKET (EA) ORAL ONCE
Qty: 0 | Refills: 0 | Status: COMPLETED | OUTPATIENT
Start: 2018-07-05 | End: 2018-07-05

## 2018-07-05 RX ORDER — INSULIN LISPRO 100/ML
VIAL (ML) SUBCUTANEOUS AT BEDTIME
Qty: 0 | Refills: 0 | Status: DISCONTINUED | OUTPATIENT
Start: 2018-07-05 | End: 2018-07-15

## 2018-07-05 RX ORDER — POTASSIUM CHLORIDE 20 MEQ
20 PACKET (EA) ORAL ONCE
Qty: 0 | Refills: 0 | Status: COMPLETED | OUTPATIENT
Start: 2018-07-05 | End: 2018-07-05

## 2018-07-05 RX ADMIN — HYDROMORPHONE HYDROCHLORIDE 2 MILLIGRAM(S): 2 INJECTION INTRAMUSCULAR; INTRAVENOUS; SUBCUTANEOUS at 13:56

## 2018-07-05 RX ADMIN — Medication 50 MILLIGRAM(S): at 22:30

## 2018-07-05 RX ADMIN — HYDROMORPHONE HYDROCHLORIDE 2 MILLIGRAM(S): 2 INJECTION INTRAMUSCULAR; INTRAVENOUS; SUBCUTANEOUS at 21:15

## 2018-07-05 RX ADMIN — Medication 100 GRAM(S): at 05:58

## 2018-07-05 RX ADMIN — Medication 20 MILLIEQUIVALENT(S): at 02:35

## 2018-07-05 RX ADMIN — HYDROMORPHONE HYDROCHLORIDE 2 MILLIGRAM(S): 2 INJECTION INTRAMUSCULAR; INTRAVENOUS; SUBCUTANEOUS at 20:20

## 2018-07-05 RX ADMIN — AMLODIPINE BESYLATE 5 MILLIGRAM(S): 2.5 TABLET ORAL at 10:59

## 2018-07-05 RX ADMIN — CYCLOBENZAPRINE HYDROCHLORIDE 5 MILLIGRAM(S): 10 TABLET, FILM COATED ORAL at 18:28

## 2018-07-05 RX ADMIN — HYDROMORPHONE HYDROCHLORIDE 2 MILLIGRAM(S): 2 INJECTION INTRAMUSCULAR; INTRAVENOUS; SUBCUTANEOUS at 14:30

## 2018-07-05 RX ADMIN — SIMVASTATIN 20 MILLIGRAM(S): 20 TABLET, FILM COATED ORAL at 22:30

## 2018-07-05 RX ADMIN — HEPARIN SODIUM 5000 UNIT(S): 5000 INJECTION INTRAVENOUS; SUBCUTANEOUS at 17:16

## 2018-07-05 RX ADMIN — HYDROMORPHONE HYDROCHLORIDE 0.5 MILLIGRAM(S): 2 INJECTION INTRAMUSCULAR; INTRAVENOUS; SUBCUTANEOUS at 11:10

## 2018-07-05 RX ADMIN — HYDROMORPHONE HYDROCHLORIDE 0.5 MILLIGRAM(S): 2 INJECTION INTRAMUSCULAR; INTRAVENOUS; SUBCUTANEOUS at 10:59

## 2018-07-05 RX ADMIN — Medication 100 MILLIEQUIVALENT(S): at 11:46

## 2018-07-05 RX ADMIN — HYDROMORPHONE HYDROCHLORIDE 1 MILLIGRAM(S): 2 INJECTION INTRAMUSCULAR; INTRAVENOUS; SUBCUTANEOUS at 02:37

## 2018-07-05 RX ADMIN — HYDROMORPHONE HYDROCHLORIDE 1 MILLIGRAM(S): 2 INJECTION INTRAMUSCULAR; INTRAVENOUS; SUBCUTANEOUS at 02:00

## 2018-07-05 RX ADMIN — LIDOCAINE 1 PATCH: 4 CREAM TOPICAL at 07:30

## 2018-07-05 RX ADMIN — Medication 2: at 13:07

## 2018-07-05 NOTE — H&P ADULT - PROBLEM SELECTOR PLAN 3
-reports no longer taking insulin, just glipizide   -hold orals, place on ISS  -will email pharmacy from [facility] rec -reports no longer taking insulin, just glipizide   -hold orals, place on ISS  -will email pharmacy for complete med rec

## 2018-07-05 NOTE — H&P ADULT - NSHPPHYSICALEXAM_GEN_ALL_CORE
PHYSICAL EXAM:      Constitutional: NAD, well-groomed, well-developed  HEENT:  EOMI, Normal Hearing  Neck: No LAD, No JVD  Back: Normal spine flexure, No CVA tenderness  Respiratory: CTAB  Cardiovascular: S1 and S2, RRR  Gastrointestinal: BS+, soft, NT/ND  Extremities: No peripheral edema  Vascular: 2+ peripheral pulses  Neurological: A/O x 3  Psychiatric: Normal mood, normal affect  Musculoskeletal: 5/5 strength b/l upper; 4/5 right dorsi and plantar flexion, 5/5 on left, reports decreased sensation from right buttock, thigh to posterior leg, dorsum of foot  Skin: No rashes

## 2018-07-05 NOTE — H&P ADULT - FAMILY HISTORY
Family history of heart failure, father     Sibling  Still living? Unknown  Family history of diabetes mellitus, Age at diagnosis: Age Unknown     Grandparent  Still living? Unknown  Family history of cerebrovascular accident (CVA), Age at diagnosis: Age Unknown  Family history of hypertension, Age at diagnosis: Age Unknown     Aunt  Still living? Unknown  Family history of breast cancer, Age at diagnosis: Age Unknown     Aunt  Still living? Unknown  Family history of breast cancer, Age at diagnosis: Age Unknown

## 2018-07-05 NOTE — H&P ADULT - NSHPLABSRESULTS_GEN_ALL_CORE
14.0   8.49  )-----------( 201      ( 04 Jul 2018 23:14 )             43.3     07-04    140  |  99  |  9   ----------------------------<  189<H>  3.1<L>   |  26  |  0.94    Ca    9.2      04 Jul 2018 23:14  Mg     1.8     07-04    TPro  7.5  /  Alb  3.9  /  TBili  0.4  /  DBili  x   /  AST  13  /  ALT  5   /  AlkPhos  104  07-04    CAPILLARY BLOOD GLUCOSE            Vital Signs Last 24 Hrs  T(C): 36.9 (04 Jul 2018 23:42), Max: 36.9 (04 Jul 2018 18:48)  T(F): 98.4 (04 Jul 2018 23:42), Max: 98.5 (04 Jul 2018 18:48)  HR: 81 (04 Jul 2018 23:42) (81 - 90)  BP: 150/97 (04 Jul 2018 23:42) (149/103 - 150/97)  BP(mean): --  RR: 18 (04 Jul 2018 23:42) (16 - 18)  SpO2: 99% (04 Jul 2018 23:42) (99% - 99%)

## 2018-07-05 NOTE — ED ADULT NURSE NOTE - CHPI ED SYMPTOMS NEG
no fatigue/no neck tenderness/no numbness/no motor function loss/no tingling/no anorexia/no bladder dysfunction/no bowel dysfunction/no constipation

## 2018-07-05 NOTE — H&P ADULT - NSHPREVIEWOFSYSTEMS_GEN_ALL_CORE
Review of Systems:   CONSTITUTIONAL: No fever, weight loss, or fatigue  EYES: No eye pain, visual disturbances, or discharge  ENMT:  No difficulty hearing, tinnitus, vertigo; No sinus or throat pain  NECK: No pain or stiffness  RESPIRATORY: No cough, wheezing, chills or hemoptysis; No shortness of breath  CARDIOVASCULAR: No chest pain, palpitations, dizziness, or leg swelling  GASTROINTESTINAL: No abdominal or epigastric pain. No nausea, vomiting, or hematemesis; No diarrhea or constipation. No melena or hematochezia.  GENITOURINARY: No dysuria, frequency, hematuria, or incontinence  NEUROLOGICAL: No headaches, memory loss; worsening of chronic right leg weakness, numbness, pain  SKIN: No itching, burning, rashes, or lesions   LYMPH NODES: No enlarged glands  ENDOCRINE: No heat or cold intolerance; No hair loss  MUSCULOSKELETAL: No joint pain or swelling; No muscle, back, or extremity pain  PSYCHIATRIC: No depression, anxiety, mood swings, or difficulty sleeping  HEME/LYMPH: No easy bruising, or bleeding gums  ALLERY AND IMMUNOLOGIC: No hives or eczema

## 2018-07-05 NOTE — H&P ADULT - PROBLEM SELECTOR PLAN 1
-seen by neurology, who recommend no need for MRI   -symptoms stemming from worsening of  known radiculopathy   -pain control  -encourage weight loss  -PM&R

## 2018-07-05 NOTE — ED ADULT NURSE NOTE - OBJECTIVE STATEMENT
Patient received from intake area around 2300, accompanied by three family members, complaining of chronic back pain radiating to right lower extremities, He is alert and oriented x 4, lungs are clear bilaterally, S1, S2 regular , abdomen is obese. Patient is lying in bed, states that he is unable to walk at this time. Skin is intact. 20 gauge saline lock inserted on right arm, blood drawn and sent. will follow up.

## 2018-07-05 NOTE — CONSULT NOTE ADULT - SUBJECTIVE AND OBJECTIVE BOX
HPI: 47 y/o male with past medical history DM2, HTN, HLD, Multiple lumbar disc herniations (L3-L4/L4-L5/L5-S1) presents to the ED with complaint of worsening lower back pain. Patient states he was walking around last night when he felt worsening lower back pain described as "achy and sharp" originating in the right lower back region with radiation down the anterolateral thigh, posterior leg, and dorsum of the foot.  Patient rated the pain 10/10 in severity at onset and stated the pain was minimally relieved by laying down on his side and worsened with any type of movement. Patient admitted to accompanying right lower leg weakness which he stated caused his leg to buckle when attempting to walk. Patient denied and urinary incontinence, fecal incontinence, or saddle anesthesia. Patient did admit to accompanying right lower extremity numbness localized to the same distribution as the RLE pain.   Of note, patient reports the symptoms first presented in January of 2018 and states they are unchanged from then however, the pain has slightly increased over the past day.           MEDICATIONS  (STANDING):    MEDICATIONS  (PRN):    PAST MEDICAL & SURGICAL HISTORY:  Hypercholesteremia  Diabetes mellitus: ~ diagnosed during week of 08/13 - 19/2017  Abscess  H/O arthroscopy of right knee    FAMILY HISTORY:  Family history of heart failure: father  Family history of breast cancer (Aunt, Aunt)  Family history of hypertension (Grandparent): grandmother, mother  Family history of cerebrovascular accident (CVA) (Grandparent): grandmother  Family history of diabetes mellitus (Sibling): parents, sister    Allergies    No Known Allergies    Intolerances    pork (Other)      SHx - No smoking, No ETOH, No drug abuse      Review of Systems:  As per HPI        Vital Signs Last 24 Hrs  T(C): 36.9 (04 Jul 2018 23:42), Max: 36.9 (04 Jul 2018 18:48)  T(F): 98.4 (04 Jul 2018 23:42), Max: 98.5 (04 Jul 2018 18:48)  HR: 81 (04 Jul 2018 23:42) (81 - 90)  BP: 150/97 (04 Jul 2018 23:42) (149/103 - 150/97)  BP(mean): --  RR: 18 (04 Jul 2018 23:42) (16 - 18)  SpO2: 99% (04 Jul 2018 23:42) (99% - 99%)    General Exam:   General appearance: Mild distress             Neurological Exam:  Neurologic exam limited secondary to pain    Mental Status: Orientated to self, date and place.  Attention intact.  No dysarthria. Speech fluent.  Cranial Nerves:   PERRL, EOMI, VFF, no nystagmus.    CN V1-3 intact to light touch .  No facial asymmetry.  Hearing intact to finger rub bilaterally.  Tongue, uvula and palate midline.  Sternocleidomastoid and Trapezius intact bilaterally.    Motor:   Tone: normal.                  Strength:     [] Upper extremity                      Delt       Bicep    Tricep                                                  R         5/5 5/5 5/5 5/5                                               L          5/5 5/5 5/5 5/5  [] Lower extremity                       HF          KE          KF        DF         PF                                               R        5/5 5/5 5/5 5/5 5/5                                               L         5/5 5/5 5/5 5/5 5/5                     Tremor: No resting, postural or action tremor.     Sensation: +Decreased sensation to light touch in the right anterolateral thigh, anterior and posterior leg, and dorsum of the foot.    Deep Tendon Reflexes:     Biceps            BR        Patellar        Ankle         Babinski                                         R       2+         2+           0               0           downgoing                                         L        2+         2+           0               0            downgoing    Gait: Not assessed as patient unable to cooperate secondary to pain    Other:    07-04    140  |  99  |  9   ----------------------------<  189<H>  3.1<L>   |  26  |  0.94    Ca    9.2      04 Jul 2018 23:14    TPro  7.5  /  Alb  3.9  /  TBili  0.4  /  DBili  x   /  AST  13  /  ALT  5   /  AlkPhos  104  07-04                            14.0   8.49  )-----------( 201      ( 04 Jul 2018 23:14 )             43.3

## 2018-07-06 LAB
BUN SERPL-MCNC: 9 MG/DL — SIGNIFICANT CHANGE UP (ref 7–23)
CALCIUM SERPL-MCNC: 9.5 MG/DL — SIGNIFICANT CHANGE UP (ref 8.4–10.5)
CHLORIDE SERPL-SCNC: 98 MMOL/L — SIGNIFICANT CHANGE UP (ref 98–107)
CO2 SERPL-SCNC: 28 MMOL/L — SIGNIFICANT CHANGE UP (ref 22–31)
CREAT SERPL-MCNC: 0.94 MG/DL — SIGNIFICANT CHANGE UP (ref 0.5–1.3)
GLUCOSE BLDC GLUCOMTR-MCNC: 123 MG/DL — HIGH (ref 70–99)
GLUCOSE BLDC GLUCOMTR-MCNC: 146 MG/DL — HIGH (ref 70–99)
GLUCOSE BLDC GLUCOMTR-MCNC: 146 MG/DL — HIGH (ref 70–99)
GLUCOSE BLDC GLUCOMTR-MCNC: 149 MG/DL — HIGH (ref 70–99)
GLUCOSE SERPL-MCNC: 142 MG/DL — HIGH (ref 70–99)
HCT VFR BLD CALC: 48.6 % — SIGNIFICANT CHANGE UP (ref 39–50)
HGB BLD-MCNC: 16.1 G/DL — SIGNIFICANT CHANGE UP (ref 13–17)
MCHC RBC-ENTMCNC: 29.8 PG — SIGNIFICANT CHANGE UP (ref 27–34)
MCHC RBC-ENTMCNC: 33.1 % — SIGNIFICANT CHANGE UP (ref 32–36)
MCV RBC AUTO: 90 FL — SIGNIFICANT CHANGE UP (ref 80–100)
NRBC # FLD: 0 — SIGNIFICANT CHANGE UP
PLATELET # BLD AUTO: 207 K/UL — SIGNIFICANT CHANGE UP (ref 150–400)
PMV BLD: 11.2 FL — SIGNIFICANT CHANGE UP (ref 7–13)
POTASSIUM SERPL-MCNC: 3.2 MMOL/L — LOW (ref 3.5–5.3)
POTASSIUM SERPL-SCNC: 3.2 MMOL/L — LOW (ref 3.5–5.3)
RBC # BLD: 5.4 M/UL — SIGNIFICANT CHANGE UP (ref 4.2–5.8)
RBC # FLD: 12.3 % — SIGNIFICANT CHANGE UP (ref 10.3–14.5)
SODIUM SERPL-SCNC: 137 MMOL/L — SIGNIFICANT CHANGE UP (ref 135–145)
WBC # BLD: 6.02 K/UL — SIGNIFICANT CHANGE UP (ref 3.8–10.5)
WBC # FLD AUTO: 6.02 K/UL — SIGNIFICANT CHANGE UP (ref 3.8–10.5)

## 2018-07-06 RX ORDER — POTASSIUM CHLORIDE 20 MEQ
40 PACKET (EA) ORAL EVERY 4 HOURS
Qty: 0 | Refills: 0 | Status: COMPLETED | OUTPATIENT
Start: 2018-07-06 | End: 2018-07-06

## 2018-07-06 RX ORDER — POLYETHYLENE GLYCOL 3350 17 G/17G
17 POWDER, FOR SOLUTION ORAL DAILY
Qty: 0 | Refills: 0 | Status: DISCONTINUED | OUTPATIENT
Start: 2018-07-06 | End: 2018-07-15

## 2018-07-06 RX ORDER — SENNA PLUS 8.6 MG/1
2 TABLET ORAL AT BEDTIME
Qty: 0 | Refills: 0 | Status: DISCONTINUED | OUTPATIENT
Start: 2018-07-06 | End: 2018-07-15

## 2018-07-06 RX ORDER — DOCUSATE SODIUM 100 MG
100 CAPSULE ORAL THREE TIMES A DAY
Qty: 0 | Refills: 0 | Status: DISCONTINUED | OUTPATIENT
Start: 2018-07-06 | End: 2018-07-15

## 2018-07-06 RX ORDER — KETOROLAC TROMETHAMINE 30 MG/ML
15 SYRINGE (ML) INJECTION EVERY 6 HOURS
Qty: 0 | Refills: 0 | Status: DISCONTINUED | OUTPATIENT
Start: 2018-07-06 | End: 2018-07-08

## 2018-07-06 RX ADMIN — HEPARIN SODIUM 5000 UNIT(S): 5000 INJECTION INTRAVENOUS; SUBCUTANEOUS at 18:13

## 2018-07-06 RX ADMIN — Medication 50 MILLIGRAM(S): at 21:31

## 2018-07-06 RX ADMIN — CYCLOBENZAPRINE HYDROCHLORIDE 5 MILLIGRAM(S): 10 TABLET, FILM COATED ORAL at 09:39

## 2018-07-06 RX ADMIN — HEPARIN SODIUM 5000 UNIT(S): 5000 INJECTION INTRAVENOUS; SUBCUTANEOUS at 06:17

## 2018-07-06 RX ADMIN — HYDROMORPHONE HYDROCHLORIDE 2 MILLIGRAM(S): 2 INJECTION INTRAMUSCULAR; INTRAVENOUS; SUBCUTANEOUS at 15:15

## 2018-07-06 RX ADMIN — Medication 100 MILLIGRAM(S): at 21:30

## 2018-07-06 RX ADMIN — HYDROMORPHONE HYDROCHLORIDE 2 MILLIGRAM(S): 2 INJECTION INTRAMUSCULAR; INTRAVENOUS; SUBCUTANEOUS at 16:00

## 2018-07-06 RX ADMIN — HYDROMORPHONE HYDROCHLORIDE 2 MILLIGRAM(S): 2 INJECTION INTRAMUSCULAR; INTRAVENOUS; SUBCUTANEOUS at 07:54

## 2018-07-06 RX ADMIN — Medication 100 MILLIGRAM(S): at 15:15

## 2018-07-06 RX ADMIN — HYDROMORPHONE HYDROCHLORIDE 2 MILLIGRAM(S): 2 INJECTION INTRAMUSCULAR; INTRAVENOUS; SUBCUTANEOUS at 21:31

## 2018-07-06 RX ADMIN — SENNA PLUS 2 TABLET(S): 8.6 TABLET ORAL at 21:30

## 2018-07-06 RX ADMIN — SIMVASTATIN 20 MILLIGRAM(S): 20 TABLET, FILM COATED ORAL at 21:33

## 2018-07-06 RX ADMIN — Medication 15 MILLIGRAM(S): at 18:18

## 2018-07-06 RX ADMIN — HYDROMORPHONE HYDROCHLORIDE 2 MILLIGRAM(S): 2 INJECTION INTRAMUSCULAR; INTRAVENOUS; SUBCUTANEOUS at 08:45

## 2018-07-06 RX ADMIN — HYDROMORPHONE HYDROCHLORIDE 2 MILLIGRAM(S): 2 INJECTION INTRAMUSCULAR; INTRAVENOUS; SUBCUTANEOUS at 22:03

## 2018-07-06 RX ADMIN — Medication 40 MILLIEQUIVALENT(S): at 15:15

## 2018-07-07 LAB
BUN SERPL-MCNC: 14 MG/DL — SIGNIFICANT CHANGE UP (ref 7–23)
CALCIUM SERPL-MCNC: 9.4 MG/DL — SIGNIFICANT CHANGE UP (ref 8.4–10.5)
CHLORIDE SERPL-SCNC: 96 MMOL/L — LOW (ref 98–107)
CO2 SERPL-SCNC: 29 MMOL/L — SIGNIFICANT CHANGE UP (ref 22–31)
CREAT SERPL-MCNC: 1.01 MG/DL — SIGNIFICANT CHANGE UP (ref 0.5–1.3)
GLUCOSE BLDC GLUCOMTR-MCNC: 123 MG/DL — HIGH (ref 70–99)
GLUCOSE BLDC GLUCOMTR-MCNC: 131 MG/DL — HIGH (ref 70–99)
GLUCOSE BLDC GLUCOMTR-MCNC: 140 MG/DL — HIGH (ref 70–99)
GLUCOSE BLDC GLUCOMTR-MCNC: 153 MG/DL — HIGH (ref 70–99)
GLUCOSE SERPL-MCNC: 147 MG/DL — HIGH (ref 70–99)
POTASSIUM SERPL-MCNC: 3.3 MMOL/L — LOW (ref 3.5–5.3)
POTASSIUM SERPL-SCNC: 3.3 MMOL/L — LOW (ref 3.5–5.3)
SODIUM SERPL-SCNC: 140 MMOL/L — SIGNIFICANT CHANGE UP (ref 135–145)

## 2018-07-07 RX ORDER — OXYCODONE HYDROCHLORIDE 5 MG/1
5 TABLET ORAL ONCE
Qty: 0 | Refills: 0 | Status: DISCONTINUED | OUTPATIENT
Start: 2018-07-07 | End: 2018-07-07

## 2018-07-07 RX ORDER — POTASSIUM CHLORIDE 20 MEQ
40 PACKET (EA) ORAL EVERY 4 HOURS
Qty: 0 | Refills: 0 | Status: COMPLETED | OUTPATIENT
Start: 2018-07-07 | End: 2018-07-07

## 2018-07-07 RX ADMIN — SENNA PLUS 2 TABLET(S): 8.6 TABLET ORAL at 21:28

## 2018-07-07 RX ADMIN — HEPARIN SODIUM 5000 UNIT(S): 5000 INJECTION INTRAVENOUS; SUBCUTANEOUS at 06:29

## 2018-07-07 RX ADMIN — Medication 15 MILLIGRAM(S): at 17:58

## 2018-07-07 RX ADMIN — HYDROMORPHONE HYDROCHLORIDE 2 MILLIGRAM(S): 2 INJECTION INTRAMUSCULAR; INTRAVENOUS; SUBCUTANEOUS at 09:37

## 2018-07-07 RX ADMIN — Medication 15 MILLIGRAM(S): at 06:29

## 2018-07-07 RX ADMIN — POLYETHYLENE GLYCOL 3350 17 GRAM(S): 17 POWDER, FOR SOLUTION ORAL at 12:36

## 2018-07-07 RX ADMIN — Medication 100 MILLIGRAM(S): at 14:11

## 2018-07-07 RX ADMIN — Medication 15 MILLIGRAM(S): at 12:36

## 2018-07-07 RX ADMIN — HEPARIN SODIUM 5000 UNIT(S): 5000 INJECTION INTRAVENOUS; SUBCUTANEOUS at 17:58

## 2018-07-07 RX ADMIN — Medication 40 MILLIEQUIVALENT(S): at 17:57

## 2018-07-07 RX ADMIN — SIMVASTATIN 20 MILLIGRAM(S): 20 TABLET, FILM COATED ORAL at 21:28

## 2018-07-07 RX ADMIN — HYDROMORPHONE HYDROCHLORIDE 2 MILLIGRAM(S): 2 INJECTION INTRAMUSCULAR; INTRAVENOUS; SUBCUTANEOUS at 22:45

## 2018-07-07 RX ADMIN — Medication 15 MILLIGRAM(S): at 01:13

## 2018-07-07 RX ADMIN — HYDROMORPHONE HYDROCHLORIDE 2 MILLIGRAM(S): 2 INJECTION INTRAMUSCULAR; INTRAVENOUS; SUBCUTANEOUS at 15:55

## 2018-07-07 RX ADMIN — HYDROMORPHONE HYDROCHLORIDE 2 MILLIGRAM(S): 2 INJECTION INTRAMUSCULAR; INTRAVENOUS; SUBCUTANEOUS at 03:33

## 2018-07-07 RX ADMIN — Medication 50 MILLIGRAM(S): at 21:28

## 2018-07-07 RX ADMIN — HYDROMORPHONE HYDROCHLORIDE 2 MILLIGRAM(S): 2 INJECTION INTRAMUSCULAR; INTRAVENOUS; SUBCUTANEOUS at 21:59

## 2018-07-07 RX ADMIN — Medication 100 MILLIGRAM(S): at 06:31

## 2018-07-07 RX ADMIN — HYDROMORPHONE HYDROCHLORIDE 2 MILLIGRAM(S): 2 INJECTION INTRAMUSCULAR; INTRAVENOUS; SUBCUTANEOUS at 10:15

## 2018-07-07 RX ADMIN — Medication 40 MILLIEQUIVALENT(S): at 14:11

## 2018-07-07 RX ADMIN — Medication 100 MILLIGRAM(S): at 21:28

## 2018-07-07 RX ADMIN — HYDROMORPHONE HYDROCHLORIDE 2 MILLIGRAM(S): 2 INJECTION INTRAMUSCULAR; INTRAVENOUS; SUBCUTANEOUS at 04:05

## 2018-07-07 RX ADMIN — CYCLOBENZAPRINE HYDROCHLORIDE 5 MILLIGRAM(S): 10 TABLET, FILM COATED ORAL at 14:11

## 2018-07-07 RX ADMIN — HYDROMORPHONE HYDROCHLORIDE 2 MILLIGRAM(S): 2 INJECTION INTRAMUSCULAR; INTRAVENOUS; SUBCUTANEOUS at 16:30

## 2018-07-08 LAB
BUN SERPL-MCNC: 19 MG/DL — SIGNIFICANT CHANGE UP (ref 7–23)
CALCIUM SERPL-MCNC: 9.6 MG/DL — SIGNIFICANT CHANGE UP (ref 8.4–10.5)
CHLORIDE SERPL-SCNC: 100 MMOL/L — SIGNIFICANT CHANGE UP (ref 98–107)
CO2 SERPL-SCNC: 28 MMOL/L — SIGNIFICANT CHANGE UP (ref 22–31)
CREAT SERPL-MCNC: 1.01 MG/DL — SIGNIFICANT CHANGE UP (ref 0.5–1.3)
GLUCOSE BLDC GLUCOMTR-MCNC: 123 MG/DL — HIGH (ref 70–99)
GLUCOSE BLDC GLUCOMTR-MCNC: 181 MG/DL — HIGH (ref 70–99)
GLUCOSE BLDC GLUCOMTR-MCNC: 194 MG/DL — HIGH (ref 70–99)
GLUCOSE BLDC GLUCOMTR-MCNC: 225 MG/DL — HIGH (ref 70–99)
GLUCOSE SERPL-MCNC: 136 MG/DL — HIGH (ref 70–99)
HCT VFR BLD CALC: 49 % — SIGNIFICANT CHANGE UP (ref 39–50)
HGB BLD-MCNC: 16 G/DL — SIGNIFICANT CHANGE UP (ref 13–17)
MCHC RBC-ENTMCNC: 29.5 PG — SIGNIFICANT CHANGE UP (ref 27–34)
MCHC RBC-ENTMCNC: 32.7 % — SIGNIFICANT CHANGE UP (ref 32–36)
MCV RBC AUTO: 90.4 FL — SIGNIFICANT CHANGE UP (ref 80–100)
NRBC # FLD: 0 — SIGNIFICANT CHANGE UP
PLATELET # BLD AUTO: 205 K/UL — SIGNIFICANT CHANGE UP (ref 150–400)
PMV BLD: 11.1 FL — SIGNIFICANT CHANGE UP (ref 7–13)
POTASSIUM SERPL-MCNC: 3.7 MMOL/L — SIGNIFICANT CHANGE UP (ref 3.5–5.3)
POTASSIUM SERPL-SCNC: 3.7 MMOL/L — SIGNIFICANT CHANGE UP (ref 3.5–5.3)
RBC # BLD: 5.42 M/UL — SIGNIFICANT CHANGE UP (ref 4.2–5.8)
RBC # FLD: 12.3 % — SIGNIFICANT CHANGE UP (ref 10.3–14.5)
SODIUM SERPL-SCNC: 139 MMOL/L — SIGNIFICANT CHANGE UP (ref 135–145)
WBC # BLD: 5.8 K/UL — SIGNIFICANT CHANGE UP (ref 3.8–10.5)
WBC # FLD AUTO: 5.8 K/UL — SIGNIFICANT CHANGE UP (ref 3.8–10.5)

## 2018-07-08 RX ORDER — HYDROMORPHONE HYDROCHLORIDE 2 MG/ML
0.5 INJECTION INTRAMUSCULAR; INTRAVENOUS; SUBCUTANEOUS ONCE
Qty: 0 | Refills: 0 | Status: DISCONTINUED | OUTPATIENT
Start: 2018-07-08 | End: 2018-07-08

## 2018-07-08 RX ADMIN — HYDROMORPHONE HYDROCHLORIDE 2 MILLIGRAM(S): 2 INJECTION INTRAMUSCULAR; INTRAVENOUS; SUBCUTANEOUS at 23:15

## 2018-07-08 RX ADMIN — HYDROMORPHONE HYDROCHLORIDE 0.5 MILLIGRAM(S): 2 INJECTION INTRAMUSCULAR; INTRAVENOUS; SUBCUTANEOUS at 09:29

## 2018-07-08 RX ADMIN — HYDROMORPHONE HYDROCHLORIDE 2 MILLIGRAM(S): 2 INJECTION INTRAMUSCULAR; INTRAVENOUS; SUBCUTANEOUS at 04:18

## 2018-07-08 RX ADMIN — HYDROMORPHONE HYDROCHLORIDE 2 MILLIGRAM(S): 2 INJECTION INTRAMUSCULAR; INTRAVENOUS; SUBCUTANEOUS at 17:15

## 2018-07-08 RX ADMIN — Medication 50 MILLIGRAM(S): at 21:26

## 2018-07-08 RX ADMIN — HEPARIN SODIUM 5000 UNIT(S): 5000 INJECTION INTRAVENOUS; SUBCUTANEOUS at 18:38

## 2018-07-08 RX ADMIN — Medication 15 MILLIGRAM(S): at 06:58

## 2018-07-08 RX ADMIN — HYDROMORPHONE HYDROCHLORIDE 2 MILLIGRAM(S): 2 INJECTION INTRAMUSCULAR; INTRAVENOUS; SUBCUTANEOUS at 04:54

## 2018-07-08 RX ADMIN — SIMVASTATIN 20 MILLIGRAM(S): 20 TABLET, FILM COATED ORAL at 21:26

## 2018-07-08 RX ADMIN — HEPARIN SODIUM 5000 UNIT(S): 5000 INJECTION INTRAVENOUS; SUBCUTANEOUS at 06:58

## 2018-07-08 RX ADMIN — HYDROMORPHONE HYDROCHLORIDE 2 MILLIGRAM(S): 2 INJECTION INTRAMUSCULAR; INTRAVENOUS; SUBCUTANEOUS at 22:35

## 2018-07-08 RX ADMIN — CYCLOBENZAPRINE HYDROCHLORIDE 5 MILLIGRAM(S): 10 TABLET, FILM COATED ORAL at 22:36

## 2018-07-08 RX ADMIN — Medication 100 MILLIGRAM(S): at 21:26

## 2018-07-08 RX ADMIN — Medication 15 MILLIGRAM(S): at 01:57

## 2018-07-08 RX ADMIN — Medication 15 MILLIGRAM(S): at 11:51

## 2018-07-08 RX ADMIN — Medication 100 MILLIGRAM(S): at 06:58

## 2018-07-08 RX ADMIN — HYDROMORPHONE HYDROCHLORIDE 0.5 MILLIGRAM(S): 2 INJECTION INTRAMUSCULAR; INTRAVENOUS; SUBCUTANEOUS at 09:50

## 2018-07-08 RX ADMIN — SENNA PLUS 2 TABLET(S): 8.6 TABLET ORAL at 21:26

## 2018-07-08 RX ADMIN — HYDROMORPHONE HYDROCHLORIDE 2 MILLIGRAM(S): 2 INJECTION INTRAMUSCULAR; INTRAVENOUS; SUBCUTANEOUS at 16:34

## 2018-07-09 ENCOUNTER — TRANSCRIPTION ENCOUNTER (OUTPATIENT)
Age: 48
End: 2018-07-09

## 2018-07-09 LAB
BUN SERPL-MCNC: 16 MG/DL — SIGNIFICANT CHANGE UP (ref 7–23)
CALCIUM SERPL-MCNC: 9.6 MG/DL — SIGNIFICANT CHANGE UP (ref 8.4–10.5)
CHLORIDE SERPL-SCNC: 100 MMOL/L — SIGNIFICANT CHANGE UP (ref 98–107)
CO2 SERPL-SCNC: 26 MMOL/L — SIGNIFICANT CHANGE UP (ref 22–31)
CREAT SERPL-MCNC: 0.95 MG/DL — SIGNIFICANT CHANGE UP (ref 0.5–1.3)
GLUCOSE BLDC GLUCOMTR-MCNC: 120 MG/DL — HIGH (ref 70–99)
GLUCOSE BLDC GLUCOMTR-MCNC: 137 MG/DL — HIGH (ref 70–99)
GLUCOSE BLDC GLUCOMTR-MCNC: 164 MG/DL — HIGH (ref 70–99)
GLUCOSE BLDC GLUCOMTR-MCNC: 165 MG/DL — HIGH (ref 70–99)
GLUCOSE SERPL-MCNC: 141 MG/DL — HIGH (ref 70–99)
HBA1C BLD-MCNC: 6.6 % — HIGH (ref 4–5.6)
HCT VFR BLD CALC: 47 % — SIGNIFICANT CHANGE UP (ref 39–50)
HGB BLD-MCNC: 16.1 G/DL — SIGNIFICANT CHANGE UP (ref 13–17)
MCHC RBC-ENTMCNC: 29.8 PG — SIGNIFICANT CHANGE UP (ref 27–34)
MCHC RBC-ENTMCNC: 34.3 % — SIGNIFICANT CHANGE UP (ref 32–36)
MCV RBC AUTO: 87 FL — SIGNIFICANT CHANGE UP (ref 80–100)
NRBC # FLD: 0 — SIGNIFICANT CHANGE UP
PLATELET # BLD AUTO: 201 K/UL — SIGNIFICANT CHANGE UP (ref 150–400)
PMV BLD: 11.3 FL — SIGNIFICANT CHANGE UP (ref 7–13)
POTASSIUM SERPL-MCNC: 3.7 MMOL/L — SIGNIFICANT CHANGE UP (ref 3.5–5.3)
POTASSIUM SERPL-SCNC: 3.7 MMOL/L — SIGNIFICANT CHANGE UP (ref 3.5–5.3)
RBC # BLD: 5.4 M/UL — SIGNIFICANT CHANGE UP (ref 4.2–5.8)
RBC # FLD: 12.4 % — SIGNIFICANT CHANGE UP (ref 10.3–14.5)
SODIUM SERPL-SCNC: 140 MMOL/L — SIGNIFICANT CHANGE UP (ref 135–145)
WBC # BLD: 5.72 K/UL — SIGNIFICANT CHANGE UP (ref 3.8–10.5)
WBC # FLD AUTO: 5.72 K/UL — SIGNIFICANT CHANGE UP (ref 3.8–10.5)

## 2018-07-09 PROCEDURE — 99222 1ST HOSP IP/OBS MODERATE 55: CPT | Mod: GC

## 2018-07-09 RX ORDER — HYDROMORPHONE HYDROCHLORIDE 2 MG/ML
3 INJECTION INTRAMUSCULAR; INTRAVENOUS; SUBCUTANEOUS EVERY 4 HOURS
Qty: 0 | Refills: 0 | Status: DISCONTINUED | OUTPATIENT
Start: 2018-07-09 | End: 2018-07-10

## 2018-07-09 RX ORDER — GABAPENTIN 400 MG/1
200 CAPSULE ORAL THREE TIMES A DAY
Qty: 0 | Refills: 0 | Status: DISCONTINUED | OUTPATIENT
Start: 2018-07-09 | End: 2018-07-09

## 2018-07-09 RX ORDER — GABAPENTIN 400 MG/1
300 CAPSULE ORAL EVERY 8 HOURS
Qty: 0 | Refills: 0 | Status: DISCONTINUED | OUTPATIENT
Start: 2018-07-09 | End: 2018-07-12

## 2018-07-09 RX ADMIN — HEPARIN SODIUM 5000 UNIT(S): 5000 INJECTION INTRAVENOUS; SUBCUTANEOUS at 05:31

## 2018-07-09 RX ADMIN — HYDROMORPHONE HYDROCHLORIDE 3 MILLIGRAM(S): 2 INJECTION INTRAMUSCULAR; INTRAVENOUS; SUBCUTANEOUS at 21:47

## 2018-07-09 RX ADMIN — GABAPENTIN 300 MILLIGRAM(S): 400 CAPSULE ORAL at 21:18

## 2018-07-09 RX ADMIN — Medication 100 MILLIGRAM(S): at 12:19

## 2018-07-09 RX ADMIN — CYCLOBENZAPRINE HYDROCHLORIDE 5 MILLIGRAM(S): 10 TABLET, FILM COATED ORAL at 18:31

## 2018-07-09 RX ADMIN — CYCLOBENZAPRINE HYDROCHLORIDE 5 MILLIGRAM(S): 10 TABLET, FILM COATED ORAL at 08:47

## 2018-07-09 RX ADMIN — Medication 100 MILLIGRAM(S): at 21:18

## 2018-07-09 RX ADMIN — SIMVASTATIN 20 MILLIGRAM(S): 20 TABLET, FILM COATED ORAL at 21:18

## 2018-07-09 RX ADMIN — HYDROMORPHONE HYDROCHLORIDE 3 MILLIGRAM(S): 2 INJECTION INTRAMUSCULAR; INTRAVENOUS; SUBCUTANEOUS at 21:17

## 2018-07-09 RX ADMIN — SENNA PLUS 2 TABLET(S): 8.6 TABLET ORAL at 21:18

## 2018-07-09 RX ADMIN — HEPARIN SODIUM 5000 UNIT(S): 5000 INJECTION INTRAVENOUS; SUBCUTANEOUS at 17:41

## 2018-07-09 RX ADMIN — Medication 100 MILLIGRAM(S): at 05:31

## 2018-07-09 RX ADMIN — HYDROMORPHONE HYDROCHLORIDE 2 MILLIGRAM(S): 2 INJECTION INTRAMUSCULAR; INTRAVENOUS; SUBCUTANEOUS at 06:15

## 2018-07-09 RX ADMIN — Medication 2: at 12:16

## 2018-07-09 RX ADMIN — HYDROMORPHONE HYDROCHLORIDE 3 MILLIGRAM(S): 2 INJECTION INTRAMUSCULAR; INTRAVENOUS; SUBCUTANEOUS at 15:50

## 2018-07-09 RX ADMIN — Medication 50 MILLIGRAM(S): at 21:17

## 2018-07-09 RX ADMIN — GABAPENTIN 300 MILLIGRAM(S): 400 CAPSULE ORAL at 12:18

## 2018-07-09 RX ADMIN — HYDROMORPHONE HYDROCHLORIDE 2 MILLIGRAM(S): 2 INJECTION INTRAMUSCULAR; INTRAVENOUS; SUBCUTANEOUS at 05:31

## 2018-07-09 RX ADMIN — POLYETHYLENE GLYCOL 3350 17 GRAM(S): 17 POWDER, FOR SOLUTION ORAL at 12:19

## 2018-07-09 RX ADMIN — HYDROMORPHONE HYDROCHLORIDE 3 MILLIGRAM(S): 2 INJECTION INTRAMUSCULAR; INTRAVENOUS; SUBCUTANEOUS at 15:19

## 2018-07-09 NOTE — DISCHARGE NOTE ADULT - CARE PLAN
Principal Discharge DX:	Acute right-sided low back pain with right-sided sciatica  Goal:	pain management  Assessment and plan of treatment:	Continue pain medications as needed.  Secondary Diagnosis:	Type 2 diabetes mellitus with complication, unspecified whether long term insulin use  Assessment and plan of treatment:	A1c  Secondary Diagnosis:	Hypercholesteremia  Assessment and plan of treatment:	Continue all medications.

## 2018-07-09 NOTE — DISCHARGE NOTE ADULT - MEDICATION SUMMARY - MEDICATIONS TO STOP TAKING
I will STOP taking the medications listed below when I get home from the hospital:    glipiZIDE 10 mg oral tablet  -- 1 tab(s) by mouth 2 times a day, As Needed

## 2018-07-09 NOTE — DISCHARGE NOTE ADULT - HOSPITAL COURSE
47 yo m with acute on chronic back pain/sciatica.  Pain control.  Neuro consulted, no need for MRI at this time - pain control and weight loss.  PM&R for pain control---------  PT recs rehab, pt refuses - for home PT.    dispo: 49 yo m with acute on chronic back pain/sciatica.  Pain control.  Neuro consulted, no need for MRI at this time - pain control and weight loss.  PM&R for pain control---------  PT recs rehab, pt refuses - for home PT.    Dispo:    Acute right-sided low back pain with right-sided sciatica  -Neurology following  -No need for MRI of Lumbar spine at this time  -Optimize pain control  -Encourage weight optimization  -Continue with Physical Therapy as outpatient   -Physical Medicine and Rehab following    Recommend Medrol Dosepak, monitor BS  -Increase pregabalin to 50 mg TID  - Oxycodone   Ortho recs: Pain control  WBAT with assistive devices as needed  FU Labs/imaging  NPO pMN/IVF  Will need medical clearance prior to OR  Transfer to [facility] for L3-5 laminectomy/discectomy tomorrow     -MRI can be done as outpt if pain can be controlled ( pt had MRI in [place])  -PT recs to Rehab : Pt refusing : Home PT   -Xray of spine:X-ray Spine:6 lumbar morphology vertebrae noted which could be related to hypoplastic   12th ribs or a transitional lumbarized S1 segment with rudimentary disc   space.  No compression fractures, spondylolistheses, or spondylolysis defects.  Disk space heights preserved and facet alignment maintained.  Unremarkable SI joints and partially visualized hips.  No lytic or blastic lesions.  If back symptoms persist or progress, correlation with MRI may provide   additional diagnostic information with regard to potential disc material   herniation and/or nerve root compression.    HYPOKALEMIA   -repleted      Type 2 diabetes mellitus   -reports no longer taking insulin, just glipizide   -hold orals, place on ISS    Hypercholesteremia.    -c/w statin. 47 yo m with acute on chronic back pain/sciatica.  Pain control.  Neuro consulted, no need for MRI at this time - pain control and weight loss.  PM&R for pain control---------  PT recs rehab, pt refuses - for home PT.    Dispo: Patient is being transfered to [facility] in the care of Orthopedics Dr. Genao      -Neurology following  -No need for MRI of Lumbar spine at this time  -Optimize pain control  -Encourage weight optimization  -Continue with Physical Therapy as outpatient   -Physical Medicine and Rehab following    Recommend Medrol Dosepak, monitor BS  -Increase pregabalin to 50 mg TID  - Oxycodone   Ortho recs: Pain control  WBAT with assistive devices as needed  FU Labs/imaging  NPO pMN/IVF  Will need medical clearance prior to OR  Transfer to [facility] for L3-5 laminectomy/discectomy tomorrow     -MRI can be done as outpt if pain can be controlled ( pt had MRI in [place])  -PT recs to Rehab : Pt refusing : Home PT   -Xray of spine:X-ray Spine:6 lumbar morphology vertebrae noted which could be related to hypoplastic   12th ribs or a transitional lumbarized S1 segment with rudimentary disc   space.  No compression fractures, spondylolistheses, or spondylolysis defects.  Disk space heights preserved and facet alignment maintained.  Unremarkable SI joints and partially visualized hips.  No lytic or blastic lesions.  If back symptoms persist or progress, correlation with MRI may provide   additional diagnostic information with regard to potential disc material   herniation and/or nerve root compression.    HYPOKALEMIA   -repleted      Type 2 diabetes mellitus   -reports no longer taking insulin, just glipizide   -hold orals, place on ISS    Hypercholesteremia.    -c/w statin. 49 yo m with acute on chronic back pain/sciatica.  Pain control.  Neuro consulted, no need for MRI at this time - pain control and weight loss.  PM&R for pain control---------  PT recs rehab, pt refuses - for home PT.    Dispo: Patient is being transfered to [facility] in the care of Orthopedics Dr. Chadwick Jacques made aware of transfer       -Neurology following  -No need for MRI of Lumbar spine at this time  -Optimize pain control  -Encourage weight optimization  -Continue with Physical Therapy as outpatient   -Physical Medicine and Rehab following    Recommend Medrol Dosepak, monitor BS  -Increase pregabalin to 50 mg TID  - Oxycodone   Ortho recs: Pain control  WBAT with assistive devices as needed  FU Labs/imaging  NPO pMN/IVF  Will need medical clearance prior to OR  Transfer to [facility] for L3-5 laminectomy/discectomy tomorrow     -MRI can be done as outpt if pain can be controlled ( pt had MRI in [place])  -PT recs to Rehab : Pt refusing : Home PT   -Xray of spine:X-ray Spine:6 lumbar morphology vertebrae noted which could be related to hypoplastic   12th ribs or a transitional lumbarized S1 segment with rudimentary disc   space.  No compression fractures, spondylolistheses, or spondylolysis defects.  Disk space heights preserved and facet alignment maintained.  Unremarkable SI joints and partially visualized hips.  No lytic or blastic lesions.  If back symptoms persist or progress, correlation with MRI may provide   additional diagnostic information with regard to potential disc material   herniation and/or nerve root compression.    HYPOKALEMIA   -repleted      Type 2 diabetes mellitus   -reports no longer taking insulin, just glipizide   -hold orals, place on ISS    Hypercholesteremia.    -c/w statin.

## 2018-07-09 NOTE — CONSULT NOTE ADULT - SUBJECTIVE AND OBJECTIVE BOX
HPI:  47 y/o male with past medical history DM2, HTN, HLD, Multiple lumbar disc herniations (L3-L4/L4-L5/L5-S1) presents to the ED with complaint of worsening lower back pain. Patient states he was walking  2 nights ago when he felt worsening lower back pain described as "achy and sharp" originating in the right lower back region with radiation down the  thigh, posterior leg, and dorsum of the foot.  Patient rated the pain 10/10 in severity. Denies fevers, chills, incontinence, dysuria. Notes that baseline right leg numbness, weakness and pain  worsened but distribution remained same (right buttock, thigh, posterior leg, dorsum of foot). (05 Jul 2018 02:01)    MRI LS P.     REVIEW OF SYSTEMS: No chest pain, shortness of breath, nausea, vomiting or diarhea.      PAST MEDICAL & SURGICAL HISTORY  Hypercholesteremia  Diabetes mellitus  Abscess  H/O arthroscopy of right knee      SOCIAL HISTORY  Smoking - Denied, EtOH - Denied, Drugs - Denied    FUNCTIONAL HISTORY:   Lives   Independent    CURRENT FUNCTIONAL STATUS:      FAMILY HISTORY   Family history of heart failure  Family history of breast cancer (Aunt, Aunt)  Family history of hypertension (Grandparent)  Family history of cerebrovascular accident (CVA) (Grandparent)  Family history of diabetes mellitus (Sibling)  No pertinent family history in first degree relatives      RECENT LABS/IMAGING  CBC Full  -  ( 09 Jul 2018 07:55 )  WBC Count : 5.72 K/uL  Hemoglobin : 16.1 g/dL  Hematocrit : 47.0 %  Platelet Count - Automated : 201 K/uL  Mean Cell Volume : 87.0 fL  Mean Cell Hemoglobin : 29.8 pg  Mean Cell Hemoglobin Concentration : 34.3 %  Auto Neutrophil # : x  Auto Lymphocyte # : x  Auto Monocyte # : x  Auto Eosinophil # : x  Auto Basophil # : x  Auto Neutrophil % : x  Auto Lymphocyte % : x  Auto Monocyte % : x  Auto Eosinophil % : x  Auto Basophil % : x    07-09    140  |  100  |  16  ----------------------------<  141<H>  3.7   |  26  |  0.95    Ca    9.6      09 Jul 2018 07:55          VITALS  T(C): 36.7 (07-09-18 @ 05:24), Max: 36.8 (07-08-18 @ 12:54)  HR: 83 (07-09-18 @ 05:24) (83 - 89)  BP: 146/95 (07-09-18 @ 05:24) (126/92 - 146/95)  RR: 18 (07-09-18 @ 05:24) (17 - 18)  SpO2: 95% (07-09-18 @ 05:24) (95% - 99%)  Wt(kg): --    ALLERGIES  No Known Allergies  pork (Other)      MEDICATIONS   cyclobenzaprine 5 milliGRAM(s) Oral three times a day PRN  dextrose 40% Gel 15 Gram(s) Oral once PRN  dextrose 5%. 1000 milliLiter(s) IV Continuous <Continuous>  dextrose 50% Injectable 12.5 Gram(s) IV Push once  dextrose 50% Injectable 25 Gram(s) IV Push once  dextrose 50% Injectable 25 Gram(s) IV Push once  docusate sodium 100 milliGRAM(s) Oral three times a day  gabapentin 300 milliGRAM(s) Oral every 8 hours  glucagon  Injectable 1 milliGRAM(s) IntraMuscular once PRN  heparin  Injectable 5000 Unit(s) SubCutaneous every 12 hours  HYDROmorphone   Tablet 3 milliGRAM(s) Oral every 4 hours PRN  insulin lispro (HumaLOG) corrective regimen sliding scale   SubCutaneous three times a day before meals  insulin lispro (HumaLOG) corrective regimen sliding scale   SubCutaneous at bedtime  polyethylene glycol 3350 17 Gram(s) Oral daily  pregabalin 50 milliGRAM(s) Oral at bedtime  senna 2 Tablet(s) Oral at bedtime  simvastatin 20 milliGRAM(s) Oral at bedtime      ----------------------------------------------------------------------------------------  PHYSICAL EXAM  Constitutional - NAD, Comfortable  HEENT - NCAT, EOMI  Neck - Supple, No limited ROM  Chest - CTA bilaterally, No wheeze, No rhonchi, No crackles  Cardiovascular - RRR, S1S2, No murmurs  Abdomen - BS+, Soft, NTND  Extremities - No C/C/E, No calf tenderness   Neurologic Exam -                    Cognitive - Awake, Alert, AAO to self, place, date, year, situation     Communication - Fluent, No dysarthria, no aphasia     Cranial Nerves - CN 2-12 intact     Motor - No focal deficits                       Sensory - Intact to LT     Reflexes - DTR Intact, No primitive reflexive     Balance - WNL Static  Psychiatric - Mood stable, Affect WNL HPI:  47 y/o male with past medical history DM2, HTN, HLD, Multiple lumbar disc herniations (L3-L4/L4-L5/L5-S1) presents to the ED with complaint of worsening lower back pain. Patient states he was walking  2 nights ago when he felt worsening lower back pain described as "achy and sharp" originating in the right lower back region with radiation down the  thigh, posterior leg, and dorsum of the foot.  Patient rated the pain 10/10 in severity.  No bowel/bladder changes Sees pain management as outpt, treated only with meds, cannot name any but gabapentin. Never had PT/epidural.   MRI LS P.     REVIEW OF SYSTEMS: No chest pain, shortness of breath, nausea, vomiting or diarhea.      PAST MEDICAL & SURGICAL HISTORY  Hypercholesteremia  Diabetes mellitus  Abscess  H/O arthroscopy of right knee      SOCIAL HISTORY  Smoking - Denied, EtOH - Denied, Drugs - Denied          FAMILY HISTORY   Family history of heart failure  Family history of breast cancer (Aunt, Aunt)  Family history of hypertension (Grandparent)  Family history of cerebrovascular accident (CVA) (Grandparent)  Family history of diabetes mellitus (Sibling)  No pertinent family history in first degree relatives      RECENT LABS/IMAGING  CBC Full  -  ( 09 Jul 2018 07:55 )  WBC Count : 5.72 K/uL  Hemoglobin : 16.1 g/dL  Hematocrit : 47.0 %  Platelet Count - Automated : 201 K/uL  Mean Cell Volume : 87.0 fL  Mean Cell Hemoglobin : 29.8 pg  Mean Cell Hemoglobin Concentration : 34.3 %  Auto Neutrophil # : x  Auto Lymphocyte # : x  Auto Monocyte # : x  Auto Eosinophil # : x  Auto Basophil # : x  Auto Neutrophil % : x  Auto Lymphocyte % : x  Auto Monocyte % : x  Auto Eosinophil % : x  Auto Basophil % : x    07-09    140  |  100  |  16  ----------------------------<  141<H>  3.7   |  26  |  0.95    Ca    9.6      09 Jul 2018 07:55          VITALS  T(C): 36.7 (07-09-18 @ 05:24), Max: 36.8 (07-08-18 @ 12:54)  HR: 83 (07-09-18 @ 05:24) (83 - 89)  BP: 146/95 (07-09-18 @ 05:24) (126/92 - 146/95)  RR: 18 (07-09-18 @ 05:24) (17 - 18)  SpO2: 95% (07-09-18 @ 05:24) (95% - 99%)  Wt(kg): --    ALLERGIES  No Known Allergies  pork (Other)      MEDICATIONS   cyclobenzaprine 5 milliGRAM(s) Oral three times a day PRN  dextrose 40% Gel 15 Gram(s) Oral once PRN  dextrose 5%. 1000 milliLiter(s) IV Continuous <Continuous>  dextrose 50% Injectable 12.5 Gram(s) IV Push once  dextrose 50% Injectable 25 Gram(s) IV Push once  dextrose 50% Injectable 25 Gram(s) IV Push once  docusate sodium 100 milliGRAM(s) Oral three times a day  gabapentin 300 milliGRAM(s) Oral every 8 hours  glucagon  Injectable 1 milliGRAM(s) IntraMuscular once PRN  heparin  Injectable 5000 Unit(s) SubCutaneous every 12 hours  HYDROmorphone   Tablet 3 milliGRAM(s) Oral every 4 hours PRN  insulin lispro (HumaLOG) corrective regimen sliding scale   SubCutaneous three times a day before meals  insulin lispro (HumaLOG) corrective regimen sliding scale   SubCutaneous at bedtime  polyethylene glycol 3350 17 Gram(s) Oral daily  pregabalin 50 milliGRAM(s) Oral at bedtime  senna 2 Tablet(s) Oral at bedtime  simvastatin 20 milliGRAM(s) Oral at bedtime      ----------------------------------------------------------------------------------------  PHYSICAL EXAM  Constitutional - NAD, Comfortable  HEENT - NCAT, EOMI  Neck - Supple, No limited ROM  Chest - CTA bilaterally, No wheeze, No rhonchi, No crackles  Cardiovascular - RRR, S1S2, No murmurs  Abdomen - BS+, Soft, NTND  Extremities - No C/C/E, No calf tenderness   Neurologic Exam -                    Cognitive - Awake, Alert, AAO to self, place, date, year, situation     Communication - Fluent, No dysarthria, no aphasia     Cranial Nerves - CN 2-12 intact     Motor - unable to test RLE due to pain but moves antigravity                        Sensory - Decreased  to LT     Reflexes - DTR Intact, No primitive reflexive     Balance -not tested   Psychiatric - Mood stable, Affect WNL

## 2018-07-09 NOTE — DISCHARGE NOTE ADULT - CARE PROVIDERS DIRECT ADDRESSES
[email or web address]

## 2018-07-09 NOTE — DISCHARGE NOTE ADULT - CARE PROVIDER_API CALL
Francine Rodas (), PhysicalRehab Medicine  [address]  [address] [place] [address]  Phone: [phone]  Fax: [phone]    Giovanni Jacques (MD), Medicine  [address]  [address] [place] [address]  Phone: [phone]  Fax: [phone]

## 2018-07-09 NOTE — DISCHARGE NOTE ADULT - MEDICATION SUMMARY - MEDICATIONS TO TAKE
I will START or STAY ON the medications listed below when I get home from the hospital:    acetaminophen 325 mg oral tablet  -- 2 tab(s) by mouth every 8 hours  -- Indication: For Pain    oxyCODONE 15 mg oral tablet  -- 1 tab(s) by mouth every 4 hours, As needed, moderate and severe pain  -- Indication: For Pain    nabumetone 500 mg oral tablet  -- 2 tab(s) by mouth once a day  -- Indication: For Pain    heparin  -- 5000 unit(s) subcutaneous every 8 hours  -- Indication: For Prophylactic measure    Lyrica 50 mg oral capsule  -- 1 cap(s) by mouth once a day (at bedtime)  -- Indication: For Pain    pregabalin 50 mg oral capsule  -- 1 cap(s) by mouth 3 times a day  -- Indication: For nerve pain    glipiZIDE 10 mg oral tablet  -- 1 tab(s) by mouth 2 times a day, As Needed    taken at home not inpatient  -- Indication: For diabetes    atorvastatin 20 mg oral tablet  -- 1 tab(s) by mouth once a day  -- Indication: For Hypercholesteremia    simvastatin 20 mg oral tablet  -- 1 tab(s) by mouth once a day (at bedtime)  -- Indication: For Hypercholesteremia    lidocaine 5% topical film  -- Apply on skin to affected area once a day  -- Indication: For Pain    senna oral tablet  -- 2 tab(s) by mouth once a day (at bedtime)  -- Indication: For constipation    docusate sodium 100 mg oral capsule  -- 1 cap(s) by mouth 3 times a day  -- Indication: For constipation    polyethylene glycol 3350 oral powder for reconstitution  -- 17 gram(s) by mouth once a day  -- Indication: For constipation    cyclobenzaprine 10 mg oral tablet  -- 1 tab(s) by mouth 3 times a day, As needed, Muscle Spasm  -- Indication: For muscle relaxer I will START or STAY ON the medications listed below when I get home from the hospital:    acetaminophen 325 mg oral tablet  -- 2 tab(s) by mouth every 8 hours  -- Indication: For Pain    oxyCODONE 15 mg oral tablet  -- 1 tab(s) by mouth every 4 hours, As needed, moderate and severe pain  -- Indication: For Pain    nabumetone 500 mg oral tablet  -- 2 tab(s) by mouth once a day  -- Indication: For Pain    heparin  -- 5000 unit(s) subcutaneous every 8 hours  -- Indication: For Prophylactic measure    Lyrica 50 mg oral capsule  -- 1 cap(s) by mouth once a day (at bedtime)  -- Indication: For Pain    pregabalin 50 mg oral capsule  -- 1 cap(s) by mouth 3 times a day  -- Indication: For nerve pain    simvastatin 20 mg oral tablet  -- 1 tab(s) by mouth once a day (at bedtime)  -- Indication: For Hypercholesteremia    atorvastatin 20 mg oral tablet  -- 1 tab(s) by mouth once a day  -- Indication: For Hypercholesteremia    lidocaine 5% topical film  -- Apply on skin to affected area once a day  -- Indication: For Pain    senna oral tablet  -- 2 tab(s) by mouth once a day (at bedtime)  -- Indication: For constipation    docusate sodium 100 mg oral capsule  -- 1 cap(s) by mouth 3 times a day  -- Indication: For constipation    polyethylene glycol 3350 oral powder for reconstitution  -- 17 gram(s) by mouth once a day  -- Indication: For constipation    cyclobenzaprine 10 mg oral tablet  -- 1 tab(s) by mouth 3 times a day, As needed, Muscle Spasm  -- Indication: For muscle relaxer

## 2018-07-09 NOTE — DISCHARGE NOTE ADULT - SECONDARY DIAGNOSIS.
Type 2 diabetes mellitus with complication, unspecified whether long term insulin use Hypercholesteremia

## 2018-07-09 NOTE — DISCHARGE NOTE ADULT - PATIENT PORTAL LINK FT
You can access the [facility] Patient Portal, offered by [facility], by registering with the following website: [email or web address]

## 2018-07-09 NOTE — CONSULT NOTE ADULT - ASSESSMENT
49 y/o male with past medical history DM2, HTN, HLD, Multiple lumbar disc herniations (L3-L4/L4-L5/L5-S1) with acute worsening of lower back pain without any red flag signs. Neurological exam remarkable for normal strength in bilateral lower extremities and decreased sensation to light touch in the right anterolateral thigh, anterior and posterior leg, and dorsum of the foot; likely acute on chronic worsening of radicular lower back pain.     Recommendations:  [] No need for MRI of Lumbar spine at this time  [] Optimize pain control  [] Encourage weight optimization  [] Continue with Physical Therapy as outpatient
Lumbar radiculopathy  - Reccommend Medrol Dosepak, monitor BS  -Increase pregabalin to 50 mg TID  Would change Hydromorphone to Oxycodone unless particular reason to use Hydromorphone.   MRI can be done as outpt if pain can be controlled ( pt had MRI in [place])  Will need PT- explained to pt

## 2018-07-10 LAB
GLUCOSE BLDC GLUCOMTR-MCNC: 133 MG/DL — HIGH (ref 70–99)
GLUCOSE BLDC GLUCOMTR-MCNC: 136 MG/DL — HIGH (ref 70–99)
GLUCOSE BLDC GLUCOMTR-MCNC: 217 MG/DL — HIGH (ref 70–99)
GLUCOSE BLDC GLUCOMTR-MCNC: 251 MG/DL — HIGH (ref 70–99)

## 2018-07-10 RX ORDER — OXYCODONE HYDROCHLORIDE 5 MG/1
15 TABLET ORAL EVERY 4 HOURS
Qty: 0 | Refills: 0 | Status: DISCONTINUED | OUTPATIENT
Start: 2018-07-10 | End: 2018-07-12

## 2018-07-10 RX ADMIN — HEPARIN SODIUM 5000 UNIT(S): 5000 INJECTION INTRAVENOUS; SUBCUTANEOUS at 18:03

## 2018-07-10 RX ADMIN — POLYETHYLENE GLYCOL 3350 17 GRAM(S): 17 POWDER, FOR SOLUTION ORAL at 13:59

## 2018-07-10 RX ADMIN — SENNA PLUS 2 TABLET(S): 8.6 TABLET ORAL at 21:36

## 2018-07-10 RX ADMIN — Medication 50 MILLIGRAM(S): at 18:03

## 2018-07-10 RX ADMIN — OXYCODONE HYDROCHLORIDE 15 MILLIGRAM(S): 5 TABLET ORAL at 21:36

## 2018-07-10 RX ADMIN — Medication 2: at 21:36

## 2018-07-10 RX ADMIN — GABAPENTIN 300 MILLIGRAM(S): 400 CAPSULE ORAL at 06:32

## 2018-07-10 RX ADMIN — Medication 100 MILLIGRAM(S): at 06:32

## 2018-07-10 RX ADMIN — GABAPENTIN 300 MILLIGRAM(S): 400 CAPSULE ORAL at 21:36

## 2018-07-10 RX ADMIN — GABAPENTIN 300 MILLIGRAM(S): 400 CAPSULE ORAL at 13:59

## 2018-07-10 RX ADMIN — SIMVASTATIN 20 MILLIGRAM(S): 20 TABLET, FILM COATED ORAL at 21:36

## 2018-07-10 RX ADMIN — OXYCODONE HYDROCHLORIDE 15 MILLIGRAM(S): 5 TABLET ORAL at 13:20

## 2018-07-10 RX ADMIN — HEPARIN SODIUM 5000 UNIT(S): 5000 INJECTION INTRAVENOUS; SUBCUTANEOUS at 06:31

## 2018-07-10 RX ADMIN — OXYCODONE HYDROCHLORIDE 15 MILLIGRAM(S): 5 TABLET ORAL at 22:12

## 2018-07-10 RX ADMIN — Medication 50 MILLIGRAM(S): at 09:23

## 2018-07-10 RX ADMIN — HYDROMORPHONE HYDROCHLORIDE 3 MILLIGRAM(S): 2 INJECTION INTRAMUSCULAR; INTRAVENOUS; SUBCUTANEOUS at 06:31

## 2018-07-10 RX ADMIN — HYDROMORPHONE HYDROCHLORIDE 3 MILLIGRAM(S): 2 INJECTION INTRAMUSCULAR; INTRAVENOUS; SUBCUTANEOUS at 07:00

## 2018-07-10 RX ADMIN — Medication 24 MILLIGRAM(S): at 10:59

## 2018-07-10 RX ADMIN — Medication 50 MILLIGRAM(S): at 21:36

## 2018-07-10 RX ADMIN — OXYCODONE HYDROCHLORIDE 15 MILLIGRAM(S): 5 TABLET ORAL at 12:31

## 2018-07-10 RX ADMIN — Medication 100 MILLIGRAM(S): at 13:59

## 2018-07-10 RX ADMIN — Medication 100 MILLIGRAM(S): at 21:36

## 2018-07-11 LAB
GLUCOSE BLDC GLUCOMTR-MCNC: 136 MG/DL — HIGH (ref 70–99)
GLUCOSE BLDC GLUCOMTR-MCNC: 151 MG/DL — HIGH (ref 70–99)
GLUCOSE BLDC GLUCOMTR-MCNC: 160 MG/DL — HIGH (ref 70–99)
GLUCOSE BLDC GLUCOMTR-MCNC: 163 MG/DL — HIGH (ref 70–99)

## 2018-07-11 PROCEDURE — 99232 SBSQ HOSP IP/OBS MODERATE 35: CPT | Mod: GC

## 2018-07-11 PROCEDURE — 72100 X-RAY EXAM L-S SPINE 2/3 VWS: CPT | Mod: 26

## 2018-07-11 RX ADMIN — OXYCODONE HYDROCHLORIDE 15 MILLIGRAM(S): 5 TABLET ORAL at 12:30

## 2018-07-11 RX ADMIN — OXYCODONE HYDROCHLORIDE 15 MILLIGRAM(S): 5 TABLET ORAL at 18:28

## 2018-07-11 RX ADMIN — OXYCODONE HYDROCHLORIDE 15 MILLIGRAM(S): 5 TABLET ORAL at 09:06

## 2018-07-11 RX ADMIN — Medication 100 MILLIGRAM(S): at 06:23

## 2018-07-11 RX ADMIN — Medication 4 MILLIGRAM(S): at 19:25

## 2018-07-11 RX ADMIN — Medication 2: at 12:25

## 2018-07-11 RX ADMIN — OXYCODONE HYDROCHLORIDE 15 MILLIGRAM(S): 5 TABLET ORAL at 17:28

## 2018-07-11 RX ADMIN — OXYCODONE HYDROCHLORIDE 15 MILLIGRAM(S): 5 TABLET ORAL at 13:30

## 2018-07-11 RX ADMIN — GABAPENTIN 300 MILLIGRAM(S): 400 CAPSULE ORAL at 14:51

## 2018-07-11 RX ADMIN — OXYCODONE HYDROCHLORIDE 15 MILLIGRAM(S): 5 TABLET ORAL at 22:26

## 2018-07-11 RX ADMIN — GABAPENTIN 300 MILLIGRAM(S): 400 CAPSULE ORAL at 22:27

## 2018-07-11 RX ADMIN — POLYETHYLENE GLYCOL 3350 17 GRAM(S): 17 POWDER, FOR SOLUTION ORAL at 14:51

## 2018-07-11 RX ADMIN — Medication 50 MILLIGRAM(S): at 14:52

## 2018-07-11 RX ADMIN — Medication 100 MILLIGRAM(S): at 14:51

## 2018-07-11 RX ADMIN — Medication 50 MILLIGRAM(S): at 06:23

## 2018-07-11 RX ADMIN — OXYCODONE HYDROCHLORIDE 15 MILLIGRAM(S): 5 TABLET ORAL at 08:06

## 2018-07-11 RX ADMIN — Medication 4 MILLIGRAM(S): at 06:23

## 2018-07-11 RX ADMIN — Medication 50 MILLIGRAM(S): at 23:12

## 2018-07-11 RX ADMIN — SENNA PLUS 2 TABLET(S): 8.6 TABLET ORAL at 22:27

## 2018-07-11 RX ADMIN — GABAPENTIN 300 MILLIGRAM(S): 400 CAPSULE ORAL at 06:23

## 2018-07-11 RX ADMIN — OXYCODONE HYDROCHLORIDE 15 MILLIGRAM(S): 5 TABLET ORAL at 23:00

## 2018-07-11 RX ADMIN — HEPARIN SODIUM 5000 UNIT(S): 5000 INJECTION INTRAVENOUS; SUBCUTANEOUS at 17:28

## 2018-07-11 RX ADMIN — SIMVASTATIN 20 MILLIGRAM(S): 20 TABLET, FILM COATED ORAL at 22:26

## 2018-07-11 RX ADMIN — Medication 4 MILLIGRAM(S): at 14:51

## 2018-07-11 RX ADMIN — HEPARIN SODIUM 5000 UNIT(S): 5000 INJECTION INTRAVENOUS; SUBCUTANEOUS at 06:23

## 2018-07-11 RX ADMIN — Medication 100 MILLIGRAM(S): at 22:26

## 2018-07-11 RX ADMIN — Medication 8 MILLIGRAM(S): at 23:12

## 2018-07-12 LAB
BUN SERPL-MCNC: 18 MG/DL — SIGNIFICANT CHANGE UP (ref 7–23)
CALCIUM SERPL-MCNC: 10.1 MG/DL — SIGNIFICANT CHANGE UP (ref 8.4–10.5)
CHLORIDE SERPL-SCNC: 96 MMOL/L — LOW (ref 98–107)
CO2 SERPL-SCNC: 28 MMOL/L — SIGNIFICANT CHANGE UP (ref 22–31)
CREAT SERPL-MCNC: 1.04 MG/DL — SIGNIFICANT CHANGE UP (ref 0.5–1.3)
GLUCOSE BLDC GLUCOMTR-MCNC: 121 MG/DL — HIGH (ref 70–99)
GLUCOSE BLDC GLUCOMTR-MCNC: 133 MG/DL — HIGH (ref 70–99)
GLUCOSE BLDC GLUCOMTR-MCNC: 176 MG/DL — HIGH (ref 70–99)
GLUCOSE BLDC GLUCOMTR-MCNC: 196 MG/DL — HIGH (ref 70–99)
GLUCOSE SERPL-MCNC: 150 MG/DL — HIGH (ref 70–99)
HCT VFR BLD CALC: 49.8 % — SIGNIFICANT CHANGE UP (ref 39–50)
HGB BLD-MCNC: 16.9 G/DL — SIGNIFICANT CHANGE UP (ref 13–17)
MCHC RBC-ENTMCNC: 29.6 PG — SIGNIFICANT CHANGE UP (ref 27–34)
MCHC RBC-ENTMCNC: 33.9 % — SIGNIFICANT CHANGE UP (ref 32–36)
MCV RBC AUTO: 87.2 FL — SIGNIFICANT CHANGE UP (ref 80–100)
NRBC # FLD: 0 — SIGNIFICANT CHANGE UP
PLATELET # BLD AUTO: 240 K/UL — SIGNIFICANT CHANGE UP (ref 150–400)
PMV BLD: 11.1 FL — SIGNIFICANT CHANGE UP (ref 7–13)
POTASSIUM SERPL-MCNC: 4.5 MMOL/L — SIGNIFICANT CHANGE UP (ref 3.5–5.3)
POTASSIUM SERPL-SCNC: 4.5 MMOL/L — SIGNIFICANT CHANGE UP (ref 3.5–5.3)
RBC # BLD: 5.71 M/UL — SIGNIFICANT CHANGE UP (ref 4.2–5.8)
RBC # FLD: 12.1 % — SIGNIFICANT CHANGE UP (ref 10.3–14.5)
SODIUM SERPL-SCNC: 138 MMOL/L — SIGNIFICANT CHANGE UP (ref 135–145)
WBC # BLD: 7.67 K/UL — SIGNIFICANT CHANGE UP (ref 3.8–10.5)
WBC # FLD AUTO: 7.67 K/UL — SIGNIFICANT CHANGE UP (ref 3.8–10.5)

## 2018-07-12 RX ORDER — OXYCODONE HYDROCHLORIDE 5 MG/1
15 TABLET ORAL EVERY 4 HOURS
Qty: 0 | Refills: 0 | Status: DISCONTINUED | OUTPATIENT
Start: 2018-07-12 | End: 2018-07-15

## 2018-07-12 RX ADMIN — Medication 100 MILLIGRAM(S): at 22:13

## 2018-07-12 RX ADMIN — OXYCODONE HYDROCHLORIDE 15 MILLIGRAM(S): 5 TABLET ORAL at 22:14

## 2018-07-12 RX ADMIN — SENNA PLUS 2 TABLET(S): 8.6 TABLET ORAL at 22:13

## 2018-07-12 RX ADMIN — OXYCODONE HYDROCHLORIDE 15 MILLIGRAM(S): 5 TABLET ORAL at 12:35

## 2018-07-12 RX ADMIN — Medication 4 MILLIGRAM(S): at 13:51

## 2018-07-12 RX ADMIN — OXYCODONE HYDROCHLORIDE 15 MILLIGRAM(S): 5 TABLET ORAL at 05:42

## 2018-07-12 RX ADMIN — Medication 50 MILLIGRAM(S): at 08:00

## 2018-07-12 RX ADMIN — Medication 4 MILLIGRAM(S): at 18:33

## 2018-07-12 RX ADMIN — Medication 4 MILLIGRAM(S): at 22:13

## 2018-07-12 RX ADMIN — OXYCODONE HYDROCHLORIDE 15 MILLIGRAM(S): 5 TABLET ORAL at 22:45

## 2018-07-12 RX ADMIN — HEPARIN SODIUM 5000 UNIT(S): 5000 INJECTION INTRAVENOUS; SUBCUTANEOUS at 05:25

## 2018-07-12 RX ADMIN — Medication 100 MILLIGRAM(S): at 05:25

## 2018-07-12 RX ADMIN — OXYCODONE HYDROCHLORIDE 15 MILLIGRAM(S): 5 TABLET ORAL at 11:35

## 2018-07-12 RX ADMIN — HEPARIN SODIUM 5000 UNIT(S): 5000 INJECTION INTRAVENOUS; SUBCUTANEOUS at 18:33

## 2018-07-12 RX ADMIN — GABAPENTIN 300 MILLIGRAM(S): 400 CAPSULE ORAL at 05:25

## 2018-07-12 RX ADMIN — Medication 100 MILLIGRAM(S): at 13:51

## 2018-07-12 RX ADMIN — Medication 4 MILLIGRAM(S): at 08:00

## 2018-07-12 RX ADMIN — Medication 50 MILLIGRAM(S): at 22:14

## 2018-07-12 RX ADMIN — SIMVASTATIN 20 MILLIGRAM(S): 20 TABLET, FILM COATED ORAL at 22:14

## 2018-07-12 RX ADMIN — OXYCODONE HYDROCHLORIDE 15 MILLIGRAM(S): 5 TABLET ORAL at 06:20

## 2018-07-13 LAB
BUN SERPL-MCNC: 17 MG/DL — SIGNIFICANT CHANGE UP (ref 7–23)
CALCIUM SERPL-MCNC: 9.9 MG/DL — SIGNIFICANT CHANGE UP (ref 8.4–10.5)
CHLORIDE SERPL-SCNC: 94 MMOL/L — LOW (ref 98–107)
CO2 SERPL-SCNC: 30 MMOL/L — SIGNIFICANT CHANGE UP (ref 22–31)
CREAT SERPL-MCNC: 1.05 MG/DL — SIGNIFICANT CHANGE UP (ref 0.5–1.3)
GLUCOSE BLDC GLUCOMTR-MCNC: 139 MG/DL — HIGH (ref 70–99)
GLUCOSE BLDC GLUCOMTR-MCNC: 149 MG/DL — HIGH (ref 70–99)
GLUCOSE BLDC GLUCOMTR-MCNC: 189 MG/DL — HIGH (ref 70–99)
GLUCOSE BLDC GLUCOMTR-MCNC: 205 MG/DL — HIGH (ref 70–99)
GLUCOSE SERPL-MCNC: 162 MG/DL — HIGH (ref 70–99)
HCT VFR BLD CALC: 49.8 % — SIGNIFICANT CHANGE UP (ref 39–50)
HGB BLD-MCNC: 16.6 G/DL — SIGNIFICANT CHANGE UP (ref 13–17)
MCHC RBC-ENTMCNC: 29.5 PG — SIGNIFICANT CHANGE UP (ref 27–34)
MCHC RBC-ENTMCNC: 33.3 % — SIGNIFICANT CHANGE UP (ref 32–36)
MCV RBC AUTO: 88.6 FL — SIGNIFICANT CHANGE UP (ref 80–100)
NRBC # FLD: 0 — SIGNIFICANT CHANGE UP
PLATELET # BLD AUTO: 239 K/UL — SIGNIFICANT CHANGE UP (ref 150–400)
PMV BLD: 11 FL — SIGNIFICANT CHANGE UP (ref 7–13)
POTASSIUM SERPL-MCNC: 4 MMOL/L — SIGNIFICANT CHANGE UP (ref 3.5–5.3)
POTASSIUM SERPL-SCNC: 4 MMOL/L — SIGNIFICANT CHANGE UP (ref 3.5–5.3)
RBC # BLD: 5.62 M/UL — SIGNIFICANT CHANGE UP (ref 4.2–5.8)
RBC # FLD: 12 % — SIGNIFICANT CHANGE UP (ref 10.3–14.5)
SODIUM SERPL-SCNC: 138 MMOL/L — SIGNIFICANT CHANGE UP (ref 135–145)
WBC # BLD: 7.28 K/UL — SIGNIFICANT CHANGE UP (ref 3.8–10.5)
WBC # FLD AUTO: 7.28 K/UL — SIGNIFICANT CHANGE UP (ref 3.8–10.5)

## 2018-07-13 PROCEDURE — 72148 MRI LUMBAR SPINE W/O DYE: CPT | Mod: 26

## 2018-07-13 RX ORDER — ACETAMINOPHEN 500 MG
650 TABLET ORAL EVERY 8 HOURS
Qty: 0 | Refills: 0 | Status: DISCONTINUED | OUTPATIENT
Start: 2018-07-13 | End: 2018-07-15

## 2018-07-13 RX ORDER — CYCLOBENZAPRINE HYDROCHLORIDE 10 MG/1
10 TABLET, FILM COATED ORAL THREE TIMES A DAY
Qty: 0 | Refills: 0 | Status: DISCONTINUED | OUTPATIENT
Start: 2018-07-13 | End: 2018-07-15

## 2018-07-13 RX ORDER — ACETAMINOPHEN 500 MG
1000 TABLET ORAL ONCE
Qty: 0 | Refills: 0 | Status: DISCONTINUED | OUTPATIENT
Start: 2018-07-13 | End: 2018-07-15

## 2018-07-13 RX ORDER — LIDOCAINE 4 G/100G
1 CREAM TOPICAL DAILY
Qty: 0 | Refills: 0 | Status: DISCONTINUED | OUTPATIENT
Start: 2018-07-13 | End: 2018-07-15

## 2018-07-13 RX ORDER — CYCLOBENZAPRINE HYDROCHLORIDE 10 MG/1
10 TABLET, FILM COATED ORAL ONCE
Qty: 0 | Refills: 0 | Status: COMPLETED | OUTPATIENT
Start: 2018-07-13 | End: 2018-07-13

## 2018-07-13 RX ADMIN — SIMVASTATIN 20 MILLIGRAM(S): 20 TABLET, FILM COATED ORAL at 21:54

## 2018-07-13 RX ADMIN — Medication 100 MILLIGRAM(S): at 21:54

## 2018-07-13 RX ADMIN — OXYCODONE HYDROCHLORIDE 15 MILLIGRAM(S): 5 TABLET ORAL at 21:54

## 2018-07-13 RX ADMIN — HEPARIN SODIUM 5000 UNIT(S): 5000 INJECTION INTRAVENOUS; SUBCUTANEOUS at 07:39

## 2018-07-13 RX ADMIN — OXYCODONE HYDROCHLORIDE 15 MILLIGRAM(S): 5 TABLET ORAL at 11:30

## 2018-07-13 RX ADMIN — Medication 100 MILLIGRAM(S): at 14:27

## 2018-07-13 RX ADMIN — Medication 100 MILLIGRAM(S): at 07:39

## 2018-07-13 RX ADMIN — Medication 4 MILLIGRAM(S): at 21:53

## 2018-07-13 RX ADMIN — Medication 4 MILLIGRAM(S): at 14:28

## 2018-07-13 RX ADMIN — Medication 50 MILLIGRAM(S): at 14:26

## 2018-07-13 RX ADMIN — POLYETHYLENE GLYCOL 3350 17 GRAM(S): 17 POWDER, FOR SOLUTION ORAL at 14:27

## 2018-07-13 RX ADMIN — Medication 50 MILLIGRAM(S): at 07:39

## 2018-07-13 RX ADMIN — Medication 50 MILLIGRAM(S): at 21:54

## 2018-07-13 RX ADMIN — OXYCODONE HYDROCHLORIDE 15 MILLIGRAM(S): 5 TABLET ORAL at 22:30

## 2018-07-13 RX ADMIN — HEPARIN SODIUM 5000 UNIT(S): 5000 INJECTION INTRAVENOUS; SUBCUTANEOUS at 18:03

## 2018-07-13 RX ADMIN — OXYCODONE HYDROCHLORIDE 15 MILLIGRAM(S): 5 TABLET ORAL at 10:49

## 2018-07-13 RX ADMIN — Medication 2: at 18:02

## 2018-07-13 RX ADMIN — SENNA PLUS 2 TABLET(S): 8.6 TABLET ORAL at 21:54

## 2018-07-13 RX ADMIN — LIDOCAINE 1 PATCH: 4 CREAM TOPICAL at 18:35

## 2018-07-13 RX ADMIN — CYCLOBENZAPRINE HYDROCHLORIDE 10 MILLIGRAM(S): 10 TABLET, FILM COATED ORAL at 18:35

## 2018-07-13 RX ADMIN — Medication 4 MILLIGRAM(S): at 08:23

## 2018-07-13 NOTE — DIETITIAN INITIAL EVALUATION ADULT. - OTHER INFO
Pt seen for Length of stay. Pt obese 49 yo male with acute on chronic back pain/sciatica. Pt appears alert, oriented. Per Pt his appetite varies; sometimes good, sometimes not. Per Pt he gained weight: ~30# since 01/02018. No report of chewing/swallowing difficulties; no report of nausea/vomiting/diarrhea @ present. But Pt C/O constipation. Pt C/O pain @ time of visit. Pt's diet order includes Consistent Carbohydrate restriction. Unable to provide diet education on prescribed diet. At home Pt tries to avoid Regular sugar reported. Of note Pt's HbA1c level 6.6% (7/9). RDN remains available, Pt made aware.

## 2018-07-13 NOTE — DIETITIAN INITIAL EVALUATION ADULT. - PROBLEM SELECTOR PLAN 3
-reports no longer taking insulin, just glipizide   -hold orals, place on ISS  -will email pharmacy for complete med rec

## 2018-07-13 NOTE — DIETITIAN INITIAL EVALUATION ADULT. - NS AS NUTRI INTERV MEALS SNACK
Diets modified for specific foods and ingredients/Other (specify)/1. Suggest: PO diet order: Consistent Carbohydrate, DASH/TLC (cholesterol and sodium restricted), Low Fat;           2. Encourage & assist Pt with meals; Monitor PO diet tolerance; Honor food preferences;                  3. Monitor labs, weights, hydration status;

## 2018-07-14 ENCOUNTER — TRANSCRIPTION ENCOUNTER (OUTPATIENT)
Age: 48
End: 2018-07-14

## 2018-07-14 LAB
BUN SERPL-MCNC: 18 MG/DL — SIGNIFICANT CHANGE UP (ref 7–23)
CALCIUM SERPL-MCNC: 9.6 MG/DL — SIGNIFICANT CHANGE UP (ref 8.4–10.5)
CHLORIDE SERPL-SCNC: 95 MMOL/L — LOW (ref 98–107)
CO2 SERPL-SCNC: 29 MMOL/L — SIGNIFICANT CHANGE UP (ref 22–31)
CREAT SERPL-MCNC: 1.07 MG/DL — SIGNIFICANT CHANGE UP (ref 0.5–1.3)
GLUCOSE BLDC GLUCOMTR-MCNC: 136 MG/DL — HIGH (ref 70–99)
GLUCOSE BLDC GLUCOMTR-MCNC: 159 MG/DL — HIGH (ref 70–99)
GLUCOSE BLDC GLUCOMTR-MCNC: 169 MG/DL — HIGH (ref 70–99)
GLUCOSE BLDC GLUCOMTR-MCNC: 175 MG/DL — HIGH (ref 70–99)
GLUCOSE BLDC GLUCOMTR-MCNC: 216 MG/DL — HIGH (ref 70–99)
GLUCOSE SERPL-MCNC: 198 MG/DL — HIGH (ref 70–99)
HCT VFR BLD CALC: 51.9 % — HIGH (ref 39–50)
HGB BLD-MCNC: 17.1 G/DL — HIGH (ref 13–17)
MCHC RBC-ENTMCNC: 30.1 PG — SIGNIFICANT CHANGE UP (ref 27–34)
MCHC RBC-ENTMCNC: 32.9 % — SIGNIFICANT CHANGE UP (ref 32–36)
MCV RBC AUTO: 91.2 FL — SIGNIFICANT CHANGE UP (ref 80–100)
NRBC # FLD: 0 — SIGNIFICANT CHANGE UP
PLATELET # BLD AUTO: 237 K/UL — SIGNIFICANT CHANGE UP (ref 150–400)
PMV BLD: 11.3 FL — SIGNIFICANT CHANGE UP (ref 7–13)
POTASSIUM SERPL-MCNC: 3.9 MMOL/L — SIGNIFICANT CHANGE UP (ref 3.5–5.3)
POTASSIUM SERPL-SCNC: 3.9 MMOL/L — SIGNIFICANT CHANGE UP (ref 3.5–5.3)
RBC # BLD: 5.69 M/UL — SIGNIFICANT CHANGE UP (ref 4.2–5.8)
RBC # FLD: 12.1 % — SIGNIFICANT CHANGE UP (ref 10.3–14.5)
SODIUM SERPL-SCNC: 137 MMOL/L — SIGNIFICANT CHANGE UP (ref 135–145)
WBC # BLD: 6.88 K/UL — SIGNIFICANT CHANGE UP (ref 3.8–10.5)
WBC # FLD AUTO: 6.88 K/UL — SIGNIFICANT CHANGE UP (ref 3.8–10.5)

## 2018-07-14 RX ORDER — DOCUSATE SODIUM 100 MG
1 CAPSULE ORAL
Qty: 0 | Refills: 0 | DISCHARGE
Start: 2018-07-14

## 2018-07-14 RX ORDER — HEPARIN SODIUM 5000 [USP'U]/ML
5000 INJECTION INTRAVENOUS; SUBCUTANEOUS EVERY 8 HOURS
Qty: 0 | Refills: 0 | Status: DISCONTINUED | OUTPATIENT
Start: 2018-07-14 | End: 2018-07-15

## 2018-07-14 RX ORDER — CYCLOBENZAPRINE HYDROCHLORIDE 10 MG/1
1 TABLET, FILM COATED ORAL
Qty: 0 | Refills: 0 | DISCHARGE
Start: 2018-07-14

## 2018-07-14 RX ORDER — OXYCODONE HYDROCHLORIDE 5 MG/1
1 TABLET ORAL
Qty: 0 | Refills: 0 | COMMUNITY
Start: 2018-07-14

## 2018-07-14 RX ORDER — ACETAMINOPHEN 500 MG
2 TABLET ORAL
Qty: 0 | Refills: 0 | DISCHARGE
Start: 2018-07-14

## 2018-07-14 RX ORDER — HEPARIN SODIUM 5000 [USP'U]/ML
0 INJECTION INTRAVENOUS; SUBCUTANEOUS
Qty: 0 | Refills: 0 | COMMUNITY
Start: 2018-07-14

## 2018-07-14 RX ORDER — DEXTROSE 50 % IN WATER 50 %
25 SYRINGE (ML) INTRAVENOUS
Qty: 0 | Refills: 0 | COMMUNITY
Start: 2018-07-14

## 2018-07-14 RX ORDER — SIMVASTATIN 20 MG/1
1 TABLET, FILM COATED ORAL
Qty: 0 | Refills: 0 | DISCHARGE
Start: 2018-07-14

## 2018-07-14 RX ORDER — SODIUM CHLORIDE 9 MG/ML
1000 INJECTION, SOLUTION INTRAVENOUS
Qty: 0 | Refills: 0 | COMMUNITY
Start: 2018-07-14

## 2018-07-14 RX ORDER — SENNA PLUS 8.6 MG/1
2 TABLET ORAL
Qty: 0 | Refills: 0 | DISCHARGE
Start: 2018-07-14

## 2018-07-14 RX ORDER — HEPARIN SODIUM 5000 [USP'U]/ML
5000 INJECTION INTRAVENOUS; SUBCUTANEOUS
Qty: 0 | Refills: 0 | COMMUNITY
Start: 2018-07-14

## 2018-07-14 RX ORDER — DEXTROSE 50 % IN WATER 50 %
50 SYRINGE (ML) INTRAVENOUS
Qty: 0 | Refills: 0 | COMMUNITY
Start: 2018-07-14

## 2018-07-14 RX ORDER — ACETAMINOPHEN 500 MG
100 TABLET ORAL
Qty: 0 | Refills: 0 | COMMUNITY
Start: 2018-07-14

## 2018-07-14 RX ORDER — LIDOCAINE 4 G/100G
1 CREAM TOPICAL
Qty: 0 | Refills: 0 | DISCHARGE
Start: 2018-07-14

## 2018-07-14 RX ORDER — LIDOCAINE 4 G/100G
0 CREAM TOPICAL
Qty: 0 | Refills: 0 | COMMUNITY
Start: 2018-07-14

## 2018-07-14 RX ORDER — POLYETHYLENE GLYCOL 3350 17 G/17G
17 POWDER, FOR SOLUTION ORAL
Qty: 0 | Refills: 0 | DISCHARGE
Start: 2018-07-14

## 2018-07-14 RX ADMIN — Medication 2: at 08:46

## 2018-07-14 RX ADMIN — Medication 50 MILLIGRAM(S): at 22:51

## 2018-07-14 RX ADMIN — Medication 4 MILLIGRAM(S): at 07:05

## 2018-07-14 RX ADMIN — HEPARIN SODIUM 5000 UNIT(S): 5000 INJECTION INTRAVENOUS; SUBCUTANEOUS at 07:04

## 2018-07-14 RX ADMIN — Medication 100 MILLIGRAM(S): at 13:13

## 2018-07-14 RX ADMIN — SENNA PLUS 2 TABLET(S): 8.6 TABLET ORAL at 22:52

## 2018-07-14 RX ADMIN — Medication 50 MILLIGRAM(S): at 07:04

## 2018-07-14 RX ADMIN — Medication 100 MILLIGRAM(S): at 07:04

## 2018-07-14 RX ADMIN — SIMVASTATIN 20 MILLIGRAM(S): 20 TABLET, FILM COATED ORAL at 22:51

## 2018-07-14 RX ADMIN — OXYCODONE HYDROCHLORIDE 15 MILLIGRAM(S): 5 TABLET ORAL at 19:30

## 2018-07-14 RX ADMIN — Medication 100 MILLIGRAM(S): at 22:51

## 2018-07-14 RX ADMIN — LIDOCAINE 1 PATCH: 4 CREAM TOPICAL at 07:00

## 2018-07-14 RX ADMIN — OXYCODONE HYDROCHLORIDE 15 MILLIGRAM(S): 5 TABLET ORAL at 18:00

## 2018-07-14 RX ADMIN — OXYCODONE HYDROCHLORIDE 15 MILLIGRAM(S): 5 TABLET ORAL at 22:52

## 2018-07-14 RX ADMIN — Medication 4 MILLIGRAM(S): at 22:54

## 2018-07-14 RX ADMIN — OXYCODONE HYDROCHLORIDE 15 MILLIGRAM(S): 5 TABLET ORAL at 23:33

## 2018-07-14 RX ADMIN — Medication 50 MILLIGRAM(S): at 13:12

## 2018-07-14 RX ADMIN — HEPARIN SODIUM 5000 UNIT(S): 5000 INJECTION INTRAVENOUS; SUBCUTANEOUS at 22:51

## 2018-07-14 RX ADMIN — OXYCODONE HYDROCHLORIDE 15 MILLIGRAM(S): 5 TABLET ORAL at 13:10

## 2018-07-14 RX ADMIN — Medication 4: at 12:14

## 2018-07-14 RX ADMIN — OXYCODONE HYDROCHLORIDE 15 MILLIGRAM(S): 5 TABLET ORAL at 12:14

## 2018-07-14 RX ADMIN — POLYETHYLENE GLYCOL 3350 17 GRAM(S): 17 POWDER, FOR SOLUTION ORAL at 13:12

## 2018-07-14 RX ADMIN — LIDOCAINE 1 PATCH: 4 CREAM TOPICAL at 13:12

## 2018-07-14 NOTE — PROGRESS NOTE ADULT - ASSESSMENT
47 yo m with acute on chronic back pain/sciatica     Problem/Plan - 1:  ·  Problem: Acute right-sided low back pain with right-sided sciatica.  Plan: -seen by neurology, who recommend no need for MRI  but ortho requesting    -symptoms stemming from worsening of  known radiculopathy   - PM&R appreciated, c/w medrol dosepack, pregabalin, Oxy  -encourage weight loss  -PT  - L spine Xray reviewed, Ortho eval      Problem/Plan - 2:  ·  Problem: Hypokalemia.  Plan: - repleted  recheck      Problem/Plan - 3:  ·  Problem: Type 2 diabetes mellitus with complication, unspecified whether long term insulin use.  Plan: -reports no longer taking insulin, just glipizide   -hold orals, place on ISS     Problem/Plan - 4:  ·  Problem: Hypercholesteremia.  Plan: c/w statin.      Problem/Plan - 5:  ·  Problem: DVT prophylaxis    D/c planning when pain controlled
47 yo m with acute on chronic back pain/sciatica     Problem/Plan - 1:  ·  Problem: Acute right-sided low back pain with right-sided sciatica.  Plan: -seen by neurology, who recommend no need for MRI   -symptoms stemming from worsening of  known radiculopathy   - PM&R appreciated, c/w medrol dosepack, pregabalin increased  -encourage weight loss  -PT     Problem/Plan - 2:  ·  Problem: Hypokalemia.  Plan: - repleted     Problem/Plan - 3:  ·  Problem: Type 2 diabetes mellitus with complication, unspecified whether long term insulin use.  Plan: -reports no longer taking insulin, just glipizide   -hold orals, place on ISS     Problem/Plan - 4:  ·  Problem: Hypercholesteremia.  Plan: c/w statin.      Problem/Plan - 5:  ·  Problem: DVT prophylaxis    D/c planning
47 yo m with acute on chronic back pain/sciatica     Problem/Plan - 1:  ·  Problem: Acute right-sided low back pain with right-sided sciatica.  Plan: -seen by neurology, who recommend no need for MRI   -symptoms stemming from worsening of  known radiculopathy   - PM&R appreciated, c/w medrol dosepack, pregabalin increased  -encourage weight loss  -PT  - Ortho eval in view of persistent pain     Problem/Plan - 2:  ·  Problem: Hypokalemia.  Plan: - repleted     Problem/Plan - 3:  ·  Problem: Type 2 diabetes mellitus with complication, unspecified whether long term insulin use.  Plan: -reports no longer taking insulin, just glipizide   -hold orals, place on ISS     Problem/Plan - 4:  ·  Problem: Hypercholesteremia.  Plan: c/w statin.      Problem/Plan - 5:  ·  Problem: DVT prophylaxis    D/c planning
47 yo m with acute on chronic back pain/sciatica     Problem/Plan - 1:  ·  Problem: Acute right-sided low back pain with right-sided sciatica.  Plan: -seen by neurology, who recommend no need for MRI   -symptoms stemming from worsening of  known radiculopathy   - PM&R appreciated, c/w medrol dosepack, pregabalin, Oxy  -encourage weight loss  -PT  - L spine Xray reviewed, Ortho eval pending     Problem/Plan - 2:  ·  Problem: Hypokalemia.  Plan: - repleted     Problem/Plan - 3:  ·  Problem: Type 2 diabetes mellitus with complication, unspecified whether long term insulin use.  Plan: -reports no longer taking insulin, just glipizide   -hold orals, place on ISS     Problem/Plan - 4:  ·  Problem: Hypercholesteremia.  Plan: c/w statin.      Problem/Plan - 5:  ·  Problem: DVT prophylaxis    D/c planning when pain controlled
47 yo m with acute on chronic back pain/sciatica     Problem/Plan - 1:  ·  Problem: Acute right-sided low back pain with right-sided sciatica.  Plan: -seen by neurology, who recommend no need for MRI   -symptoms stemming from worsening of  known radiculopathy   -pain control, transition to PO, add Flexeril  -encourage weight loss  -PT     Problem/Plan - 2:  ·  Problem: Hypokalemia.  Plan: - repleted     Problem/Plan - 3:  ·  Problem: Type 2 diabetes mellitus with complication, unspecified whether long term insulin use.  Plan: -reports no longer taking insulin, just glipizide   -hold orals, place on ISS     Problem/Plan - 4:  ·  Problem: Hypercholesteremia.  Plan: c/w statin.      Problem/Plan - 5:  ·  Problem: DVT prophylaxis    D/c planning to rehab
47 yo m with acute on chronic back pain/sciatica     Problem/Plan - 1:  ·  Problem: Acute right-sided low back pain with right-sided sciatica.  Plan: -seen by neurology, who recommend no need for MRI   -symptoms stemming from worsening of  known radiculopathy   -pain control, transition to PO, add Flexeril  -encourage weight loss  -PT     Problem/Plan - 2:  ·  Problem: Hypokalemia.  Plan: -denies vomiting, diarrhea  -Mg level at low end of normal  -K, Mg supplements ordered.      Problem/Plan - 3:  ·  Problem: Type 2 diabetes mellitus with complication, unspecified whether long term insulin use.  Plan: -reports no longer taking insulin, just glipizide   -hold orals, place on ISS     Problem/Plan - 4:  ·  Problem: Hypercholesteremia.  Plan: c/w statin.      Problem/Plan - 5:  ·  Problem: DVT prophylaxis
47 yo m with acute on chronic back pain/sciatica     Problem/Plan - 1:  ·  Problem: Acute right-sided low back pain with right-sided sciatica.  Plan: -seen by neurology, who recommend no need for MRI   -symptoms stemming from worsening of  known radiculopathy   -pain control, transition to PO, add Flexeril  -encourage weight loss  -PT     Problem/Plan - 2:  ·  Problem: Hypokalemia.  Plan: -denies vomiting, diarrhea  -Mg level at low end of normal  -K, Mg supplements ordered.      Problem/Plan - 3:  ·  Problem: Type 2 diabetes mellitus with complication, unspecified whether long term insulin use.  Plan: -reports no longer taking insulin, just glipizide   -hold orals, place on ISS     Problem/Plan - 4:  ·  Problem: Hypercholesteremia.  Plan: c/w statin.      Problem/Plan - 5:  ·  Problem: DVT prophylaxis    D/c planning to rehab
49 yo m with acute on chronic back pain/sciatica     Problem/Plan - 1:  ·  Problem: Acute right-sided low back pain with right-sided sciatica.  Plan: -seen by neurology, who recommend no need for MRI   -symptoms stemming from worsening of  known radiculopathy   - PM&R appreciated, c/w medrol dosepack, pregabalin, Oxy  -encourage weight loss  -PT  - L spine Xray reviewed, Ortho eval pending, requested MRI prior to eval, to be done today     Problem/Plan - 2:  ·  Problem: Hypokalemia.  Plan: - repleted     Problem/Plan - 3:  ·  Problem: Type 2 diabetes mellitus with complication, unspecified whether long term insulin use.  Plan: -reports no longer taking insulin, just glipizide   -hold orals, place on ISS     Problem/Plan - 4:  ·  Problem: Hypercholesteremia.  Plan: c/w statin.      Problem/Plan - 5:  ·  Problem: DVT prophylaxis    D/c planning when pain controlled
49 yo m with acute on chronic back pain/sciatica     Problem/Plan - 1:  ·  Problem: Acute right-sided low back pain with right-sided sciatica.  Plan: -seen by neurology, who recommend no need for MRI   -symptoms stemming from worsening of  known radiculopathy   -pain control, transition to PO, add Flexeril  -encourage weight loss  -PT     Problem/Plan - 2:  ·  Problem: Hypokalemia.  Plan: - repleted     Problem/Plan - 3:  ·  Problem: Type 2 diabetes mellitus with complication, unspecified whether long term insulin use.  Plan: -reports no longer taking insulin, just glipizide   -hold orals, place on ISS     Problem/Plan - 4:  ·  Problem: Hypercholesteremia.  Plan: c/w statin.      Problem/Plan - 5:  ·  Problem: DVT prophylaxis    D/c planning to rehab
49 yo m with acute on chronic back pain/sciatica     Problem/Plan - 1:  ·  Problem: Acute right-sided low back pain with right-sided sciatica.  Plan: -seen by neurology, who recommend no need for MRI   -symptoms stemming from worsening of  known radiculopathy   -pain control, transition to PO, add Flexeril  -encourage weight loss  -PT     Problem/Plan - 2:  ·  Problem: Hypokalemia.  Plan: - repleted     Problem/Plan - 3:  ·  Problem: Type 2 diabetes mellitus with complication, unspecified whether long term insulin use.  Plan: -reports no longer taking insulin, just glipizide   -hold orals, place on ISS     Problem/Plan - 4:  ·  Problem: Hypercholesteremia.  Plan: c/w statin.      Problem/Plan - 5:  ·  Problem: DVT prophylaxis    D/c planning to rehab
Lumbar radiculopathy  - Continue  Medrol Dosepak, monitor BS  Currently on pregabalin and gabapentin, as they work similarly, please dc one- would dc pregabalin.   Continue  Oxycodone 15 mg, add MsContin 15mg q12h   MRI can be done as outpt if pain can be controlled ( pt had MRI in [place])  Will need PT- explained to pt

## 2018-07-14 NOTE — PROGRESS NOTE ADULT - SUBJECTIVE AND OBJECTIVE BOX
CHIEF COMPLAINT:Patient is a 48y old  Male who presents with a chief complaint of back pain (09 Jul 2018 15:07)    	        PAST MEDICAL & SURGICAL HISTORY:  Hypercholesteremia  Diabetes mellitus: ~ diagnosed during week of 08/13 - 19/2017  Abscess  H/O arthroscopy of right knee          REVIEW OF SYSTEMS:  CONSTITUTIONAL: No fever, weight loss, or fatigue  EYES: No eye pain, visual disturbances, or discharge  NECK: No pain or stiffness  RESPIRATORY: No cough, wheezing, chills or hemoptysis; No Shortness of Breath  CARDIOVASCULAR: No chest pain, palpitations, passing out, dizziness, or leg swelling  GASTROINTESTINAL: No abdominal or epigastric pain. No nausea, vomiting, or hematemesis; No diarrhea or constipation. No melena or hematochezia.  GENITOURINARY: No dysuria, frequency, hematuria, or incontinence  NEUROLOGICAL: No headaches, memory loss, loss of strength, numbness, or tremors  c/o back pain   Medications:  MEDICATIONS  (STANDING):  acetaminophen   Tablet. 650 milliGRAM(s) Oral every 8 hours  acetaminophen  IVPB. 1000 milliGRAM(s) IV Intermittent once  dextrose 5%. 1000 milliLiter(s) (50 mL/Hr) IV Continuous <Continuous>  dextrose 50% Injectable 12.5 Gram(s) IV Push once  dextrose 50% Injectable 25 Gram(s) IV Push once  dextrose 50% Injectable 25 Gram(s) IV Push once  docusate sodium 100 milliGRAM(s) Oral three times a day  heparin  Injectable 5000 Unit(s) SubCutaneous every 12 hours  insulin lispro (HumaLOG) corrective regimen sliding scale   SubCutaneous three times a day before meals  insulin lispro (HumaLOG) corrective regimen sliding scale   SubCutaneous at bedtime  lidocaine   Patch 1 Patch Transdermal daily  methylPREDNISolone   Oral   methylPREDNISolone 4 milliGRAM(s) Oral before breakfast  methylPREDNISolone 4 milliGRAM(s) Oral at bedtime  polyethylene glycol 3350 17 Gram(s) Oral daily  pregabalin 50 milliGRAM(s) Oral three times a day  senna 2 Tablet(s) Oral at bedtime  simvastatin 20 milliGRAM(s) Oral at bedtime    MEDICATIONS  (PRN):  cyclobenzaprine 10 milliGRAM(s) Oral three times a day PRN Muscle Spasm  dextrose 40% Gel 15 Gram(s) Oral once PRN Blood Glucose LESS THAN 70 milliGRAM(s)/deciliter  glucagon  Injectable 1 milliGRAM(s) IntraMuscular once PRN Glucose LESS THAN 70 milligrams/deciliter  oxyCODONE    IR 15 milliGRAM(s) Oral every 4 hours PRN moderate and severe pain    	    PHYSICAL EXAM:  T(C): 36.3 (07-13-18 @ 21:41), Max: 37 (07-13-18 @ 12:45)  HR: 84 (07-14-18 @ 07:00) (75 - 100)  BP: 132/96 (07-14-18 @ 07:00) (132/96 - 151/103)  RR: 18 (07-14-18 @ 07:00) (18 - 18)  SpO2: 97% (07-14-18 @ 07:00) (94% - 97%)  Wt(kg): --  I&O's Summary      Appearance: Normal	  HEENT:   Normal oral mucosa, PERRL, EOMI	  Lymphatic: No lymphadenopathy  Cardiovascular: Normal S1 S2, No JVD, No murmurs, No edema  Respiratory: Lungs clear to auscultation	  Psychiatry: A & O x 3, Mood & affect appropriate  Gastrointestinal:  Soft, Non-tender, + BS	  Skin: No rashes, No ecchymoses, No cyanosis	  Neurologic: Non-focal from sensory intact dtr equal  Extremities: Normal range of motion, No clubbing, cyanosis or edema  Vascular: Peripheral pulses palpable 2+ bilaterally    TELEMETRY: 	    ECG:  	  RADIOLOGY:  OTHER: 	  	  LABS:	 	    CARDIAC MARKERS:                                17.1   6.88  )-----------( 237      ( 14 Jul 2018 06:35 )             51.9     07-14    137  |  95<L>  |  18  ----------------------------<  198<H>  3.9   |  29  |  1.07    Ca    9.6      14 Jul 2018 06:58      proBNP:   Lipid Profile:   HgA1c:   TSH:
CHIEF COMPLAINT:Patient is a 48y old  Male who presents with a chief complaint of back pain (05 Jul 2018 02:01)      Allergies:  No Known Allergies  pork (Other)      PAST MEDICAL & SURGICAL HISTORY:  Hypercholesteremia  Diabetes mellitus: ~ diagnosed during week of 08/13 - 19/2017  Abscess  H/O arthroscopy of right knee      FAMILY HISTORY:  Family history of heart failure: father  Family history of breast cancer (Aunt, Aunt)  Family history of hypertension (Grandparent): grandmother, mother  Family history of cerebrovascular accident (CVA) (Grandparent): grandmother  Family history of diabetes mellitus (Sibling): parents, sister      REVIEW OF SYSTEMS:  CONSTITUTIONAL: No fever, weight loss, or fatigue  EYES: No eye pain, visual disturbances, or discharge  NECK: No pain or stiffness  RESPIRATORY: No cough or wheezing, no shortness of breath  CARDIOVASCULAR: No chest pain, palpitations, dizziness, or leg swelling  GASTROINTESTINAL: No abdominal or epigastric pain. No nausea, vomiting, diarrhea or constipation  GENITOURINARY: No dysuria, urinary frequency or urgency, no hematuria  NEUROLOGICAL: No headaches, memory loss, loss of strength, numbness, or tremors  SKIN: No itching, burning, rashes, or lesions   MUSCULOSKELETAL: + back pain    Medications:  MEDICATIONS  (STANDING):  dextrose 5%. 1000 milliLiter(s) (50 mL/Hr) IV Continuous <Continuous>  dextrose 50% Injectable 12.5 Gram(s) IV Push once  dextrose 50% Injectable 25 Gram(s) IV Push once  dextrose 50% Injectable 25 Gram(s) IV Push once  heparin  Injectable 5000 Unit(s) SubCutaneous every 12 hours  insulin lispro (HumaLOG) corrective regimen sliding scale   SubCutaneous three times a day before meals  insulin lispro (HumaLOG) corrective regimen sliding scale   SubCutaneous at bedtime  simvastatin 20 milliGRAM(s) Oral at bedtime    MEDICATIONS  (PRN):  cyclobenzaprine 5 milliGRAM(s) Oral three times a day PRN Muscle Spasm  dextrose 40% Gel 15 Gram(s) Oral once PRN Blood Glucose LESS THAN 70 milliGRAM(s)/deciliter  glucagon  Injectable 1 milliGRAM(s) IntraMuscular once PRN Glucose LESS THAN 70 milligrams/deciliter  HYDROmorphone   Tablet 2 milliGRAM(s) Oral every 6 hours PRN Severe Pain (7 - 10)  HYDROmorphone  Injectable 0.5 milliGRAM(s) IV Push every 4 hours PRN Moderate Pain (4 - 6)    	    PHYSICAL EXAM:  T(C): 36.7 (07-05-18 @ 06:52), Max: 37 (07-05-18 @ 06:01)  HR: 75 (07-05-18 @ 11:10) (72 - 90)  BP: 143/75 (07-05-18 @ 11:10) (143/75 - 174/101)  RR: 16 (07-05-18 @ 11:10) (16 - 18)  SpO2: 100% (07-05-18 @ 06:52) (93% - 100%)  Wt(kg): --  I&O's Summary      Appearance: Normal	  HEENT:   NCAT, PERRL, EOMI	  Lymphatic: No lymphadenopathy  Cardiovascular: Normal S1 S2, RRR  Respiratory: Lungs clear to auscultation BL  Psychiatry: A & O x 3, Mood & affect appropriate  Gastrointestinal:  Soft, Non-tender, + BS  Skin: No rashes, No ecchymoses, No cyanosis	  Neurologic: Non-focal  Extremities: Normal range of motion, No clubbing, cyanosis or edema    	  LABS:	 	    CARDIAC MARKERS:                                14.0   8.49  )-----------( 201      ( 04 Jul 2018 23:14 )             43.3     07-05    139  |  100  |  9   ----------------------------<  164<H>  3.4<L>   |  28  |  0.97    Ca    9.3      05 Jul 2018 06:35  Mg     1.8     07-04    TPro  7.5  /  Alb  3.9  /  TBili  0.4  /  DBili  x   /  AST  13  /  ALT  5   /  AlkPhos  104  07-04    proBNP:   Lipid Profile:   HgA1c:   TSH:
CHIEF COMPLAINT:Patient is a 48y old  Male who presents with a chief complaint of back pain (05 Jul 2018 02:01)  less back pain    Allergies:  No Known Allergies  pork (Other)      PAST MEDICAL & SURGICAL HISTORY:  Hypercholesteremia  Diabetes mellitus: ~ diagnosed during week of 08/13 - 19/2017  Abscess  H/O arthroscopy of right knee      FAMILY HISTORY:  Family history of heart failure: father  Family history of breast cancer (Aunt, Aunt)  Family history of hypertension (Grandparent): grandmother, mother  Family history of cerebrovascular accident (CVA) (Grandparent): grandmother  Family history of diabetes mellitus (Sibling): parents, sister      REVIEW OF SYSTEMS:  CONSTITUTIONAL: No fever, weight loss, or fatigue  EYES: No eye pain, visual disturbances, or discharge  NECK: No pain or stiffness  RESPIRATORY: No cough or wheezing, no shortness of breath  CARDIOVASCULAR: No chest pain, palpitations, dizziness, or leg swelling  GASTROINTESTINAL: No abdominal or epigastric pain. No nausea, vomiting, diarrhea or constipation  GENITOURINARY: No dysuria, urinary frequency or urgency, no hematuria  NEUROLOGICAL: No headaches, memory loss, loss of strength, numbness, or tremors  SKIN: No itching, burning, rashes, or lesions   MUSCULOSKELETAL: less back pain      Medications:  MEDICATIONS  (STANDING):  dextrose 5%. 1000 milliLiter(s) (50 mL/Hr) IV Continuous <Continuous>  dextrose 50% Injectable 12.5 Gram(s) IV Push once  dextrose 50% Injectable 25 Gram(s) IV Push once  dextrose 50% Injectable 25 Gram(s) IV Push once  heparin  Injectable 5000 Unit(s) SubCutaneous every 12 hours  insulin lispro (HumaLOG) corrective regimen sliding scale   SubCutaneous three times a day before meals  insulin lispro (HumaLOG) corrective regimen sliding scale   SubCutaneous at bedtime  potassium chloride    Tablet ER 40 milliEquivalent(s) Oral every 4 hours  pregabalin 50 milliGRAM(s) Oral at bedtime  simvastatin 20 milliGRAM(s) Oral at bedtime    MEDICATIONS  (PRN):  cyclobenzaprine 5 milliGRAM(s) Oral three times a day PRN Muscle Spasm  dextrose 40% Gel 15 Gram(s) Oral once PRN Blood Glucose LESS THAN 70 milliGRAM(s)/deciliter  glucagon  Injectable 1 milliGRAM(s) IntraMuscular once PRN Glucose LESS THAN 70 milligrams/deciliter  HYDROmorphone   Tablet 2 milliGRAM(s) Oral every 6 hours PRN Severe Pain (7 - 10)    	    PHYSICAL EXAM:  T(C): 37.1 (07-06-18 @ 06:18), Max: 37.1 (07-06-18 @ 06:18)  HR: 92 (07-06-18 @ 06:18) (70 - 92)  BP: 154/98 (07-06-18 @ 06:18) (141/87 - 154/98)  RR: 16 (07-06-18 @ 06:18) (16 - 17)  SpO2: 100% (07-06-18 @ 06:18) (98% - 100%)  Wt(kg): --  I&O's Summary    05 Jul 2018 07:01  -  06 Jul 2018 07:00  --------------------------------------------------------  IN: 0 mL / OUT: 1150 mL / NET: -1150 mL      Appearance: Normal	  HEENT:   NCAT, PERRL, EOMI	  Lymphatic: No lymphadenopathy  Cardiovascular: Normal S1 S2, RRR  Respiratory: Lungs clear to auscultation BL  Psychiatry: A & O x 3, Mood & affect appropriate  Gastrointestinal:  Soft, Non-tender, + BS  Skin: No rashes, No ecchymoses, No cyanosis	  Neurologic: Non-focal  Extremities: Normal range of motion, No clubbing, cyanosis or edema    LABS:	 	    CARDIAC MARKERS:                                16.1   6.02  )-----------( 207      ( 06 Jul 2018 06:40 )             48.6     07-06    137  |  98  |  9   ----------------------------<  142<H>  3.2<L>   |  28  |  0.94    Ca    9.5      06 Jul 2018 06:40  Mg     1.8     07-04    TPro  7.5  /  Alb  3.9  /  TBili  0.4  /  DBili  x   /  AST  13  /  ALT  5   /  AlkPhos  104  07-04    proBNP:   Lipid Profile:   HgA1c:   TSH:
CHIEF COMPLAINT:Patient is a 48y old  Male who presents with a chief complaint of back pain (05 Jul 2018 02:01)  no acute events    Allergies:  No Known Allergies  pork (Other)      PAST MEDICAL & SURGICAL HISTORY:  Hypercholesteremia  Diabetes mellitus: ~ diagnosed during week of 08/13 - 19/2017  Abscess  H/O arthroscopy of right knee      FAMILY HISTORY:  Family history of heart failure: father  Family history of breast cancer (Aunt, Aunt)  Family history of hypertension (Grandparent): grandmother, mother  Family history of cerebrovascular accident (CVA) (Grandparent): grandmother  Family history of diabetes mellitus (Sibling): parents, sister      REVIEW OF SYSTEMS:  CONSTITUTIONAL: No fever, weight loss, or fatigue  EYES: No eye pain, visual disturbances, or discharge  NECK: No pain or stiffness  RESPIRATORY: No cough or wheezing, no shortness of breath  CARDIOVASCULAR: No chest pain, palpitations, dizziness, or leg swelling  GASTROINTESTINAL: No abdominal or epigastric pain. No nausea, vomiting, diarrhea or constipation  GENITOURINARY: No dysuria, urinary frequency or urgency, no hematuria  NEUROLOGICAL: No headaches, memory loss, loss of strength, numbness, or tremors  SKIN: No itching, burning, rashes, or lesions   MUSCULOSKELETAL: back pain improving, but still present      Medications:  MEDICATIONS  (STANDING):  dextrose 5%. 1000 milliLiter(s) (50 mL/Hr) IV Continuous <Continuous>  dextrose 50% Injectable 12.5 Gram(s) IV Push once  dextrose 50% Injectable 25 Gram(s) IV Push once  dextrose 50% Injectable 25 Gram(s) IV Push once  docusate sodium 100 milliGRAM(s) Oral three times a day  gabapentin 200 milliGRAM(s) Oral three times a day  heparin  Injectable 5000 Unit(s) SubCutaneous every 12 hours  insulin lispro (HumaLOG) corrective regimen sliding scale   SubCutaneous three times a day before meals  insulin lispro (HumaLOG) corrective regimen sliding scale   SubCutaneous at bedtime  polyethylene glycol 3350 17 Gram(s) Oral daily  pregabalin 50 milliGRAM(s) Oral at bedtime  senna 2 Tablet(s) Oral at bedtime  simvastatin 20 milliGRAM(s) Oral at bedtime    MEDICATIONS  (PRN):  cyclobenzaprine 5 milliGRAM(s) Oral three times a day PRN Muscle Spasm  dextrose 40% Gel 15 Gram(s) Oral once PRN Blood Glucose LESS THAN 70 milliGRAM(s)/deciliter  glucagon  Injectable 1 milliGRAM(s) IntraMuscular once PRN Glucose LESS THAN 70 milligrams/deciliter  HYDROmorphone   Tablet 2 milliGRAM(s) Oral every 6 hours PRN Severe Pain (7 - 10)    	    PHYSICAL EXAM:  T(C): 36.7 (07-09-18 @ 05:24), Max: 36.8 (07-08-18 @ 12:54)  HR: 83 (07-09-18 @ 05:24) (83 - 89)  BP: 146/95 (07-09-18 @ 05:24) (126/92 - 146/95)  RR: 18 (07-09-18 @ 05:24) (17 - 18)  SpO2: 95% (07-09-18 @ 05:24) (95% - 99%)  Wt(kg): --  I&O's Summary    Appearance: Normal	  HEENT:   NCAT, PERRL, EOMI	  Lymphatic: No lymphadenopathy  Cardiovascular: Normal S1 S2, RRR  Respiratory: Lungs clear to auscultation BL  Psychiatry: A & O x 3, Mood & affect appropriate  Gastrointestinal:  Soft, Non-tender, + BS  Skin: No rashes, No ecchymoses, No cyanosis	  Neurologic: Non-focal  Extremities: Normal range of motion, No clubbing, cyanosis or edema    LABS:	 	    CARDIAC MARKERS:                                16.1   5.72  )-----------( 201      ( 09 Jul 2018 07:55 )             47.0     07-09    140  |  100  |  16  ----------------------------<  141<H>  3.7   |  26  |  0.95    Ca    9.6      09 Jul 2018 07:55      proBNP:   Lipid Profile:   HgA1c:   TSH:
CHIEF COMPLAINT:Patient is a 48y old  Male who presents with a chief complaint of back pain (05 Jul 2018 02:01)  no acute events    Allergies:  No Known Allergies  pork (Other)      PAST MEDICAL & SURGICAL HISTORY:  Hypercholesteremia  Diabetes mellitus: ~ diagnosed during week of 08/13 - 19/2017  Abscess  H/O arthroscopy of right knee      FAMILY HISTORY:  Family history of heart failure: father  Family history of breast cancer (Aunt, Aunt)  Family history of hypertension (Grandparent): grandmother, mother  Family history of cerebrovascular accident (CVA) (Grandparent): grandmother  Family history of diabetes mellitus (Sibling): parents, sister      REVIEW OF SYSTEMS:  CONSTITUTIONAL: No fever, weight loss, or fatigue  EYES: No eye pain, visual disturbances, or discharge  NECK: No pain or stiffness  RESPIRATORY: No cough or wheezing, no shortness of breath  CARDIOVASCULAR: No chest pain, palpitations, dizziness, or leg swelling  GASTROINTESTINAL: No abdominal or epigastric pain. No nausea, vomiting, diarrhea or constipation  GENITOURINARY: No dysuria, urinary frequency or urgency, no hematuria  NEUROLOGICAL: No headaches, memory loss, loss of strength, numbness, or tremors  SKIN: No itching, burning, rashes, or lesions   MUSCULOSKELETAL: back pain improving, but still present      Medications:  MEDICATIONS  (STANDING):  dextrose 5%. 1000 milliLiter(s) (50 mL/Hr) IV Continuous <Continuous>  dextrose 50% Injectable 12.5 Gram(s) IV Push once  dextrose 50% Injectable 25 Gram(s) IV Push once  dextrose 50% Injectable 25 Gram(s) IV Push once  docusate sodium 100 milliGRAM(s) Oral three times a day  gabapentin 300 milliGRAM(s) Oral every 8 hours  heparin  Injectable 5000 Unit(s) SubCutaneous every 12 hours  insulin lispro (HumaLOG) corrective regimen sliding scale   SubCutaneous three times a day before meals  insulin lispro (HumaLOG) corrective regimen sliding scale   SubCutaneous at bedtime  methylPREDNISolone   Oral   methylPREDNISolone 4 milliGRAM(s) Oral before breakfast  methylPREDNISolone 4 milliGRAM(s) Oral after lunch  methylPREDNISolone 4 milliGRAM(s) Oral after dinner  methylPREDNISolone 8 milliGRAM(s) Oral at bedtime  polyethylene glycol 3350 17 Gram(s) Oral daily  pregabalin 50 milliGRAM(s) Oral three times a day  senna 2 Tablet(s) Oral at bedtime  simvastatin 20 milliGRAM(s) Oral at bedtime    MEDICATIONS  (PRN):  dextrose 40% Gel 15 Gram(s) Oral once PRN Blood Glucose LESS THAN 70 milliGRAM(s)/deciliter  glucagon  Injectable 1 milliGRAM(s) IntraMuscular once PRN Glucose LESS THAN 70 milligrams/deciliter  oxyCODONE    IR 15 milliGRAM(s) Oral every 4 hours PRN moderate and severe pain    	    PHYSICAL EXAM:  T(C): 36.9 (07-11-18 @ 13:28), Max: 36.9 (07-11-18 @ 13:28)  HR: 85 (07-11-18 @ 13:28) (82 - 110)  BP: 145/99 (07-11-18 @ 13:28) (103/117 - 145/99)  RR: 18 (07-11-18 @ 13:28) (16 - 18)  SpO2: 97% (07-11-18 @ 13:28) (97% - 98%)  Wt(kg): --  I&O's Summary    Appearance: Normal	  HEENT:   NCAT, PERRL, EOMI	  Lymphatic: No lymphadenopathy  Cardiovascular: Normal S1 S2, RRR  Respiratory: Lungs clear to auscultation BL  Psychiatry: A & O x 3, Mood & affect appropriate  Gastrointestinal:  Soft, Non-tender, + BS  Skin: No rashes, No ecchymoses, No cyanosis	  Neurologic: Non-focal  Extremities: Normal range of motion, No clubbing, cyanosis or edema    LABS:	 	    CARDIAC MARKERS:                  proBNP:   Lipid Profile:   HgA1c:   TSH:
CHIEF COMPLAINT:Patient is a 48y old  Male who presents with a chief complaint of back pain (05 Jul 2018 02:01)  no acute events    Allergies:  No Known Allergies  pork (Other)      PAST MEDICAL & SURGICAL HISTORY:  Hypercholesteremia  Diabetes mellitus: ~ diagnosed during week of 08/13 - 19/2017  Abscess  H/O arthroscopy of right knee      FAMILY HISTORY:  Family history of heart failure: father  Family history of breast cancer (Aunt, Aunt)  Family history of hypertension (Grandparent): grandmother, mother  Family history of cerebrovascular accident (CVA) (Grandparent): grandmother  Family history of diabetes mellitus (Sibling): parents, sister      REVIEW OF SYSTEMS:  CONSTITUTIONAL: No fever, weight loss, or fatigue  EYES: No eye pain, visual disturbances, or discharge  NECK: No pain or stiffness  RESPIRATORY: No cough or wheezing, no shortness of breath  CARDIOVASCULAR: No chest pain, palpitations, dizziness, or leg swelling  GASTROINTESTINAL: No abdominal or epigastric pain. No nausea, vomiting, diarrhea or constipation  GENITOURINARY: No dysuria, urinary frequency or urgency, no hematuria  NEUROLOGICAL: No headaches, memory loss, loss of strength, numbness, or tremors  SKIN: No itching, burning, rashes, or lesions   MUSCULOSKELETAL: back pain improving, but still present      Medications:  MEDICATIONS  (STANDING):  dextrose 5%. 1000 milliLiter(s) (50 mL/Hr) IV Continuous <Continuous>  dextrose 50% Injectable 12.5 Gram(s) IV Push once  dextrose 50% Injectable 25 Gram(s) IV Push once  dextrose 50% Injectable 25 Gram(s) IV Push once  docusate sodium 100 milliGRAM(s) Oral three times a day  gabapentin 300 milliGRAM(s) Oral every 8 hours  heparin  Injectable 5000 Unit(s) SubCutaneous every 12 hours  insulin lispro (HumaLOG) corrective regimen sliding scale   SubCutaneous three times a day before meals  insulin lispro (HumaLOG) corrective regimen sliding scale   SubCutaneous at bedtime  methylPREDNISolone   Oral   polyethylene glycol 3350 17 Gram(s) Oral daily  pregabalin 50 milliGRAM(s) Oral three times a day  senna 2 Tablet(s) Oral at bedtime  simvastatin 20 milliGRAM(s) Oral at bedtime    MEDICATIONS  (PRN):  dextrose 40% Gel 15 Gram(s) Oral once PRN Blood Glucose LESS THAN 70 milliGRAM(s)/deciliter  glucagon  Injectable 1 milliGRAM(s) IntraMuscular once PRN Glucose LESS THAN 70 milligrams/deciliter  oxyCODONE    IR 15 milliGRAM(s) Oral every 4 hours PRN moderate and severe pain    	    PHYSICAL EXAM:  T(C): 36.8 (07-10-18 @ 06:22), Max: 36.9 (07-09-18 @ 20:38)  HR: 91 (07-10-18 @ 06:22) (76 - 91)  BP: 152/106 (07-10-18 @ 06:22) (137/86 - 152/106)  RR: 18 (07-10-18 @ 06:22) (18 - 19)  SpO2: 98% (07-10-18 @ 06:22) (94% - 100%)  Wt(kg): --  I&O's Summary    Appearance: Normal	  HEENT:   NCAT, PERRL, EOMI	  Lymphatic: No lymphadenopathy  Cardiovascular: Normal S1 S2, RRR  Respiratory: Lungs clear to auscultation BL  Psychiatry: A & O x 3, Mood & affect appropriate  Gastrointestinal:  Soft, Non-tender, + BS  Skin: No rashes, No ecchymoses, No cyanosis	  Neurologic: Non-focal  Extremities: Normal range of motion, No clubbing, cyanosis or edema    LABS:	 	    CARDIAC MARKERS:                                16.1   5.72  )-----------( 201      ( 09 Jul 2018 07:55 )             47.0     07-09    140  |  100  |  16  ----------------------------<  141<H>  3.7   |  26  |  0.95    Ca    9.6      09 Jul 2018 07:55      proBNP:   Lipid Profile:   HgA1c:   TSH:
CHIEF COMPLAINT:Patient is a 48y old  Male who presents with a chief complaint of back pain (05 Jul 2018 02:01)  no acute events    Allergies:  No Known Allergies  pork (Other)      PAST MEDICAL & SURGICAL HISTORY:  Hypercholesteremia  Diabetes mellitus: ~ diagnosed during week of 08/13 - 19/2017  Abscess  H/O arthroscopy of right knee      FAMILY HISTORY:  Family history of heart failure: father  Family history of breast cancer (Aunt, Aunt)  Family history of hypertension (Grandparent): grandmother, mother  Family history of cerebrovascular accident (CVA) (Grandparent): grandmother  Family history of diabetes mellitus (Sibling): parents, sister      REVIEW OF SYSTEMS:  CONSTITUTIONAL: No fever, weight loss, or fatigue  EYES: No eye pain, visual disturbances, or discharge  NECK: No pain or stiffness  RESPIRATORY: No cough or wheezing, no shortness of breath  CARDIOVASCULAR: No chest pain, palpitations, dizziness, or leg swelling  GASTROINTESTINAL: No abdominal or epigastric pain. No nausea, vomiting, diarrhea or constipation  GENITOURINARY: No dysuria, urinary frequency or urgency, no hematuria  NEUROLOGICAL: No headaches, memory loss, loss of strength, numbness, or tremors  SKIN: No itching, burning, rashes, or lesions   MUSCULOSKELETAL: back pain improving, but still present      Medications:  MEDICATIONS  (STANDING):  dextrose 5%. 1000 milliLiter(s) (50 mL/Hr) IV Continuous <Continuous>  dextrose 50% Injectable 12.5 Gram(s) IV Push once  dextrose 50% Injectable 25 Gram(s) IV Push once  dextrose 50% Injectable 25 Gram(s) IV Push once  docusate sodium 100 milliGRAM(s) Oral three times a day  heparin  Injectable 5000 Unit(s) SubCutaneous every 12 hours  insulin lispro (HumaLOG) corrective regimen sliding scale   SubCutaneous three times a day before meals  insulin lispro (HumaLOG) corrective regimen sliding scale   SubCutaneous at bedtime  methylPREDNISolone   Oral   methylPREDNISolone 4 milliGRAM(s) Oral before breakfast  methylPREDNISolone 4 milliGRAM(s) Oral after lunch  methylPREDNISolone 4 milliGRAM(s) Oral after dinner  methylPREDNISolone 4 milliGRAM(s) Oral at bedtime  polyethylene glycol 3350 17 Gram(s) Oral daily  pregabalin 50 milliGRAM(s) Oral three times a day  senna 2 Tablet(s) Oral at bedtime  simvastatin 20 milliGRAM(s) Oral at bedtime    MEDICATIONS  (PRN):  dextrose 40% Gel 15 Gram(s) Oral once PRN Blood Glucose LESS THAN 70 milliGRAM(s)/deciliter  glucagon  Injectable 1 milliGRAM(s) IntraMuscular once PRN Glucose LESS THAN 70 milligrams/deciliter  oxyCODONE    IR 15 milliGRAM(s) Oral every 4 hours PRN moderate and severe pain    	    PHYSICAL EXAM:  T(C): 36.9 (07-12-18 @ 05:22), Max: 36.9 (07-11-18 @ 13:28)  HR: 82 (07-12-18 @ 05:22) (70 - 85)  BP: 160/107 (07-12-18 @ 05:22) (136/94 - 160/107)  RR: 18 (07-12-18 @ 05:22) (18 - 19)  SpO2: 97% (07-12-18 @ 05:22) (97% - 99%)  Wt(kg): --  I&O's Summary    Appearance: Normal	  HEENT:   NCAT, PERRL, EOMI	  Lymphatic: No lymphadenopathy  Cardiovascular: Normal S1 S2, RRR  Respiratory: Lungs clear to auscultation BL  Psychiatry: A & O x 3, Mood & affect appropriate  Gastrointestinal:  Soft, Non-tender, + BS  Skin: No rashes, No ecchymoses, No cyanosis	  Neurologic: Non-focal  Extremities: Normal range of motion, No clubbing, cyanosis or edema    LABS:	 	    CARDIAC MARKERS:                                16.9   7.67  )-----------( 240      ( 12 Jul 2018 06:09 )             49.8     07-12    138  |  96<L>  |  18  ----------------------------<  150<H>  4.5   |  28  |  1.04    Ca    10.1      12 Jul 2018 06:09      proBNP:   Lipid Profile:   HgA1c:   TSH:
CHIEF COMPLAINT:Patient is a 48y old  Male who presents with a chief complaint of back pain (05 Jul 2018 02:01)  no acute events    Allergies:  No Known Allergies  pork (Other)      PAST MEDICAL & SURGICAL HISTORY:  Hypercholesteremia  Diabetes mellitus: ~ diagnosed during week of 08/13 - 19/2017  Abscess  H/O arthroscopy of right knee      FAMILY HISTORY:  Family history of heart failure: father  Family history of breast cancer (Aunt, Aunt)  Family history of hypertension (Grandparent): grandmother, mother  Family history of cerebrovascular accident (CVA) (Grandparent): grandmother  Family history of diabetes mellitus (Sibling): parents, sister      REVIEW OF SYSTEMS:  CONSTITUTIONAL: No fever, weight loss, or fatigue  EYES: No eye pain, visual disturbances, or discharge  NECK: No pain or stiffness  RESPIRATORY: No cough or wheezing, no shortness of breath  CARDIOVASCULAR: No chest pain, palpitations, dizziness, or leg swelling  GASTROINTESTINAL: No abdominal or epigastric pain. No nausea, vomiting, diarrhea or constipation  GENITOURINARY: No dysuria, urinary frequency or urgency, no hematuria  NEUROLOGICAL: No headaches, memory loss, loss of strength, numbness, or tremors  SKIN: No itching, burning, rashes, or lesions   MUSCULOSKELETAL: back pain still present w/motion      Medications:  MEDICATIONS  (STANDING):  dextrose 5%. 1000 milliLiter(s) (50 mL/Hr) IV Continuous <Continuous>  dextrose 50% Injectable 12.5 Gram(s) IV Push once  dextrose 50% Injectable 25 Gram(s) IV Push once  dextrose 50% Injectable 25 Gram(s) IV Push once  docusate sodium 100 milliGRAM(s) Oral three times a day  heparin  Injectable 5000 Unit(s) SubCutaneous every 12 hours  insulin lispro (HumaLOG) corrective regimen sliding scale   SubCutaneous three times a day before meals  insulin lispro (HumaLOG) corrective regimen sliding scale   SubCutaneous at bedtime  methylPREDNISolone   Oral   methylPREDNISolone 4 milliGRAM(s) Oral before breakfast  methylPREDNISolone 4 milliGRAM(s) Oral at bedtime  polyethylene glycol 3350 17 Gram(s) Oral daily  pregabalin 50 milliGRAM(s) Oral three times a day  senna 2 Tablet(s) Oral at bedtime  simvastatin 20 milliGRAM(s) Oral at bedtime    MEDICATIONS  (PRN):  dextrose 40% Gel 15 Gram(s) Oral once PRN Blood Glucose LESS THAN 70 milliGRAM(s)/deciliter  glucagon  Injectable 1 milliGRAM(s) IntraMuscular once PRN Glucose LESS THAN 70 milligrams/deciliter  oxyCODONE    IR 15 milliGRAM(s) Oral every 4 hours PRN moderate and severe pain    	    PHYSICAL EXAM:  T(C): 37 (07-13-18 @ 12:45), Max: 37 (07-12-18 @ 21:20)  HR: 75 (07-13-18 @ 12:45) (75 - 85)  BP: 151/103 (07-13-18 @ 12:45) (141/103 - 162/118)  RR: 18 (07-13-18 @ 12:45) (17 - 18)  SpO2: 95% (07-13-18 @ 12:45) (95% - 98%)  Wt(kg): --  I&O's Summary    Appearance: Normal	  HEENT:   NCAT, PERRL, EOMI	  Lymphatic: No lymphadenopathy  Cardiovascular: Normal S1 S2, RRR  Respiratory: Lungs clear to auscultation BL  Psychiatry: A & O x 3, Mood & affect appropriate  Gastrointestinal:  Soft, Non-tender, + BS  Skin: No rashes, No ecchymoses, No cyanosis	  Neurologic: Non-focal  Extremities: Normal range of motion, No clubbing, cyanosis or edema    LABS:	 	    CARDIAC MARKERS:                                16.6   7.28  )-----------( 239      ( 13 Jul 2018 07:14 )             49.8     07-13    138  |  94<L>  |  17  ----------------------------<  162<H>  4.0   |  30  |  1.05    Ca    9.9      13 Jul 2018 07:14      proBNP:   Lipid Profile:   HgA1c:   TSH:
CHIEF COMPLAINT:Patient is a 48y old  Male who presents with a chief complaint of back pain (05 Jul 2018 02:01)  no acute events    Allergies:  No Known Allergies  pork (Other)      PAST MEDICAL & SURGICAL HISTORY:  Hypercholesteremia  Diabetes mellitus: ~ diagnosed during week of 08/13 - 19/2017  Abscess  H/O arthroscopy of right knee      FAMILY HISTORY:  Family history of heart failure: father  Family history of breast cancer (Aunt, Aunt)  Family history of hypertension (Grandparent): grandmother, mother  Family history of cerebrovascular accident (CVA) (Grandparent): grandmother  Family history of diabetes mellitus (Sibling): parents, sister      REVIEW OF SYSTEMS:  CONSTITUTIONAL: No fever, weight loss, or fatigue  EYES: No eye pain, visual disturbances, or discharge  NECK: No pain or stiffness  RESPIRATORY: No cough or wheezing, no shortness of breath  CARDIOVASCULAR: No chest pain, palpitations, dizziness, or leg swelling  GASTROINTESTINAL: No abdominal or epigastric pain. No nausea, vomiting, diarrhea or constipation  GENITOURINARY: No dysuria, urinary frequency or urgency, no hematuria  NEUROLOGICAL: No headaches, memory loss, loss of strength, numbness, or tremors  SKIN: No itching, burning, rashes, or lesions   MUSCULOSKELETAL: less back pain      Medications:  MEDICATIONS  (STANDING):  dextrose 5%. 1000 milliLiter(s) (50 mL/Hr) IV Continuous <Continuous>  dextrose 50% Injectable 12.5 Gram(s) IV Push once  dextrose 50% Injectable 25 Gram(s) IV Push once  dextrose 50% Injectable 25 Gram(s) IV Push once  docusate sodium 100 milliGRAM(s) Oral three times a day  heparin  Injectable 5000 Unit(s) SubCutaneous every 12 hours  insulin lispro (HumaLOG) corrective regimen sliding scale   SubCutaneous three times a day before meals  insulin lispro (HumaLOG) corrective regimen sliding scale   SubCutaneous at bedtime  ketorolac   Injectable 15 milliGRAM(s) IV Push every 6 hours  polyethylene glycol 3350 17 Gram(s) Oral daily  pregabalin 50 milliGRAM(s) Oral at bedtime  senna 2 Tablet(s) Oral at bedtime  simvastatin 20 milliGRAM(s) Oral at bedtime    MEDICATIONS  (PRN):  cyclobenzaprine 5 milliGRAM(s) Oral three times a day PRN Muscle Spasm  dextrose 40% Gel 15 Gram(s) Oral once PRN Blood Glucose LESS THAN 70 milliGRAM(s)/deciliter  glucagon  Injectable 1 milliGRAM(s) IntraMuscular once PRN Glucose LESS THAN 70 milligrams/deciliter  HYDROmorphone   Tablet 2 milliGRAM(s) Oral every 6 hours PRN Severe Pain (7 - 10)    	    PHYSICAL EXAM:  T(C): 36.6 (07-08-18 @ 06:55), Max: 37.1 (07-07-18 @ 12:52)  HR: 83 (07-08-18 @ 06:55) (83 - 87)  BP: 132/86 (07-08-18 @ 06:55) (132/86 - 139/94)  RR: 17 (07-08-18 @ 06:55) (17 - 17)  SpO2: 98% (07-08-18 @ 06:55) (97% - 99%)  Wt(kg): --  I&O's Summary    Appearance: Normal	  HEENT:   NCAT, PERRL, EOMI	  Lymphatic: No lymphadenopathy  Cardiovascular: Normal S1 S2, RRR  Respiratory: Lungs clear to auscultation BL  Psychiatry: A & O x 3, Mood & affect appropriate  Gastrointestinal:  Soft, Non-tender, + BS  Skin: No rashes, No ecchymoses, No cyanosis	  Neurologic: Non-focal  Extremities: Normal range of motion, No clubbing, cyanosis or edema    LABS:	 	    CARDIAC MARKERS:                                16.0   5.80  )-----------( 205      ( 08 Jul 2018 06:37 )             49.0     07-08    139  |  100  |  19  ----------------------------<  136<H>  3.7   |  28  |  1.01    Ca    9.6      08 Jul 2018 06:37      proBNP:   Lipid Profile:   HgA1c:   TSH:
CHIEF COMPLAINT:Patient is a 48y old  Male who presents with a chief complaint of back pain (05 Jul 2018 02:01)  still with some back pain    Allergies:  No Known Allergies  pork (Other)      PAST MEDICAL & SURGICAL HISTORY:  Hypercholesteremia  Diabetes mellitus: ~ diagnosed during week of 08/13 - 19/2017  Abscess  H/O arthroscopy of right knee      FAMILY HISTORY:  Family history of heart failure: father  Family history of breast cancer (Aunt, Aunt)  Family history of hypertension (Grandparent): grandmother, mother  Family history of cerebrovascular accident (CVA) (Grandparent): grandmother  Family history of diabetes mellitus (Sibling): parents, sister      REVIEW OF SYSTEMS:  CONSTITUTIONAL: No fever, weight loss, or fatigue  EYES: No eye pain, visual disturbances, or discharge  NECK: No pain or stiffness  RESPIRATORY: No cough or wheezing, no shortness of breath  CARDIOVASCULAR: No chest pain, palpitations, dizziness, or leg swelling  GASTROINTESTINAL: No abdominal or epigastric pain. No nausea, vomiting, diarrhea or constipation  GENITOURINARY: No dysuria, urinary frequency or urgency, no hematuria  NEUROLOGICAL: No headaches, memory loss, loss of strength, numbness, or tremors  SKIN: No itching, burning, rashes, or lesions   MUSCULOSKELETAL: + back pain      Medications:  MEDICATIONS  (STANDING):  dextrose 5%. 1000 milliLiter(s) (50 mL/Hr) IV Continuous <Continuous>  dextrose 50% Injectable 12.5 Gram(s) IV Push once  dextrose 50% Injectable 25 Gram(s) IV Push once  dextrose 50% Injectable 25 Gram(s) IV Push once  docusate sodium 100 milliGRAM(s) Oral three times a day  heparin  Injectable 5000 Unit(s) SubCutaneous every 12 hours  insulin lispro (HumaLOG) corrective regimen sliding scale   SubCutaneous three times a day before meals  insulin lispro (HumaLOG) corrective regimen sliding scale   SubCutaneous at bedtime  ketorolac   Injectable 15 milliGRAM(s) IV Push every 6 hours  polyethylene glycol 3350 17 Gram(s) Oral daily  pregabalin 50 milliGRAM(s) Oral at bedtime  senna 2 Tablet(s) Oral at bedtime  simvastatin 20 milliGRAM(s) Oral at bedtime    MEDICATIONS  (PRN):  cyclobenzaprine 5 milliGRAM(s) Oral three times a day PRN Muscle Spasm  dextrose 40% Gel 15 Gram(s) Oral once PRN Blood Glucose LESS THAN 70 milliGRAM(s)/deciliter  glucagon  Injectable 1 milliGRAM(s) IntraMuscular once PRN Glucose LESS THAN 70 milligrams/deciliter  HYDROmorphone   Tablet 2 milliGRAM(s) Oral every 6 hours PRN Severe Pain (7 - 10)    	    PHYSICAL EXAM:  T(C): 37.2 (07-07-18 @ 06:27), Max: 37.4 (07-06-18 @ 21:39)  HR: 88 (07-07-18 @ 06:27) (87 - 96)  BP: 167/100 (07-07-18 @ 06:27) (134/90 - 167/100)  RR: 17 (07-07-18 @ 06:27) (16 - 17)  SpO2: 98% (07-07-18 @ 06:27) (98% - 99%)  Wt(kg): --  I&O's Summary    Appearance: Normal	  HEENT:   NCAT, PERRL, EOMI	  Lymphatic: No lymphadenopathy  Cardiovascular: Normal S1 S2, RRR  Respiratory: Lungs clear to auscultation BL  Psychiatry: A & O x 3, Mood & affect appropriate  Gastrointestinal:  Soft, Non-tender, + BS  Skin: No rashes, No ecchymoses, No cyanosis	  Neurologic: Non-focal  Extremities: Normal range of motion, No clubbing, cyanosis or edema    LABS:	 	    CARDIAC MARKERS:                                16.1   6.02  )-----------( 207      ( 06 Jul 2018 06:40 )             48.6     07-06    137  |  98  |  9   ----------------------------<  142<H>  3.2<L>   |  28  |  0.94    Ca    9.5      06 Jul 2018 06:40      proBNP:   Lipid Profile:   HgA1c:   TSH:
HPI:  47 y/o male with past medical history DM2, HTN, HLD, Multiple lumbar disc herniations (L3-L4/L4-L5/L5-S1) presents to the ED with complaint of worsening lower back pain. Patient states he was walking  2 nights ago when he felt worsening lower back pain described as "achy and sharp" originating in the right lower back region with radiation down the  thigh, posterior leg, and dorsum of the foot.  Patient rated the pain 10/10 in severity.  No bowel/bladder changes Sees pain management as outpt, treated only with meds, cannot name any but gabapentin. Never had PT/epidural.   MRI LS P.  Interval: Continues to have pain radiating down anterior leg. PAin is 4/10 with pain meds, but still unable to sit without pain escalating. No bowel/bladder changes.     REVIEW OF SYSTEMS: No chest pain, shortness of breath, nausea, vomiting or diarhea.      MEDICATIONS  (STANDING):  dextrose 5%. 1000 milliLiter(s) (50 mL/Hr) IV Continuous <Continuous>  dextrose 50% Injectable 12.5 Gram(s) IV Push once  dextrose 50% Injectable 25 Gram(s) IV Push once  dextrose 50% Injectable 25 Gram(s) IV Push once  docusate sodium 100 milliGRAM(s) Oral three times a day  gabapentin 300 milliGRAM(s) Oral every 8 hours  heparin  Injectable 5000 Unit(s) SubCutaneous every 12 hours  insulin lispro (HumaLOG) corrective regimen sliding scale   SubCutaneous three times a day before meals  insulin lispro (HumaLOG) corrective regimen sliding scale   SubCutaneous at bedtime  methylPREDNISolone   Oral   methylPREDNISolone 4 milliGRAM(s) Oral before breakfast  methylPREDNISolone 4 milliGRAM(s) Oral after lunch  methylPREDNISolone 4 milliGRAM(s) Oral after dinner  methylPREDNISolone 8 milliGRAM(s) Oral at bedtime  polyethylene glycol 3350 17 Gram(s) Oral daily  pregabalin 50 milliGRAM(s) Oral three times a day  senna 2 Tablet(s) Oral at bedtime  simvastatin 20 milliGRAM(s) Oral at bedtime    MEDICATIONS  (PRN):  dextrose 40% Gel 15 Gram(s) Oral once PRN Blood Glucose LESS THAN 70 milliGRAM(s)/deciliter  glucagon  Injectable 1 milliGRAM(s) IntraMuscular once PRN Glucose LESS THAN 70 milligrams/deciliter  oxyCODONE    IR 15 milliGRAM(s) Oral every 4 hours PRN moderate and severe pain      ----------------------------------------------------------------------------------------  PHYSICAL EXAM  Constitutional - NAD, Comfortable  HEENT - NCAT, EOMI  Neck - Supple, No limited ROM  Chest - CTA bilaterally, No wheeze, No rhonchi, No crackles  Cardiovascular - RRR, S1S2, No murmurs  Abdomen - BS+, Soft, NTND  Extremities - No C/C/E, No calf tenderness   MSK: No pain with ROM right hip   Neurologic Exam -                      Motor - unable to test RLE due to pain but moves antigravity                        Sensory - Decreased  to LT L2/L3/L4     Reflexes - DTR Intact, No primitive reflexive     Balance -not tested   Psychiatric - Mood stable, Affect WNL

## 2018-07-14 NOTE — CONSULT NOTE ADULT - SUBJECTIVE AND OBJECTIVE BOX
Orthopedic Spine Surgery Note    48M p/w LBP and RLE radiculopathy. Reports sudden onset of LBP while shoveling snow in January. Pain located in R side of lower back radiating down R buttock into posterior thigh/leg and dorsum of R foot. Also reports numbness/tingling throughout RLE. Denies bowel/bladder dysfunction but has constipation 2/2 chronic opioid use. No saddle anesthesia. Had 2 LESI in the end of March with only minimal temporary relief in sx. Also given short course of CS with min relief. Had an MRI done at [place] in May which showed L3-L5 disc herniations, per the patient's report. Says that since this Wednesday the pain has gotten worse and now he has difficulty with ambulation. No recent fever/chills/infection. No recent trauma. No other complaints.    HEALTH ISSUES - PROBLEM Dx:  Prophylactic measure: Prophylactic measure  Hypercholesteremia: Hypercholesteremia  Type 2 diabetes mellitus with complication, unspecified whether long term insulin use: Type 2 diabetes mellitus with complication, unspecified whether long term insulin use  Hypokalemia: Hypokalemia  Acute right-sided low back pain with right-sided sciatica: Acute right-sided low back pain with right-sided sciatica          MEDICATIONS  (STANDING):  acetaminophen   Tablet. 650 milliGRAM(s) Oral every 8 hours  acetaminophen  IVPB. 1000 milliGRAM(s) IV Intermittent once  dextrose 5%. 1000 milliLiter(s) IV Continuous <Continuous>  dextrose 50% Injectable 12.5 Gram(s) IV Push once  dextrose 50% Injectable 25 Gram(s) IV Push once  dextrose 50% Injectable 25 Gram(s) IV Push once  docusate sodium 100 milliGRAM(s) Oral three times a day  heparin  Injectable 5000 Unit(s) SubCutaneous every 8 hours  insulin lispro (HumaLOG) corrective regimen sliding scale   SubCutaneous three times a day before meals  insulin lispro (HumaLOG) corrective regimen sliding scale   SubCutaneous at bedtime  lidocaine   Patch 1 Patch Transdermal daily  methylPREDNISolone   Oral   methylPREDNISolone 4 milliGRAM(s) Oral before breakfast  methylPREDNISolone 4 milliGRAM(s) Oral at bedtime  polyethylene glycol 3350 17 Gram(s) Oral daily  pregabalin 50 milliGRAM(s) Oral three times a day  senna 2 Tablet(s) Oral at bedtime  simvastatin 20 milliGRAM(s) Oral at bedtime      Allergies    No Known Allergies    Intolerances    pork (Other)      Gen: NAD  Neck/Back: skin intact, no deformity  no midline TTP C/T/L/S, no palpable step offs    Neuro:    Motor:                   C5                C6              C7               C8           T1   R            5/5                5/5            5/5             5/5          5/5  L             5/5               5/5             5/5             5/5          5/5                L2             L3             L4               L5            S1  R         4+/5        4+/5          5/5             5/5           5/5   L          5/5          5/5           5/5             5/5           5/5    Sensory:            C5         C6         C7      C8       T1        (0=absent, 1=impaired, 2=normal, NT=not testable)  R         2            2           2        2         2  L          2            2           2        2         2               L2          L3         L4      L5       S1         (0=absent, 1=impaired, 2=normal, NT=not testable)  R         2            2            2        2        2  L          2            2           2        2         2    Negative clonus  Negative babinski  Negative mcnally  No saddle anesthesia    Imaging:   < from: MR Lumbar Spine No Cont (07.13.18 @ 20:53) >  INTERPRETATION:  History: Back pain. Diabetes. Hyperlipidemia. Sciatica.    Description: A noncontrast MRI of the lumbar spine was performed.    Comparison is made to the lumbar spine x-ray series from 07/11/2018.    Sagittal T1/T2/STIR, axial T2, axial T1, and coronal T2 series were   obtained.    6 lumbar type non-rib bearing vertebral bodies are noted, as described on   the lumbar spine x-ray series from 07/11/2018. The last well-formed disc   space is designated as L5-S1 for the purpose of this dictation. This   places the conus of the spinal cord at the T12-L1 level.    There is no evidence for vertebral body compression fracture or   subluxation. There is no lower thoracic spinal cord compression. Disc   desiccation changes are present at the L3-L4, L4-L5, and L5-S1 levels.    L1-L2: No significant central canal stenosis or neural foraminal   narrowing is present.    L2-L3: No significant central canal stenosis or neural foraminal   narrowing is present.    L3-L4: A moderate disc bulge and central disc herniation is present with   severe central canal stenosis. Mild to moderate bilateral neural   foraminal narrowing is present.    L4-L5: A large disc herniationwith extrusion is present resulting in   severe spinal canal stenosis. Mild to moderate bilateral neural foraminal   narrowing is present.    L5-S1: A small broad disc bulge and mild central canal stenosis are   present.    I discussed the exam findings with the covering nurse practitioner Francine   at 5:30 PM on 07/14/2018 with read back.    IMPRESSION:    Severe spinal canal stenosis is present at the L3-L4 and L4-L5 levels   secondary to prominent disc herniations as described.    6 lumbar type non-rib bearing vertebral bodies are noted, as described on   the lumbar spine x-ray series from 07/11/2018. The last well-formed disc   space is designated as L5-S1 for the purpose of this dictation. This   places the conus of the spinal cord at the T12-L1 level.    < end of copied text >      AP: 48yMale with L3-5 HNP and lumbar spinal stenosis  Pain control  WBAT with assistive devices as needed  FU Labs/imaging  will d/w attending spine surgeon Orthopedic Spine Surgery Note    48M p/w LBP and RLE radiculopathy. Reports sudden onset of LBP while shoveling snow in January. Pain located in R side of lower back radiating down R buttock into posterior thigh/leg and dorsum of R foot. Also reports numbness/tingling throughout RLE. Denies bowel/bladder dysfunction but has constipation 2/2 chronic opioid use. No saddle anesthesia. Had 2 LESI in the end of March with only minimal temporary relief in sx. Also given short course of CS with min relief. Had an MRI done at [place] in May which showed L3-L5 disc herniations, per the patient's report. Says that since this Wednesday the pain has gotten worse and now he has difficulty with ambulation. No recent fever/chills/infection. No recent trauma. No other complaints.    HEALTH ISSUES - PROBLEM Dx:  Prophylactic measure: Prophylactic measure  Hypercholesteremia: Hypercholesteremia  Type 2 diabetes mellitus with complication, unspecified whether long term insulin use: Type 2 diabetes mellitus with complication, unspecified whether long term insulin use  Hypokalemia: Hypokalemia  Acute right-sided low back pain with right-sided sciatica: Acute right-sided low back pain with right-sided sciatica          MEDICATIONS  (STANDING):  acetaminophen   Tablet. 650 milliGRAM(s) Oral every 8 hours  acetaminophen  IVPB. 1000 milliGRAM(s) IV Intermittent once  dextrose 5%. 1000 milliLiter(s) IV Continuous <Continuous>  dextrose 50% Injectable 12.5 Gram(s) IV Push once  dextrose 50% Injectable 25 Gram(s) IV Push once  dextrose 50% Injectable 25 Gram(s) IV Push once  docusate sodium 100 milliGRAM(s) Oral three times a day  heparin  Injectable 5000 Unit(s) SubCutaneous every 8 hours  insulin lispro (HumaLOG) corrective regimen sliding scale   SubCutaneous three times a day before meals  insulin lispro (HumaLOG) corrective regimen sliding scale   SubCutaneous at bedtime  lidocaine   Patch 1 Patch Transdermal daily  methylPREDNISolone   Oral   methylPREDNISolone 4 milliGRAM(s) Oral before breakfast  methylPREDNISolone 4 milliGRAM(s) Oral at bedtime  polyethylene glycol 3350 17 Gram(s) Oral daily  pregabalin 50 milliGRAM(s) Oral three times a day  senna 2 Tablet(s) Oral at bedtime  simvastatin 20 milliGRAM(s) Oral at bedtime      Allergies    No Known Allergies    Intolerances    pork (Other)      Gen: NAD  Neck/Back: skin intact, no deformity  no midline TTP C/T/L/S, no palpable step offs    Neuro:    Motor:                   C5                C6              C7               C8           T1   R            5/5                5/5            5/5             5/5          5/5  L             5/5               5/5             5/5             5/5          5/5                L2             L3             L4               L5            S1  R         4+/5        4+/5          5/5             5/5           5/5   L          5/5          5/5           5/5             5/5           5/5    Sensory:            C5         C6         C7      C8       T1        (0=absent, 1=impaired, 2=normal, NT=not testable)  R         2            2           2        2         2  L          2            2           2        2         2               L2          L3         L4      L5       S1         (0=absent, 1=impaired, 2=normal, NT=not testable)  R         2            2            2        2        2  L          2            2           2        2         2    Negative clonus  Negative babinski  Negative mcnally  No saddle anesthesia    Imaging:   < from: MR Lumbar Spine No Cont (07.13.18 @ 20:53) >  INTERPRETATION:  History: Back pain. Diabetes. Hyperlipidemia. Sciatica.    Description: A noncontrast MRI of the lumbar spine was performed.    Comparison is made to the lumbar spine x-ray series from 07/11/2018.    Sagittal T1/T2/STIR, axial T2, axial T1, and coronal T2 series were   obtained.    6 lumbar type non-rib bearing vertebral bodies are noted, as described on   the lumbar spine x-ray series from 07/11/2018. The last well-formed disc   space is designated as L5-S1 for the purpose of this dictation. This   places the conus of the spinal cord at the T12-L1 level.    There is no evidence for vertebral body compression fracture or   subluxation. There is no lower thoracic spinal cord compression. Disc   desiccation changes are present at the L3-L4, L4-L5, and L5-S1 levels.    L1-L2: No significant central canal stenosis or neural foraminal   narrowing is present.    L2-L3: No significant central canal stenosis or neural foraminal   narrowing is present.    L3-L4: A moderate disc bulge and central disc herniation is present with   severe central canal stenosis. Mild to moderate bilateral neural   foraminal narrowing is present.    L4-L5: A large disc herniationwith extrusion is present resulting in   severe spinal canal stenosis. Mild to moderate bilateral neural foraminal   narrowing is present.    L5-S1: A small broad disc bulge and mild central canal stenosis are   present.    I discussed the exam findings with the covering nurse practitioner Francine   at 5:30 PM on 07/14/2018 with read back.    IMPRESSION:    Severe spinal canal stenosis is present at the L3-L4 and L4-L5 levels   secondary to prominent disc herniations as described.    6 lumbar type non-rib bearing vertebral bodies are noted, as described on   the lumbar spine x-ray series from 07/11/2018. The last well-formed disc   space is designated as L5-S1 for the purpose of this dictation. This   places the conus of the spinal cord at the T12-L1 level.    < end of copied text >      A/P: 48y Male with L3-5 HNP and lumbar spinal stenosis  Pain control  WBAT with assistive devices as needed  FU Labs/imaging  NPO pMN/IVF  Will need medical clearance prior to OR  Transfer to [facility] for L3-5 laminectomy/discectomy tomorrow

## 2018-07-14 NOTE — PROGRESS NOTE ADULT - PROVIDER SPECIALTY LIST ADULT
Internal Medicine
Rehab Medicine
Internal Medicine

## 2018-07-14 NOTE — CHART NOTE - NSCHARTNOTEFT_GEN_A_CORE
Radiologist attending called via MRI spine imaging resulted with a two-level significant disc herniation in L3-4 and L4-5 along with severe spinal stenosis. Called orthopedic/spine for consult who was made aware of the imaging result - will follow up their recommendations. Radiologist attending called at 1740 via MRI spine imaging resulted with a two-level significant disc herniation in L3-4 and L4-5 along with severe spinal stenosis. Called orthopedic/spine for consult at 1750 who was made aware of the imaging result - will follow up their recommendations. Radiologist attending called at 1740 via MRI spine imaging resulted with a two-level significant disc herniation in L3-4 and L4-5 along with severe spinal stenosis. Called orthopedic/spine for consult at 1750 who was made aware of the imaging result - will follow up their recommendations. Wife was called and made aware of the MRI results as well as the patient's plan of care which is pending orthopedic/spine evaluation. I also discussed with primary RN who was made aware of the MRI results and the current plan of care.    Thank you,  Francine FRANCIS NP  primary team  spectra today [phone] Radiologist attending called at 1740 via MRI spine imaging resulted with a two-level significant disc herniation in L3-4 and L4-5 along with severe spinal stenosis. Called orthopedic/spine for consult at 1745 who was made aware of the imaging result - will follow up their recommendations. Wife was called and made aware of the MRI results as well as the patient's plan of care which is pending orthopedic/spine evaluation. I also discussed with primary RN who was made aware of the MRI results and the current plan of care.    Thank you,  Francine FRANCIS NP  primary team  spectra today [phone] Radiologist attending called at 1740 via MRI spine imaging resulted with a two-level significant disc herniation in L3-4 and L4-5 along with severe spinal stenosis. Called orthopedic/spine for consult at 1745 who was made aware of the imaging result - will follow up their recommendations. Wife was called and made aware of the MRI results as well as the patient's plan of care which is pending orthopedic/spine evaluation. I also discussed with primary RN who was made aware of the MRI results and the current plan of care. Attending Dr. Jacques was called and made aware of the MRI spine imaging results and agrees with plan of care    Thank you,  Francine FRANCIS NP  primary team  spectra today [phone]

## 2018-07-14 NOTE — CHART NOTE - NSCHARTNOTEFT_GEN_A_CORE
Patient was seen and examined in bed with daughter at bedside, c/o pain 5/10, NAD noted. Patient was seen by Ortho- Surgery pending surgery tomorrow at [facility]. Patient was seen and examined in bed with daughter at bedside, c/o pain 5/10, NAD noted. Patient was previously seen by Ortho  pending transfer and surgery tomorrow at [facility].  Time of transfer and receiving Md unknown at this time. Will continue to monitor.    Julieta Brand, ANP-BC  [phone]

## 2018-07-15 ENCOUNTER — RESULT REVIEW (OUTPATIENT)
Age: 48
End: 2018-07-15

## 2018-07-15 ENCOUNTER — INPATIENT (INPATIENT)
Facility: HOSPITAL | Age: 48
LOS: 7 days | Discharge: HOME | DRG: 519 | End: 2018-07-23
Attending: INTERNAL MEDICINE | Admitting: INTERNAL MEDICINE
Payer: COMMERCIAL

## 2018-07-15 VITALS
RESPIRATION RATE: 17 BRPM | DIASTOLIC BLOOD PRESSURE: 71 MMHG | OXYGEN SATURATION: 97 % | SYSTOLIC BLOOD PRESSURE: 112 MMHG | HEART RATE: 97 BPM | TEMPERATURE: 98 F

## 2018-07-15 VITALS
TEMPERATURE: 98 F | SYSTOLIC BLOOD PRESSURE: 141 MMHG | DIASTOLIC BLOOD PRESSURE: 97 MMHG | HEART RATE: 86 BPM | OXYGEN SATURATION: 96 % | RESPIRATION RATE: 18 BRPM

## 2018-07-15 DIAGNOSIS — Z98.890 OTHER SPECIFIED POSTPROCEDURAL STATES: Chronic | ICD-10-CM

## 2018-07-15 DIAGNOSIS — M51.9 UNSPECIFIED THORACIC, THORACOLUMBAR AND LUMBOSACRAL INTERVERTEBRAL DISC DISORDER: ICD-10-CM

## 2018-07-15 LAB
ANION GAP SERPL CALC-SCNC: 14 MMOL/L — SIGNIFICANT CHANGE UP (ref 5–17)
APPEARANCE UR: CLEAR — SIGNIFICANT CHANGE UP
APTT BLD: 29 SEC — SIGNIFICANT CHANGE UP (ref 27.5–37.4)
BILIRUB UR-MCNC: NEGATIVE — SIGNIFICANT CHANGE UP
BLD GP AB SCN SERPL QL: NEGATIVE — SIGNIFICANT CHANGE UP
BUN SERPL-MCNC: 18 MG/DL — SIGNIFICANT CHANGE UP (ref 7–23)
CALCIUM SERPL-MCNC: 9.9 MG/DL — SIGNIFICANT CHANGE UP (ref 8.4–10.5)
CHLORIDE SERPL-SCNC: 96 MMOL/L — SIGNIFICANT CHANGE UP (ref 96–108)
CO2 SERPL-SCNC: 27 MMOL/L — SIGNIFICANT CHANGE UP (ref 22–31)
COLOR SPEC: YELLOW — SIGNIFICANT CHANGE UP
CREAT SERPL-MCNC: 1.18 MG/DL — SIGNIFICANT CHANGE UP (ref 0.5–1.3)
DIFF PNL FLD: NEGATIVE — SIGNIFICANT CHANGE UP
GLUCOSE BLDC GLUCOMTR-MCNC: 112 MG/DL — HIGH (ref 70–99)
GLUCOSE BLDC GLUCOMTR-MCNC: 148 MG/DL — HIGH (ref 70–99)
GLUCOSE BLDC GLUCOMTR-MCNC: 210 MG/DL — HIGH (ref 70–99)
GLUCOSE SERPL-MCNC: 169 MG/DL — HIGH (ref 70–99)
GLUCOSE UR QL: NEGATIVE — SIGNIFICANT CHANGE UP
HCT VFR BLD CALC: 52.5 % — HIGH (ref 39–50)
HGB BLD-MCNC: 17.1 G/DL — HIGH (ref 13–17)
INR BLD: 1.1 RATIO — SIGNIFICANT CHANGE UP (ref 0.88–1.16)
KETONES UR-MCNC: NEGATIVE — SIGNIFICANT CHANGE UP
LEUKOCYTE ESTERASE UR-ACNC: NEGATIVE — SIGNIFICANT CHANGE UP
MCHC RBC-ENTMCNC: 30.6 PG — SIGNIFICANT CHANGE UP (ref 27–34)
MCHC RBC-ENTMCNC: 32.6 GM/DL — SIGNIFICANT CHANGE UP (ref 32–36)
MCV RBC AUTO: 93.9 FL — SIGNIFICANT CHANGE UP (ref 80–100)
NITRITE UR-MCNC: NEGATIVE — SIGNIFICANT CHANGE UP
PH UR: 6.5 — SIGNIFICANT CHANGE UP (ref 5–8)
PLATELET # BLD AUTO: 229 K/UL — SIGNIFICANT CHANGE UP (ref 150–400)
POTASSIUM SERPL-MCNC: 4.2 MMOL/L — SIGNIFICANT CHANGE UP (ref 3.5–5.3)
POTASSIUM SERPL-SCNC: 4.2 MMOL/L — SIGNIFICANT CHANGE UP (ref 3.5–5.3)
PROT UR-MCNC: SIGNIFICANT CHANGE UP
PROTHROM AB SERPL-ACNC: 12 SEC — SIGNIFICANT CHANGE UP (ref 9.8–12.7)
RBC # BLD: 5.59 M/UL — SIGNIFICANT CHANGE UP (ref 4.2–5.8)
RBC # FLD: 11.8 % — SIGNIFICANT CHANGE UP (ref 10.3–14.5)
RH IG SCN BLD-IMP: POSITIVE — SIGNIFICANT CHANGE UP
RH IG SCN BLD-IMP: POSITIVE — SIGNIFICANT CHANGE UP
SODIUM SERPL-SCNC: 137 MMOL/L — SIGNIFICANT CHANGE UP (ref 135–145)
SP GR SPEC: 1.02 — SIGNIFICANT CHANGE UP (ref 1.01–1.02)
UROBILINOGEN FLD QL: NEGATIVE — SIGNIFICANT CHANGE UP
WBC # BLD: 7.4 K/UL — SIGNIFICANT CHANGE UP (ref 3.8–10.5)
WBC # FLD AUTO: 7.4 K/UL — SIGNIFICANT CHANGE UP (ref 3.8–10.5)

## 2018-07-15 PROCEDURE — 71045 X-RAY EXAM CHEST 1 VIEW: CPT | Mod: 26

## 2018-07-15 PROCEDURE — 72020 X-RAY EXAM OF SPINE 1 VIEW: CPT | Mod: 26

## 2018-07-15 PROCEDURE — 93010 ELECTROCARDIOGRAM REPORT: CPT

## 2018-07-15 PROCEDURE — 88304 TISSUE EXAM BY PATHOLOGIST: CPT | Mod: 26

## 2018-07-15 RX ORDER — SODIUM CHLORIDE 9 MG/ML
1000 INJECTION, SOLUTION INTRAVENOUS
Qty: 0 | Refills: 0 | Status: DISCONTINUED | OUTPATIENT
Start: 2018-07-15 | End: 2018-07-15

## 2018-07-15 RX ORDER — ACETAMINOPHEN 500 MG
650 TABLET ORAL EVERY 6 HOURS
Qty: 0 | Refills: 0 | Status: DISCONTINUED | OUTPATIENT
Start: 2018-07-15 | End: 2018-07-23

## 2018-07-15 RX ORDER — HYDROMORPHONE HYDROCHLORIDE 2 MG/ML
0.5 INJECTION INTRAMUSCULAR; INTRAVENOUS; SUBCUTANEOUS
Qty: 0 | Refills: 0 | Status: DISCONTINUED | OUTPATIENT
Start: 2018-07-15 | End: 2018-07-15

## 2018-07-15 RX ORDER — DEXTROSE 50 % IN WATER 50 %
15 SYRINGE (ML) INTRAVENOUS ONCE
Qty: 0 | Refills: 0 | Status: DISCONTINUED | OUTPATIENT
Start: 2018-07-15 | End: 2018-07-15

## 2018-07-15 RX ORDER — DEXTROSE 50 % IN WATER 50 %
25 SYRINGE (ML) INTRAVENOUS ONCE
Qty: 0 | Refills: 0 | Status: DISCONTINUED | OUTPATIENT
Start: 2018-07-15 | End: 2018-07-15

## 2018-07-15 RX ORDER — SODIUM CHLORIDE 9 MG/ML
1000 INJECTION, SOLUTION INTRAVENOUS
Qty: 0 | Refills: 0 | Status: DISCONTINUED | OUTPATIENT
Start: 2018-07-15 | End: 2018-07-23

## 2018-07-15 RX ORDER — SIMVASTATIN 20 MG/1
20 TABLET, FILM COATED ORAL AT BEDTIME
Qty: 0 | Refills: 0 | Status: DISCONTINUED | OUTPATIENT
Start: 2018-07-15 | End: 2018-07-23

## 2018-07-15 RX ORDER — INSULIN LISPRO 100/ML
VIAL (ML) SUBCUTANEOUS AT BEDTIME
Qty: 0 | Refills: 0 | Status: DISCONTINUED | OUTPATIENT
Start: 2018-07-15 | End: 2018-07-23

## 2018-07-15 RX ORDER — SODIUM CHLORIDE 9 MG/ML
1000 INJECTION, SOLUTION INTRAVENOUS
Qty: 0 | Refills: 0 | Status: DISCONTINUED | OUTPATIENT
Start: 2018-07-15 | End: 2018-07-16

## 2018-07-15 RX ORDER — DIAZEPAM 5 MG
5 TABLET ORAL EVERY 8 HOURS
Qty: 0 | Refills: 0 | Status: DISCONTINUED | OUTPATIENT
Start: 2018-07-15 | End: 2018-07-22

## 2018-07-15 RX ORDER — MORPHINE SULFATE 50 MG/1
2 CAPSULE, EXTENDED RELEASE ORAL EVERY 4 HOURS
Qty: 0 | Refills: 0 | Status: DISCONTINUED | OUTPATIENT
Start: 2018-07-15 | End: 2018-07-15

## 2018-07-15 RX ORDER — CEFAZOLIN SODIUM 1 G
2000 VIAL (EA) INJECTION EVERY 8 HOURS
Qty: 0 | Refills: 0 | Status: DISCONTINUED | OUTPATIENT
Start: 2018-07-15 | End: 2018-07-21

## 2018-07-15 RX ORDER — INSULIN LISPRO 100/ML
VIAL (ML) SUBCUTANEOUS
Qty: 0 | Refills: 0 | Status: DISCONTINUED | OUTPATIENT
Start: 2018-07-15 | End: 2018-07-23

## 2018-07-15 RX ORDER — INSULIN LISPRO 100/ML
VIAL (ML) SUBCUTANEOUS
Qty: 0 | Refills: 0 | Status: DISCONTINUED | OUTPATIENT
Start: 2018-07-15 | End: 2018-07-15

## 2018-07-15 RX ORDER — GLUCAGON INJECTION, SOLUTION 0.5 MG/.1ML
1 INJECTION, SOLUTION SUBCUTANEOUS ONCE
Qty: 0 | Refills: 0 | Status: DISCONTINUED | OUTPATIENT
Start: 2018-07-15 | End: 2018-07-23

## 2018-07-15 RX ORDER — DEXTROSE 50 % IN WATER 50 %
12.5 SYRINGE (ML) INTRAVENOUS ONCE
Qty: 0 | Refills: 0 | Status: DISCONTINUED | OUTPATIENT
Start: 2018-07-15 | End: 2018-07-15

## 2018-07-15 RX ORDER — FAMOTIDINE 10 MG/ML
20 INJECTION INTRAVENOUS EVERY 12 HOURS
Qty: 0 | Refills: 0 | Status: DISCONTINUED | OUTPATIENT
Start: 2018-07-15 | End: 2018-07-23

## 2018-07-15 RX ORDER — DIPHENHYDRAMINE HCL 50 MG
12.5 CAPSULE ORAL EVERY 4 HOURS
Qty: 0 | Refills: 0 | Status: DISCONTINUED | OUTPATIENT
Start: 2018-07-15 | End: 2018-07-23

## 2018-07-15 RX ORDER — CYCLOBENZAPRINE HYDROCHLORIDE 10 MG/1
10 TABLET, FILM COATED ORAL THREE TIMES A DAY
Qty: 0 | Refills: 0 | Status: DISCONTINUED | OUTPATIENT
Start: 2018-07-15 | End: 2018-07-23

## 2018-07-15 RX ORDER — HYDROMORPHONE HYDROCHLORIDE 2 MG/ML
1 INJECTION INTRAMUSCULAR; INTRAVENOUS; SUBCUTANEOUS
Qty: 0 | Refills: 0 | Status: DISCONTINUED | OUTPATIENT
Start: 2018-07-15 | End: 2018-07-16

## 2018-07-15 RX ORDER — DEXTROSE 50 % IN WATER 50 %
12.5 SYRINGE (ML) INTRAVENOUS ONCE
Qty: 0 | Refills: 0 | Status: DISCONTINUED | OUTPATIENT
Start: 2018-07-15 | End: 2018-07-23

## 2018-07-15 RX ORDER — ACETAMINOPHEN 500 MG
1000 TABLET ORAL ONCE
Qty: 0 | Refills: 0 | Status: COMPLETED | OUTPATIENT
Start: 2018-07-15 | End: 2018-07-15

## 2018-07-15 RX ORDER — DEXTROSE 50 % IN WATER 50 %
25 SYRINGE (ML) INTRAVENOUS ONCE
Qty: 0 | Refills: 0 | Status: DISCONTINUED | OUTPATIENT
Start: 2018-07-15 | End: 2018-07-23

## 2018-07-15 RX ORDER — PANTOPRAZOLE SODIUM 20 MG/1
40 TABLET, DELAYED RELEASE ORAL DAILY
Qty: 0 | Refills: 0 | Status: DISCONTINUED | OUTPATIENT
Start: 2018-07-15 | End: 2018-07-15

## 2018-07-15 RX ORDER — DEXTROSE 50 % IN WATER 50 %
15 SYRINGE (ML) INTRAVENOUS ONCE
Qty: 0 | Refills: 0 | Status: DISCONTINUED | OUTPATIENT
Start: 2018-07-15 | End: 2018-07-23

## 2018-07-15 RX ORDER — GLUCAGON INJECTION, SOLUTION 0.5 MG/.1ML
1 INJECTION, SOLUTION SUBCUTANEOUS ONCE
Qty: 0 | Refills: 0 | Status: DISCONTINUED | OUTPATIENT
Start: 2018-07-15 | End: 2018-07-15

## 2018-07-15 RX ORDER — MAGNESIUM HYDROXIDE 400 MG/1
30 TABLET, CHEWABLE ORAL EVERY 12 HOURS
Qty: 0 | Refills: 0 | Status: DISCONTINUED | OUTPATIENT
Start: 2018-07-15 | End: 2018-07-23

## 2018-07-15 RX ADMIN — HYDROMORPHONE HYDROCHLORIDE 0.5 MILLIGRAM(S): 2 INJECTION INTRAMUSCULAR; INTRAVENOUS; SUBCUTANEOUS at 22:23

## 2018-07-15 RX ADMIN — MORPHINE SULFATE 2 MILLIGRAM(S): 50 CAPSULE, EXTENDED RELEASE ORAL at 11:02

## 2018-07-15 RX ADMIN — MORPHINE SULFATE 2 MILLIGRAM(S): 50 CAPSULE, EXTENDED RELEASE ORAL at 11:22

## 2018-07-15 RX ADMIN — Medication 400 MILLIGRAM(S): at 20:30

## 2018-07-15 RX ADMIN — PANTOPRAZOLE SODIUM 40 MILLIGRAM(S): 20 TABLET, DELAYED RELEASE ORAL at 12:16

## 2018-07-15 RX ADMIN — Medication 5 MILLIGRAM(S): at 21:01

## 2018-07-15 RX ADMIN — Medication 100 MILLIGRAM(S): at 20:44

## 2018-07-15 RX ADMIN — HYDROMORPHONE HYDROCHLORIDE 0.5 MILLIGRAM(S): 2 INJECTION INTRAMUSCULAR; INTRAVENOUS; SUBCUTANEOUS at 22:09

## 2018-07-15 RX ADMIN — SODIUM CHLORIDE 75 MILLILITER(S): 9 INJECTION, SOLUTION INTRAVENOUS at 20:30

## 2018-07-15 RX ADMIN — Medication 1 TABLET(S): at 22:19

## 2018-07-15 RX ADMIN — SODIUM CHLORIDE 100 MILLILITER(S): 9 INJECTION, SOLUTION INTRAVENOUS at 12:29

## 2018-07-15 RX ADMIN — SIMVASTATIN 20 MILLIGRAM(S): 20 TABLET, FILM COATED ORAL at 22:19

## 2018-07-15 RX ADMIN — Medication 50 MILLIGRAM(S): at 22:14

## 2018-07-15 RX ADMIN — LIDOCAINE 1 PATCH: 4 CREAM TOPICAL at 00:19

## 2018-07-15 RX ADMIN — Medication 1000 MILLIGRAM(S): at 21:00

## 2018-07-15 NOTE — H&P ADULT - ENDOCRINE
Message  Pt called she is c/o of vulvar cyst being larger and has more pain was told to call for antibiotic if it got worse it is worse Spoke to Dr Vivien Cast he will call in Cipro rx called [facility]      Active Problems    1  Acute bronchitis, unspecified organism (466 0) (J20 9)   2  Allergic rhinitis (477 9) (J30 9)   3  Bacterial vaginosis (616 10,041 9) (N76 0,B96 89)   4  Depression (311) (F32 9)   5  Diarrhea (787 91) (R19 7)   6  Dyslipidemia (272 4) (E78 5)   7  Encounter for routine gynecological examination (V72 31) (Z01 419)   8  Encounter for screening mammogram for malignant neoplasm of breast (V76 12)   (Z12 31)   9  Endometriosis (617 9) (N80 9)   10  Fatigue (780 79) (R53 83)   11  Greater trochanteric bursitis of both hips (726 5) (M70 61,M70 62)   12  Heel pain (729 5) (M79 673)   13  Hydradenitis (705 83) (L73 2)   14  Hyperglycemia (790 29) (R73 9)   15  Hypertension (401 9) (I10)   16  Hypokalemia (276 8) (E87 6)   17  Hypothyroidism (244 9) (E03 9)   18  Low back pain (724 2) (M54 5)   19  Lumbar radiculopathy (724 4) (M54 16)   20  Lumbar spondylosis (721 3) (M47 816)   21  Lumbosacral spondylosis (721 3) (M47 817)   22  Need for prophylactic vaccination and inoculation against influenza (V04 81) (Z23)   23  MARIE (obstructive sleep apnea) (327 23) (G47 33)   24  Right upper quadrant pain (789 01) (R10 11)   25  Sacroiliitis (720 2) (M46 1)   26  Trochanteric bursitis, unspecified laterality (726 5) (M70 60)   27  Vitamin B12 deficiency (266 2) (E53 8)   28  Vulvar cyst (624 8) (N90 7)    Current Meds   1  Adult Aspirin Low Strength 81 MG TBDP; CHEW AND SWALLOW 1 TABLET DAILY AS   DIRECTED Recorded   2  Atenolol-Chlorthalidone 50-25 MG Oral Tablet; take 1 tablet by mouth every day; Therapy: [id number] to (Evaluate:[id number])  Requested for: [id number]; Last   Rx:[id number] Ordered   3  B-12 1000 MCG Oral Capsule; Therapy: [id number] to Recorded   4  Claritin 10 MG Oral Tablet Recorded   5  Zantac 75 TABS Recorded    Allergies    1  Penicillins   2  Erythromycin TABS   3  Toradol SOLN   4  Ventolin HFA   5   Vicodin TABS    Plan  Vulvar cyst    · Ciprofloxacin HCl - 500 MG Oral Tablet; TAKE 1 TABLET TWICE DAILY    Signatures   Electronically signed by : Bhavin Larkin, ; Dec 15 2016  3:28PM EST                       (Author) negative

## 2018-07-15 NOTE — CHART NOTE - NSCHARTNOTEFT_GEN_A_CORE
Ortho Resident was paged and confirmed that Attending Dr. Jacques was aware of patient's transfer to [facility] for surgery in AM  with Dr Genao. Discharge completed and transfer consent obtained from patient.

## 2018-07-15 NOTE — CONSULT NOTE ADULT - SUBJECTIVE AND OBJECTIVE BOX
CHIEF COMPLAINT:    HPI:  47 y/o male with past medical history DM2, HTN, HLD, Multiple lumbar disc herniations (L3-L4/L4-L5/L5-S1) presents to the ED in [facility]  with complaint of worsening lower back pain. Patient states he was walking  2 days prior when he felt worsening lower back pain described as "achy and sharp" originating in the right lower back region with radiation down the  thigh, posterior leg, and dorsum of the foot.  Patient rated the pain 10/10 in severity. Denies fevers, chills, incontinence, dysuria. Notes that right leg numbness worsening mri with mult disc herniations/ spinal stenosis awaiting sx today w/ortho spine.    Pt denies any prior history of cardiac disease. he denies any recent chest pain, dyspnea, palpitations, orthopnea or PND    ECG NSR w LVH w no ischemic changes      PAST MEDICAL & SURGICAL HISTORY:  Hypercholesteremia  Diabetes mellitus: ~ diagnosed during week of 08/13 - 19/2017  Abscess  H/O arthroscopy of right knee          PREVIOUS DIAGNOSTIC TESTING:    [ ] Echocardiogram:  [ ]  Catheterization:  [ ] Stress Test:  	    MEDICATIONS:  MEDICATIONS  (STANDING):  dextrose 5%. 1000 milliLiter(s) (50 mL/Hr) IV Continuous <Continuous>  dextrose 50% Injectable 12.5 Gram(s) IV Push once  dextrose 50% Injectable 25 Gram(s) IV Push once  dextrose 50% Injectable 25 Gram(s) IV Push once  insulin lispro (HumaLOG) corrective regimen sliding scale   SubCutaneous three times a day before meals  pantoprazole  Injectable 40 milliGRAM(s) IV Push daily  sodium chloride 0.45%. 1000 milliLiter(s) (100 mL/Hr) IV Continuous <Continuous>      FAMILY HISTORY:  Family history of heart failure: father  Family history of breast cancer (Aunt, Aunt)  Family history of hypertension (Grandparent): grandmother, mother  Family history of cerebrovascular accident (CVA) (Grandparent): grandmother  Family history of diabetes mellitus (Sibling): parents, sister      SOCIAL HISTORY:    [ ] Non-smoker  [ ] Smoker  [ ] Alcohol    Allergies    No Known Allergies    Intolerances    pork (Other)  	    REVIEW OF SYSTEMS:  CONSTITUTIONAL: No fever, weight loss, or fatigue  EYES: No eye pain, visual disturbances, or discharge  ENMT:  No difficulty hearing, tinnitus, vertigo; No sinus or throat pain  NECK: No pain or stiffness  RESPIRATORY: No cough, wheezing, chills or hemoptysis; No Shortness of Breath  CARDIOVASCULAR: see HPI  GASTROINTESTINAL: No abdominal or epigastric pain. No nausea, vomiting, or hematemesis; No diarrhea or constipation. No melena or hematochezia.  GENITOURINARY: No dysuria, frequency, hematuria, or incontinence  NEUROLOGICAL: No headaches, memory loss, loss of strength, numbness, or tremors  SKIN: No itching, burning, rashes, or lesions   	    [ ] All others negative	  [ ] Unable to obtain    PHYSICAL EXAM:  T(C): 36.4 (07-15-18 @ 05:52), Max: 36.8 (07-14-18 @ 21:31)  HR: 80 (07-15-18 @ 05:52) (75 - 97)  BP: 124/87 (07-15-18 @ 05:52) (112/71 - 144/96)  RR: 18 (07-15-18 @ 05:52) (17 - 18)  SpO2: 93% (07-15-18 @ 05:52) (93% - 97%)  Wt(kg): --  I&O's Summary    14 Jul 2018 07:01  -  15 Jul 2018 07:00  --------------------------------------------------------  IN: 500 mL / OUT: 500 mL / NET: 0 mL        Appearance: Normal	  Psychiatry: A & O x 3, Mood & affect appropriate  HEENT:   Normal oral mucosa, PERRL, EOMI	  Lymphatic: No lymphadenopathy  Cardiovascular: Normal S1 S2,RRR, No JVD, No murmurs  Respiratory: Lungs clear to auscultation	  Gastrointestinal:  Soft, Non-tender, + BS	  Skin: No rashes, No ecchymoses, No cyanosis	  Neurologic: Non-focal  Extremities: Normal range of motion, No clubbing, cyanosis or edema  Vascular: Peripheral pulses palpable 2+ bilaterally    TELEMETRY: 	    ECG:  	  RADIOLOGY:  OTHER: 	  	  LABS:	 	    CARDIAC MARKERS:                                  17.1   7.4   )-----------( 229      ( 15 Jul 2018 05:51 )             52.5     07-15    137  |  96  |  18  ----------------------------<  169<H>  4.2   |  27  |  1.18    Ca    9.9      15 Jul 2018 05:51      PT/INR - ( 15 Jul 2018 05:51 )   PT: 12.0 sec;   INR: 1.10 ratio         PTT - ( 15 Jul 2018 05:51 )  PTT:29.0 sec  proBNP:   Lipid Profile:   HgA1c:   TSH:     ASSESSMENT/PLAN:
48M transfer from [facility] for surgery for LBP w/ radicular symptoms. Pt began having LBP in January while shoveling snow. Pt also c/o pain radiating down RLE associated with numbness/tingling. Denies bowel/bladder habit changes. Denies saddle anesthesia. Has had multiple epidural injections with minimal relief. Pain recently worsened which brought him to the hospital. Denies any recent falls/trauma. No other complaints at this time.    CONSTITUTIONAL: No fever or chills  HEENT:  No headache, no sore throat  RESPIRATORY: No cough, wheezing, or shortness of breath  CARDIOVASCULAR: No chest pain, palpitations, or leg swelling  GASTROINTESTINAL: No nausea, vomiting, or diarrhea  GENITOURINARY: No dysuria, frequency, or hematuria  NEUROLOGICAL: no focal weakness or dizziness  SKIN:  No rashes or lesions   MUSCULOSKELETAL: no myalgias   PSYCHIATRIC: No depression or anxiety    PAST MEDICAL & SURGICAL HISTORY:  Hypercholesteremia  Diabetes mellitus: ~ diagnosed during week of 08/13 - 19/2017  Abscess  H/O arthroscopy of right knee    Home Medications:  acetaminophen 325 mg oral tablet: 2 tab(s) orally every 8 hours (14 Jul 2018 23:44)  atorvastatin 20 mg oral tablet: 1 tab(s) orally once a day (05 Jul 2018 10:55)  cyclobenzaprine 10 mg oral tablet: 1 tab(s) orally 3 times a day, As needed, Muscle Spasm (14 Jul 2018 23:44)  docusate sodium 100 mg oral capsule: 1 cap(s) orally 3 times a day (14 Jul 2018 23:44)  heparin: 5000 unit(s) subcutaneous every 8 hours (15 Jul 2018 00:00)  lidocaine 5% topical film: Apply topically to affected area once a day (15 Jul 2018 00:00)  Lyrica 50 mg oral capsule: 1 cap(s) orally once a day (at bedtime) (05 Jul 2018 10:55)  nabumetone 500 mg oral tablet: 2 tab(s) orally once a day (05 Jul 2018 10:55)  oxyCODONE 15 mg oral tablet: 1 tab(s) orally every 4 hours, As needed, moderate and severe pain (14 Jul 2018 23:44)  polyethylene glycol 3350 oral powder for reconstitution: 17 gram(s) orally once a day (14 Jul 2018 23:44)  pregabalin 50 mg oral capsule: 1 cap(s) orally 3 times a day (14 Jul 2018 23:44)  senna oral tablet: 2 tab(s) orally once a day (at bedtime) (14 Jul 2018 23:44)  simvastatin 20 mg oral tablet: 1 tab(s) orally once a day (at bedtime) (14 Jul 2018 23:44)    Allergies    No Known Allergies    Intolerances    pork (Other)    Vital Signs Last 24 Hrs  T(C): 36.5 (15 Jul 2018 00:55), Max: 36.8 (14 Jul 2018 21:31)  T(F): 97.7 (15 Jul 2018 00:55), Max: 98.3 (14 Jul 2018 21:31)  HR: 97 (15 Jul 2018 00:55) (75 - 97)  BP: 112/71 (15 Jul 2018 00:55) (112/71 - 144/96)  BP(mean): --  RR: 17 (15 Jul 2018 00:55) (17 - 18)  SpO2: 97% (15 Jul 2018 00:55) (95% - 97%)    Imaging: MR L Spine: L3-5 HNP    Physical  Gen: NAD  Spine:  skin intact, no deformity  no midline TTP C/T/L/S  no palpable step offs  minimal lumbar paraspinal hypertonicity, R>L    Motor:                   C5                C6              C7               C8           T1   R            5/5                5/5            5/5             5/5          5/5  L             5/5               5/5             5/5             5/5          5/5                L2             L3             L4               L5            S1  R         4+/5        4+/5          5/5             5/5           5/5  L          5/5          5/5           5/5             5/5           5/5    Sensory:            C5         C6         C7      C8       T1        (0=absent, 1=impaired, 2=normal, NT=not testable)  R         2            2           2        2         2  L          2            2           2        2         2               L2          L3         L4      L5       S1         (0=absent, 1=impaired, 2=normal, NT=not testable)  R         2            2            2        2        2  L          2            2           2        2         2    Negative clonus  Negative babinski  Negative mcnally  No saddle anesthesia

## 2018-07-15 NOTE — H&P ADULT - ASSESSMENT
47 y/o male w/ hx dm/ htn / obesity with L3-L5 HNP with radicular symptoms  Pain control  Bedrest  labs reviewed  NPO for OR today   IVF while NPO   fsg riss  morphine for pain  venodyne boots  Hold all chemical DVT ppx  htn/ monitor  medically optimized for l/s sx

## 2018-07-15 NOTE — H&P ADULT - HISTORY OF PRESENT ILLNESS
47 y/o male with past medical history DM2, HTN, HLD, Multiple lumbar disc herniations (L3-L4/L4-L5/L5-S1) presents to the ED in [facility]  with complaint of worsening lower back pain. Patient states he was walking  2 days prior   when he felt worsening lower back pain described as "achy and sharp" originating in the right lower back region with radiation down the  thigh, posterior leg, and dorsum of the foot.  Patient rated the pain 10/10 in severity. Denies fevers, chills, incontinence, dysuria. Notes that right leg numbness worsening  mri with mult disc herniations/ spinal stenosis transferred to [place] for sx today w/ortho spine

## 2018-07-15 NOTE — CONSULT NOTE ADULT - ASSESSMENT
49 y/o male with past medical history DM2, HTN, HLD, Multiple lumbar disc herniations (L3-L4/L4-L5/L5-S1) awaiting laminectomy/discectomy    CV status appears stable  No clinical evidence of CHF, angina or arrhythmia  No ischemic changes on ECG  Pt optimized for the OR, can proceed with low CV risk
48M with L3-L5 HNP with radicular symptoms  Pain control  Bedrest  FU labs/EKG/CXR  NPO for OR today 7/15  IVF while NPO  Hold all chemical DVT ppx  Medical optimization/clearance for OR  Plan for OR today 7/15 with Dr. Genao for L3-5 lami/discectomy  Discussed with attending

## 2018-07-15 NOTE — H&P ADULT - NSHPLABSRESULTS_GEN_ALL_CORE
17.1   7.4   )-----------( 229      ( 15 Jul 2018 05:51 )             52.5       07-15    137  |  96  |  18  ----------------------------<  169<H>  4.2   |  27  |  1.18    Ca    9.9      15 Jul 2018 05:51                    PT/INR - ( 15 Jul 2018 05:51 )   PT: 12.0 sec;   INR: 1.10 ratio         PTT - ( 15 Jul 2018 05:51 )  PTT:29.0 sec    Lactate Trend            CAPILLARY BLOOD GLUCOSE      POCT Blood Glucose.: 175 mg/dL (14 Jul 2018 21:48)

## 2018-07-16 LAB
ALBUMIN SERPL ELPH-MCNC: 4 G/DL — SIGNIFICANT CHANGE UP (ref 3.3–5)
ALP SERPL-CCNC: 113 U/L — SIGNIFICANT CHANGE UP (ref 40–120)
ALT FLD-CCNC: 15 U/L — SIGNIFICANT CHANGE UP (ref 10–45)
AMYLASE P1 CFR SERPL: 58 U/L — SIGNIFICANT CHANGE UP (ref 25–125)
ANION GAP SERPL CALC-SCNC: 11 MMOL/L — SIGNIFICANT CHANGE UP (ref 5–17)
ANION GAP SERPL CALC-SCNC: 14 MMOL/L — SIGNIFICANT CHANGE UP (ref 5–17)
AST SERPL-CCNC: 13 U/L — SIGNIFICANT CHANGE UP (ref 10–40)
BILIRUB SERPL-MCNC: 0.7 MG/DL — SIGNIFICANT CHANGE UP (ref 0.2–1.2)
BUN SERPL-MCNC: 16 MG/DL — SIGNIFICANT CHANGE UP (ref 7–23)
BUN SERPL-MCNC: 20 MG/DL — SIGNIFICANT CHANGE UP (ref 7–23)
CALCIUM SERPL-MCNC: 9.3 MG/DL — SIGNIFICANT CHANGE UP (ref 8.4–10.5)
CALCIUM SERPL-MCNC: 9.8 MG/DL — SIGNIFICANT CHANGE UP (ref 8.4–10.5)
CHLORIDE SERPL-SCNC: 96 MMOL/L — SIGNIFICANT CHANGE UP (ref 96–108)
CHLORIDE SERPL-SCNC: 98 MMOL/L — SIGNIFICANT CHANGE UP (ref 96–108)
CO2 SERPL-SCNC: 27 MMOL/L — SIGNIFICANT CHANGE UP (ref 22–31)
CO2 SERPL-SCNC: 29 MMOL/L — SIGNIFICANT CHANGE UP (ref 22–31)
CREAT SERPL-MCNC: 1.18 MG/DL — SIGNIFICANT CHANGE UP (ref 0.5–1.3)
CREAT SERPL-MCNC: 1.38 MG/DL — HIGH (ref 0.5–1.3)
GLUCOSE BLDC GLUCOMTR-MCNC: 138 MG/DL — HIGH (ref 70–99)
GLUCOSE BLDC GLUCOMTR-MCNC: 142 MG/DL — HIGH (ref 70–99)
GLUCOSE BLDC GLUCOMTR-MCNC: 158 MG/DL — HIGH (ref 70–99)
GLUCOSE BLDC GLUCOMTR-MCNC: 159 MG/DL — HIGH (ref 70–99)
GLUCOSE SERPL-MCNC: 146 MG/DL — HIGH (ref 70–99)
GLUCOSE SERPL-MCNC: 172 MG/DL — HIGH (ref 70–99)
HCT VFR BLD CALC: 45.1 % — SIGNIFICANT CHANGE UP (ref 39–50)
HGB BLD-MCNC: 15.3 G/DL — SIGNIFICANT CHANGE UP (ref 13–17)
LIDOCAIN IGE QN: 24 U/L — SIGNIFICANT CHANGE UP (ref 7–60)
MCHC RBC-ENTMCNC: 31 PG — SIGNIFICANT CHANGE UP (ref 27–34)
MCHC RBC-ENTMCNC: 33.9 GM/DL — SIGNIFICANT CHANGE UP (ref 32–36)
MCV RBC AUTO: 91.3 FL — SIGNIFICANT CHANGE UP (ref 80–100)
PLATELET # BLD AUTO: 209 K/UL — SIGNIFICANT CHANGE UP (ref 150–400)
POTASSIUM SERPL-MCNC: 4.4 MMOL/L — SIGNIFICANT CHANGE UP (ref 3.5–5.3)
POTASSIUM SERPL-MCNC: 4.4 MMOL/L — SIGNIFICANT CHANGE UP (ref 3.5–5.3)
POTASSIUM SERPL-SCNC: 4.4 MMOL/L — SIGNIFICANT CHANGE UP (ref 3.5–5.3)
POTASSIUM SERPL-SCNC: 4.4 MMOL/L — SIGNIFICANT CHANGE UP (ref 3.5–5.3)
PROT SERPL-MCNC: 8 G/DL — SIGNIFICANT CHANGE UP (ref 6–8.3)
RBC # BLD: 4.94 M/UL — SIGNIFICANT CHANGE UP (ref 4.2–5.8)
RBC # FLD: 13.1 % — SIGNIFICANT CHANGE UP (ref 10.3–14.5)
SODIUM SERPL-SCNC: 137 MMOL/L — SIGNIFICANT CHANGE UP (ref 135–145)
SODIUM SERPL-SCNC: 138 MMOL/L — SIGNIFICANT CHANGE UP (ref 135–145)
WBC # BLD: 10.13 K/UL — SIGNIFICANT CHANGE UP (ref 3.8–10.5)
WBC # FLD AUTO: 10.13 K/UL — SIGNIFICANT CHANGE UP (ref 3.8–10.5)

## 2018-07-16 PROCEDURE — 99233 SBSQ HOSP IP/OBS HIGH 50: CPT

## 2018-07-16 PROCEDURE — 74018 RADEX ABDOMEN 1 VIEW: CPT | Mod: 26

## 2018-07-16 RX ORDER — OXYCODONE HYDROCHLORIDE 5 MG/1
10 TABLET ORAL EVERY 4 HOURS
Qty: 0 | Refills: 0 | Status: DISCONTINUED | OUTPATIENT
Start: 2018-07-16 | End: 2018-07-23

## 2018-07-16 RX ORDER — OXYCODONE HYDROCHLORIDE 5 MG/1
5 TABLET ORAL EVERY 4 HOURS
Qty: 0 | Refills: 0 | Status: DISCONTINUED | OUTPATIENT
Start: 2018-07-16 | End: 2018-07-16

## 2018-07-16 RX ORDER — POLYETHYLENE GLYCOL 3350 17 G/17G
17 POWDER, FOR SOLUTION ORAL ONCE
Qty: 0 | Refills: 0 | Status: COMPLETED | OUTPATIENT
Start: 2018-07-16 | End: 2018-07-16

## 2018-07-16 RX ORDER — HYDROMORPHONE HYDROCHLORIDE 2 MG/ML
1 INJECTION INTRAMUSCULAR; INTRAVENOUS; SUBCUTANEOUS
Qty: 0 | Refills: 0 | Status: DISCONTINUED | OUTPATIENT
Start: 2018-07-16 | End: 2018-07-22

## 2018-07-16 RX ORDER — DOCUSATE SODIUM 100 MG
100 CAPSULE ORAL
Qty: 0 | Refills: 0 | Status: DISCONTINUED | OUTPATIENT
Start: 2018-07-16 | End: 2018-07-23

## 2018-07-16 RX ADMIN — Medication 1 TABLET(S): at 13:30

## 2018-07-16 RX ADMIN — Medication 100 MILLIGRAM(S): at 05:16

## 2018-07-16 RX ADMIN — Medication 100 MILLIGRAM(S): at 22:18

## 2018-07-16 RX ADMIN — OXYCODONE HYDROCHLORIDE 10 MILLIGRAM(S): 5 TABLET ORAL at 09:48

## 2018-07-16 RX ADMIN — POLYETHYLENE GLYCOL 3350 17 GRAM(S): 17 POWDER, FOR SOLUTION ORAL at 22:16

## 2018-07-16 RX ADMIN — SIMVASTATIN 20 MILLIGRAM(S): 20 TABLET, FILM COATED ORAL at 22:17

## 2018-07-16 RX ADMIN — HYDROMORPHONE HYDROCHLORIDE 1 MILLIGRAM(S): 2 INJECTION INTRAMUSCULAR; INTRAVENOUS; SUBCUTANEOUS at 05:27

## 2018-07-16 RX ADMIN — Medication 100 MILLIGRAM(S): at 13:24

## 2018-07-16 RX ADMIN — Medication 50 MILLIGRAM(S): at 05:16

## 2018-07-16 RX ADMIN — OXYCODONE HYDROCHLORIDE 10 MILLIGRAM(S): 5 TABLET ORAL at 09:05

## 2018-07-16 RX ADMIN — Medication 30 MILLILITER(S): at 22:16

## 2018-07-16 RX ADMIN — OXYCODONE HYDROCHLORIDE 10 MILLIGRAM(S): 5 TABLET ORAL at 17:54

## 2018-07-16 RX ADMIN — Medication 50 MILLIGRAM(S): at 13:30

## 2018-07-16 RX ADMIN — OXYCODONE HYDROCHLORIDE 10 MILLIGRAM(S): 5 TABLET ORAL at 22:23

## 2018-07-16 RX ADMIN — OXYCODONE HYDROCHLORIDE 10 MILLIGRAM(S): 5 TABLET ORAL at 18:42

## 2018-07-16 RX ADMIN — Medication 1 TABLET(S): at 05:16

## 2018-07-16 RX ADMIN — Medication 2: at 13:30

## 2018-07-16 RX ADMIN — OXYCODONE HYDROCHLORIDE 10 MILLIGRAM(S): 5 TABLET ORAL at 23:00

## 2018-07-16 RX ADMIN — Medication 1 TABLET(S): at 22:17

## 2018-07-16 RX ADMIN — Medication 100 MILLIGRAM(S): at 22:16

## 2018-07-16 RX ADMIN — Medication 50 MILLIGRAM(S): at 22:17

## 2018-07-16 RX ADMIN — HYDROMORPHONE HYDROCHLORIDE 1 MILLIGRAM(S): 2 INJECTION INTRAMUSCULAR; INTRAVENOUS; SUBCUTANEOUS at 05:12

## 2018-07-16 NOTE — PROGRESS NOTE ADULT - SUBJECTIVE AND OBJECTIVE BOX
He is awake, alert and moving all extremities.    He reports a moderate amount of incision pain and some soreness in the right leg, but no real radicular pain.    HCT 52.5    Drains  100/0    Plan:  Keep drains in place today.  Mobilization with PT.  Rehab eval.  ? significance of increased HCT.  Will review with Dr. Genao.    He will need discharge planning for home vs. rehab facility depending on how he does over next couple of days.

## 2018-07-16 NOTE — PROGRESS NOTE ADULT - SUBJECTIVE AND OBJECTIVE BOX
CHIEF COMPLAINT:Patient is a 48y old  Male who presents with a chief complaint of back pain (09 Jul 2018 15:07)  s/p surgery yesterday    PAST MEDICAL & SURGICAL HISTORY:  Hypercholesteremia  Diabetes mellitus: ~ diagnosed during week of 08/13 - 19/2017  Abscess  H/O arthroscopy of right knee          REVIEW OF SYSTEMS:  CONSTITUTIONAL: No fever, weight loss, or fatigue  EYES: No eye pain, visual disturbances, or discharge  NECK: No pain or stiffness  RESPIRATORY: No cough, wheezing, chills or hemoptysis; No Shortness of Breath  CARDIOVASCULAR: No chest pain, palpitations, passing out, dizziness, or leg swelling  GASTROINTESTINAL: No abdominal or epigastric pain. No nausea, vomiting, or hematemesis; No diarrhea or constipation. No melena or hematochezia.  GENITOURINARY: No dysuria, frequency, hematuria, or incontinence  NEUROLOGICAL: No headaches, memory loss, loss of strength, numbness, or tremors  c/o back pain       Medications:  MEDICATIONS  (STANDING):  calcium carbonate 1250 mG  + Vitamin D (OsCal 500 + D) 1 Tablet(s) Oral three times a day  ceFAZolin   IVPB 2000 milliGRAM(s) IV Intermittent every 8 hours  dextrose 5%. 1000 milliLiter(s) (50 mL/Hr) IV Continuous <Continuous>  dextrose 50% Injectable 12.5 Gram(s) IV Push once  dextrose 50% Injectable 25 Gram(s) IV Push once  dextrose 50% Injectable 25 Gram(s) IV Push once  insulin lispro (HumaLOG) corrective regimen sliding scale   SubCutaneous three times a day before meals  insulin lispro (HumaLOG) corrective regimen sliding scale   SubCutaneous at bedtime  lactated ringers. 1000 milliLiter(s) (75 mL/Hr) IV Continuous <Continuous>  pregabalin 50 milliGRAM(s) Oral at bedtime  pregabalin 50 milliGRAM(s) Oral three times a day  simvastatin 20 milliGRAM(s) Oral at bedtime    MEDICATIONS  (PRN):  acetaminophen   Tablet 650 milliGRAM(s) Oral every 6 hours PRN For Temp greater than 38 C (100.4 F)  acetaminophen   Tablet. 650 milliGRAM(s) Oral every 6 hours PRN headache  cyclobenzaprine 10 milliGRAM(s) Oral three times a day PRN Muscle Spasm  dextrose 40% Gel 15 Gram(s) Oral once PRN Blood Glucose LESS THAN 70 milliGRAM(s)/deciliter  diazepam    Tablet 5 milliGRAM(s) Oral every 8 hours PRN muscle spasm  diphenhydrAMINE   Injectable 12.5 milliGRAM(s) IV Push every 4 hours PRN Itching  famotidine    Tablet 20 milliGRAM(s) Oral every 12 hours PRN Dyspepsia  glucagon  Injectable 1 milliGRAM(s) IntraMuscular once PRN Glucose LESS THAN 70 milligrams/deciliter  HYDROmorphone  Injectable 1 milliGRAM(s) IV Push every 3 hours PRN Severe Pain (7 - 10)  magnesium hydroxide Suspension 30 milliLiter(s) Oral every 12 hours PRN Constipation  oxyCODONE    IR 10 milliGRAM(s) Oral every 4 hours PRN Moderate Pain (4 - 6)  oxyCODONE    IR 5 milliGRAM(s) Oral every 4 hours PRN Mild Pain (1 - 3)    	    PHYSICAL EXAM:  T(C): 36.9 (07-16-18 @ 14:29), Max: 37 (07-16-18 @ 10:05)  HR: 87 (07-16-18 @ 14:29) (75 - 97)  BP: 122/80 (07-16-18 @ 14:29) (106/67 - 155/77)  RR: 18 (07-16-18 @ 14:29) (14 - 18)  SpO2: 97% (07-16-18 @ 14:29) (88% - 98%)  Wt(kg): --  I&O's Summary    15 Jul 2018 07:01  -  16 Jul 2018 07:00  --------------------------------------------------------  IN: 1670 mL / OUT: 2050 mL / NET: -380 mL    16 Jul 2018 07:01  -  16 Jul 2018 15:25  --------------------------------------------------------  IN: 375 mL / OUT: 750 mL / NET: -375 mL        Appearance: Normal	  HEENT:   Normal oral mucosa, PERRL, EOMI	  Lymphatic: No lymphadenopathy  Cardiovascular: Normal S1 S2, No JVD, No murmurs, No edema  Respiratory: Lungs clear to auscultation	  Psychiatry: A & O x 3, Mood & affect appropriate  Gastrointestinal:  Soft, Non-tender, + BS	  Skin: No rashes, No ecchymoses, No cyanosis	  Neurologic: Non-focal from sensory intact dtr equal  Extremities: Normal range of motion, No clubbing, cyanosis or edema  Vascular: Peripheral pulses palpable 2+ bilaterally    LABS:	 	    CARDIAC MARKERS:                                15.3   10.13 )-----------( 209      ( 16 Jul 2018 09:30 )             45.1     07-16    138  |  98  |  20  ----------------------------<  146<H>  4.4   |  29  |  1.38<H>    Ca    9.3      16 Jul 2018 07:10      proBNP:   Lipid Profile:   HgA1c:   TSH:

## 2018-07-16 NOTE — PROGRESS NOTE ADULT - SUBJECTIVE AND OBJECTIVE BOX
The patient is seen lying in bed.  NAD.    On exam, the midline lumbar dressings are clean, dry and intact.  No surrounding erythema, no collections or hematomas.    A/P: The patient is doing stable.  1) Cont care per primary team  2) Cont local wound care with silver impregnated dressings  3) Cont to monitor drain outputs  4) Will cont to follow

## 2018-07-16 NOTE — PHYSICAL THERAPY INITIAL EVALUATION ADULT - PERTINENT HX OF CURRENT PROBLEM, REHAB EVAL
48M PMHx DM2, HTN, HLD, Multiple lumbar disc herniations (L3-L4/L4-L5/L5-S1) presents to [facility] with complaint of worsening LBP. Pt states he was walking  2 days prior when he felt worsening 10/10 lower back pain described as "achy and sharp" originating in the right lower back region with radiation down the  thigh, posterior leg, and dorsum of the foot.  Denies fevers, chills, incontinence, dysuria. Notes that right leg numbness worsening MRI with mult disc herniations/spinal stenosis..

## 2018-07-16 NOTE — PROGRESS NOTE ADULT - SUBJECTIVE AND OBJECTIVE BOX
Patient seen and examined. Pain controlled.    Physical exam  VS: Afebrile, vital signs stable  Gen: NAD  Spine/extremities:  Dressing clean, dry, and intact. SILT C5-T1, L3-S1. Negative Babinski. Negative ankle clonus. Capillary refill brisk. Compartments soft and nontender.  Right LE: L2- 5/5  L3- 5/5  L4- 5/5  L5- 5/5  S1- 5/5  Left LE: L2- 5/5  L3- 5/5  L4- 5/5  L5- 5/5  S1- 5/5      48M s/p L3-5 laminectomy/discectomy    Weight bear as tolerated  Pain control  DVT prophylaxis  FU drain output  Physical therapy  Discharge planning

## 2018-07-16 NOTE — PHYSICAL THERAPY INITIAL EVALUATION ADULT - ADDITIONAL COMMENTS
Pt lives in a private house with 5-6 steps to enter, 2 wide handrails; 13 steps to the bedroom, 1 handrail.

## 2018-07-16 NOTE — PROGRESS NOTE ADULT - SUBJECTIVE AND OBJECTIVE BOX
Patient seen and examined. No acute events overnight. Pain well controlled. will walk with PT today. denies numbness/tingling/paresthesias/weakness. some spasms in the right gluteal region. Denies headaches. Denies fevers/chills. No other complaints at this time.    HEALTH ISSUES - PROBLEM Dx:          MEDICATIONS  (STANDING):  calcium carbonate 1250 mG  + Vitamin D (OsCal 500 + D) 1 Tablet(s) Oral three times a day  ceFAZolin   IVPB 2000 milliGRAM(s) IV Intermittent every 8 hours  dextrose 5%. 1000 milliLiter(s) IV Continuous <Continuous>  dextrose 50% Injectable 12.5 Gram(s) IV Push once  dextrose 50% Injectable 25 Gram(s) IV Push once  dextrose 50% Injectable 25 Gram(s) IV Push once  insulin lispro (HumaLOG) corrective regimen sliding scale   SubCutaneous three times a day before meals  insulin lispro (HumaLOG) corrective regimen sliding scale   SubCutaneous at bedtime  lactated ringers. 1000 milliLiter(s) IV Continuous <Continuous>  pregabalin 50 milliGRAM(s) Oral at bedtime  pregabalin 50 milliGRAM(s) Oral three times a day  simvastatin 20 milliGRAM(s) Oral at bedtime      Allergies    No Known Allergies    Intolerances    pork (Other)      PAST MEDICAL & SURGICAL HISTORY:  Hypercholesteremia  Diabetes mellitus: ~ diagnosed during week of  -   Abscess  H/O arthroscopy of right knee                            17.1   7.4   )-----------( 229      ( 15 Jul 2018 05:51 )             52.5       15 Jul 2018 05:51    137    |  96     |  18     ----------------------------<  169    4.2     |  27     |  1.18     Ca    9.9        15 Jul 2018 05:51        PT/INR - ( 15 Jul 2018 05:51 )   PT: 12.0 sec;   INR: 1.10 ratio         PTT - ( 15 Jul 2018 05:51 )  PTT:29.0 sec    Urinalysis Basic - ( 15 Jul 2018 11:36 )    Color: Yellow / Appearance: Clear / S.018 / pH: x  Gluc: x / Ketone: Negative  / Bili: Negative / Urobili: Negative   Blood: x / Protein: Trace / Nitrite: Negative   Leuk Esterase: Negative / RBC: x / WBC x   Sq Epi: x / Non Sq Epi: x / Bacteria: x        Vital Signs Last 24 Hrs  T(C): 36.7 (18 @ 05:00), Max: 36.7 (07-15-18 @ 23:01)  T(F): 98 (18 @ 05:00), Max: 98.1 (07-15-18 @ 23:01)  HR: 95 (18 @ 05:00) (80 - 97)  BP: 128/87 (18 @ 05:00) (106/70 - 155/77)  BP(mean): 94 (07-15-18 @ 23:01) (84 - 106)  RR: 16 (18 @ 05:00) (14 - 16)  SpO2: 96% (18 @ 05:00) (88% - 98%)    Gen: NAD    Spine PE:  Dressing clean dry intact  HV drain serosang out put: HV 70 AME 0  Motor:                   C5                C6              C7               C8           T1   R            5/5                5/5            5/5             5/5          5/5  L             5/5               5/5             5/5             5/5          5/5                L2             L3             L4               L5            S1  R         5/5           5/5          5/5             5/5           5/5  L          5/5          5/5           5/5             5/5           5/5    Sensory:            C5         C6         C7      C8       T1        (0=absent, 1=impaired, 2=normal, NT=not testable)  R         2            2           2        2         2  L          2            2           2        2         2               L2          L3         L4      L5       S1         (0=absent, 1=impaired, 2=normal, NT=not testable)  R         2            2            2        2        2  L          2            2           2        2         2      A/P: 48y Male L3-L5 laminectomy and discectomy   Pain control  HV drain per prs  WBAT/PT/OT/OOB  Brace for comfort  FU Labs  SCDs  Medical co-management appreciated  dispo planning

## 2018-07-16 NOTE — PHYSICAL THERAPY INITIAL EVALUATION ADULT - GENERAL OBSERVATIONS, REHAB EVAL
Received in bed, +dressing in lumbar area; +AME drain, +HMV, +IV, +O2 via NC, +venodynes; initially without complaints and agreeable to PT

## 2018-07-16 NOTE — CHART NOTE - NSCHARTNOTEFT_GEN_A_CORE
CC: abdominal pain       HPI:  Called by RN that patient complaining of abdominal pain.  Patient seen and assessed at bedside, NAD, A&O x3. Family at bedside.  Patient states abdominal pain started at approximately 14:00-14:30 today, he was laying in bed at the time.  Pain is dull and intermittent in nature, exacerbated by palpation, no alleviating factors. He rates the pain 5-6/10, no radiation.   He endorsed eructation/passing gas. Last bowel movement was on 7/14/18.  Patient also endorsed a history of "reflux."  Patient states he normally has a bowel movement every day.   Patient denied previous episodes of similar abdominal pain.   He denied any history of abdominal surgery or any history of Crohn's disease/UC.   Patient denied headache, dizziness, CP, acute dyspnea, abdominal  pain, N/V/D, numbness/tingling, extremity weakness, dysuria.         ROS:  CONSTITUTIONAL:  No fever, chills, rigors  CARDIOVASCULAR:  No chest pain or palpitations  RESPIRATORY:   No SOB, cough, dyspnea on exertion.  No wheezing  GASTROINTESTINAL:  No abd pain, N/V, diarrhea/constipation  EXTREMITIES:  No swelling or joint pain  GENITOURINARY:  No burning on urination, increased frequency or urgency.  No flank pain  NEUROLOGIC:  No HA, visual disturbances  SKIN: No rashes        PAST MEDICAL & SURGICAL HISTORY:  Urinalysis Basic - ( 07-15 @ 11:36 )    Color: -- / Appearance: Negative / SG: -- / pH: Negative  Gluc: Negative / Ketone: Yellow  / Bili: -- / Urobili: --   Blood: -- / Protein: -- / Nitrite: Negative   Leuk Esterase: Negative / RBC: 1.018 / WBC --   Sq Epi: Trace / Non Sq Epi: -- / Bacteria: 6.5          Vital Signs Last 24 Hrs  T(C): 37.2 (16 Jul 2018 16:21), Max: 37.2 (16 Jul 2018 16:21)  T(F): 99 (16 Jul 2018 16:21), Max: 99 (16 Jul 2018 16:21)  HR: 103 (16 Jul 2018 20:16) (75 - 103)  BP: 136/87 (16 Jul 2018 20:16) (106/67 - 149/72)  BP(mean): 94 (15 Jul 2018 23:01) (86 - 99)  RR: 18 (16 Jul 2018 20:16) (16 - 18)  SpO2: 95% (16 Jul 2018 20:16) (94% - 98%)      Physical Exam:  General: WN/WD NAD, AOx3, nontoxic appearing  Head:  NC/AT  CV: RRR, S1S2   Respiratory: CTA B/L, nonlabored  Abdominal: (+) bowel sounds x4. Soft, NT, ND, no palpable mass, no guarding, or rebound tenderness  Genitourinary: ? Rolle   MSK: No BLLE edema, + peripheral pulses, FROM all 4 extremity  Skin: (+) warm, dry   Psych: Appropriate affect       Labs:                        15.3   10.13 )-----------( 209      ( 16 Jul 2018 09:30 )             45.1     07-16    138  |  98  |  20  ----------------------------<  146<H>  4.4   |  29  |  1.38<H>    Ca    9.3      16 Jul 2018 07:10        Urinalysis Basic - ( 07-15 @ 11:36 )    Color: -- / Appearance: Negative / SG: -- / pH: Negative  Gluc: Negative / Ketone: Yellow  / Bili: -- / Urobili: --   Blood: -- / Protein: -- / Nitrite: Negative   Leuk Esterase: Negative / RBC: 1.018 / WBC --   Sq Epi: Trace / Non Sq Epi: -- / Bacteria: 6.5            Radiology:          Assessment & Plan:  HPI:  49 y/o male with past medical history DM2, HTN, HLD, Multiple lumbar disc herniations (L3-L4/L4-L5/L5-S1) presents to the ED in [facility]  with complaint of worsening lower back pain. Patient states he was walking  2 days prior   when he felt worsening lower back pain described as "achy and sharp" originating in the right lower back region with radiation down the  thigh, posterior leg, and dorsum of the foot.  Patient rated the pain 10/10 in severity. Denies fevers, chills, incontinence, dysuria. Notes that right leg numbness worsening  mri with mult disc herniations/ spinal stenosis transferred to [facility] for sx today w/ortho spine (15 Jul 2018 06:27)      Abdominal pain A/P:  -STAT AXR to evaluate for free air  -STAT CMP, amylase, lipase   -Maalox x1  -Colace BID  -Miralax x1  -Primary Team to follow up in AM, attending to follow       Melissa Jaramillo PA-C  Dept of Medicine [phone]  [facility]    CC: abdominal pain       HPI:  Called by RN that patient complaining of abdominal pain.  Patient seen and assessed at bedside, NAD, A&O x3. Family at bedside.  Patient states abdominal pain started at approximately 16:00-16:30 today, he was laying in bed at the time.  Pain is dull and intermittent in nature, exacerbated by palpation, no alleviating factors. He rates the pain 5-6/10, no radiation.   He endorsed eructation/flatus. Last bowel movement was on 7/14/18.  Patient also endorsed a history of "reflux."  Patient states he normally has a bowel movement every day.   Patient denied previous episodes of similar abdominal pain.   He denied any history of abdominal surgery or any history of Crohn's disease/ UC.   He endorsed he has been eating less than usual due to a decreased appetite.   Patient denied headache, dizziness, CP, acute dyspnea, abdominal  pain, N/V/D, numbness/tingling, extremity weakness, dysuria.         ROS:  CONSTITUTIONAL:  No fever, chills, rigors  CARDIOVASCULAR:  No chest pain or palpitations  RESPIRATORY:   No SOB, cough, dyspnea on exertion.  No wheezing  GASTROINTESTINAL:  + abd pain. No N/V/D/constipation  EXTREMITIES:  No swelling or joint pain  GENITOURINARY:  No burning on urination, increased frequency or urgency.  No flank pain  NEUROLOGIC:  No HA, visual disturbances  SKIN: No rashes        PAST MEDICAL & SURGICAL HISTORY:  Hypercholesteremia  Diabetes mellitus: ~ diagnosed during week of 08/13 - 19/2017  Abscess  H/O arthroscopy of right knee        Vital Signs Last 24 Hrs  T(C): 37.2 (16 Jul 2018 16:21), Max: 37.2 (16 Jul 2018 16:21)  T(F): 99 (16 Jul 2018 16:21), Max: 99 (16 Jul 2018 16:21)  HR: 103 (16 Jul 2018 20:16) (75 - 103)  BP: 136/87 (16 Jul 2018 20:16) (106/67 - 149/72)  BP(mean): 94 (15 Jul 2018 23:01) (86 - 99)  RR: 18 (16 Jul 2018 20:16) (16 - 18)  SpO2: 95% (16 Jul 2018 20:16) (94% - 98%)      Vital Signs Last 24 Hrs  T(C): 37.3 (16 Jul 2018 21:30), Max: 37.3 (16 Jul 2018 21:30)  T(F): 99.1 (16 Jul 2018 21:30), Max: 99.1 (16 Jul 2018 21:30)  HR: 104 (16 Jul 2018 21:30) (75 - 104)  BP: 136/76 (16 Jul 2018 21:30) (106/67 - 142/87)  BP(mean): 94 (15 Jul 2018 23:01) (94 - 94)  RR: 18 (16 Jul 2018 21:30) (16 - 18)  SpO2: 94% (16 Jul 2018 21:30) (94% - 97%)      Physical Exam:  General: Morbidly obese male, laying in bed, NAD, AOx3, nontoxic appearing  Head:  NC/AT, anicteric sclerae   CV: RRR, S1S2   Respiratory: anterior lung fields CTAB, nonlabored  Abdominal: (+) tenderness to deep palpation of LUQ and LLQ. (+) bowel sounds x4. Large, Soft, no palpable mass, no guarding, or rebound tenderness. Negative Rovsing. Negative Conley.   MSK: No BLLE edema, + peripheral pulses, FROM all 4 extremity  Skin: (+) warm, dry         Labs:                        15.3   10.13 )-----------( 209      ( 16 Jul 2018 09:30 )             45.1     07-16    138  |  98  |  20  ----------------------------<  146<H>  4.4   |  29  |  1.38<H>    Ca    9.3      16 Jul 2018 07:10        Urinalysis Basic - ( 07-15 @ 11:36 )  Color: -- / Appearance: Negative / SG: -- / pH: Negative  Gluc: Negative / Ketone: Yellow  / Bili: -- / Urobili: --   Blood: -- / Protein: -- / Nitrite: Negative   Leuk Esterase: Negative / RBC: 1.018 / WBC --   Sq Epi: Trace / Non Sq Epi: -- / Bacteria: 6.5          Assessment & Plan:  HPI:  49 y/o male with past medical history DM2, HTN, HLD, Multiple lumbar disc herniations (L3-L4/L4-L5/L5-S1) presents to the ED in [facility]  with complaint of worsening lower back pain. Patient states he was walking  2 days prior when he felt worsening lower back pain described as "achy and sharp" originating in the right lower back region with radiation down the  thigh, posterior leg, and dorsum of the foot.      Patient now complaining of abdominal pain since approximately 16:00. Patient is actively passing gas. Physical exam essentially benign with the exception of tenderness to deep palpation of LUQ and LLQ. Active bowel sounds present in all 4 quadrants. Patient is afebrile, vital signs stable, no evidence of leukocytosis on labs. It is difficult to discern whether patient's abdomen is distended on exam secondary to body habitus/large protuberant abdomen.       Abdominal pain A/P:  -Likely secondary to gas formation given radiating nature of pain. Physical exam not suggestive of peritonitis. Less likely acute cholecystitis given lack of abdominal pain and negative Conley sign. Less likely obstruction since patient is passing gas without issue and no nausea/vomiting. Less likely appendicitis given location on pain, no evidence of leukocytosis on AM labs. Less likely cystitis given nature of symptoms, lack of dysuria/frequency/urgency; UA from 7/15 unremarkable.  r/o acute pancreatitis   -Vital signs hemodynamically stable, patient is afebrile  -No evidence of leukocytosis on AM labs from 7/16/18  -STAT AXR to evaluate for free air  -STAT CMP, amylase, lipase   -Maalox x1  -Start Colace BID  -Miralax x1  -Will pursue further imaging pending lab results /AXR/ worsening of symptoms   -Consider GI consult if symptoms do not improve or worsen   -Will continue to closely monitor patient   -Primary Team to follow up in AM, attending to follow       Melissa Jaramillo PA-C  Dept of Medicine  [phone]  [facility]    CC: abdominal pain       HPI:  Called by RN that patient complaining of abdominal pain.  Patient seen and assessed at bedside, NAD, A&O x3. Family at bedside.  Patient states abdominal pain started at approximately 16:00-16:30 today, he was laying in bed at the time.  Pain is dull and intermittent in nature, exacerbated by palpation, no alleviating factors. He rates the pain 5-6/10, no radiation.   He endorsed eructation/flatus. Last bowel movement was on 7/14/18.  Patient also endorsed a history of "reflux."  Patient states he normally has a bowel movement every day.   Patient denied previous episodes of similar abdominal pain.   He denied any history of abdominal surgery or any history of Crohn's disease/ UC.   He endorsed he has been eating less than usual due to a decreased appetite.   Patient denied headache, dizziness, CP, acute dyspnea, N/V/D, numbness/tingling, extremity weakness, dysuria.         ROS:  CONSTITUTIONAL:  No fever, chills, rigors  CARDIOVASCULAR:  No chest pain or palpitations  RESPIRATORY:   No SOB, cough, dyspnea on exertion.  No wheezing  GASTROINTESTINAL:  + abd pain. No N/V/D/constipation  EXTREMITIES:  No swelling or joint pain  GENITOURINARY:  No burning on urination, increased frequency or urgency.  No flank pain  NEUROLOGIC:  No HA, visual disturbances  SKIN: No rashes        PAST MEDICAL & SURGICAL HISTORY:  Hypercholesteremia  Diabetes mellitus: ~ diagnosed during week of 08/13 - 19/2017  Abscess  H/O arthroscopy of right knee        Vital Signs Last 24 Hrs  T(C): 37.2 (16 Jul 2018 16:21), Max: 37.2 (16 Jul 2018 16:21)  T(F): 99 (16 Jul 2018 16:21), Max: 99 (16 Jul 2018 16:21)  HR: 103 (16 Jul 2018 20:16) (75 - 103)  BP: 136/87 (16 Jul 2018 20:16) (106/67 - 149/72)  BP(mean): 94 (15 Jul 2018 23:01) (86 - 99)  RR: 18 (16 Jul 2018 20:16) (16 - 18)  SpO2: 95% (16 Jul 2018 20:16) (94% - 98%)      Vital Signs Last 24 Hrs  T(C): 37.3 (16 Jul 2018 21:30), Max: 37.3 (16 Jul 2018 21:30)  T(F): 99.1 (16 Jul 2018 21:30), Max: 99.1 (16 Jul 2018 21:30)  HR: 104 (16 Jul 2018 21:30) (75 - 104)  BP: 136/76 (16 Jul 2018 21:30) (106/67 - 142/87)  BP(mean): 94 (15 Jul 2018 23:01) (94 - 94)  RR: 18 (16 Jul 2018 21:30) (16 - 18)  SpO2: 94% (16 Jul 2018 21:30) (94% - 97%)      Physical Exam:  General: Morbidly obese male, laying in bed, NAD, AOx3, nontoxic appearing  Head:  NC/AT, anicteric sclerae   CV: RRR, S1S2   Respiratory: anterior lung fields CTAB, nonlabored  Abdominal: (+) tenderness to deep palpation of LUQ and LLQ. (+) bowel sounds x4. Large, Soft, no palpable mass, no guarding, or rebound tenderness. Negative Rovsing. Negative Conley.   MSK: No BLLE edema, + peripheral pulses, FROM all 4 extremity  Skin: (+) warm, dry         Labs:                        15.3   10.13 )-----------( 209      ( 16 Jul 2018 09:30 )             45.1     07-16    138  |  98  |  20  ----------------------------<  146<H>  4.4   |  29  |  1.38<H>    Ca    9.3      16 Jul 2018 07:10        Urinalysis Basic - ( 07-15 @ 11:36 )  Color: -- / Appearance: Negative / SG: -- / pH: Negative  Gluc: Negative / Ketone: Yellow  / Bili: -- / Urobili: --   Blood: -- / Protein: -- / Nitrite: Negative   Leuk Esterase: Negative / RBC: 1.018 / WBC --   Sq Epi: Trace / Non Sq Epi: -- / Bacteria: 6.5          Assessment & Plan:  HPI:  49 y/o male with past medical history DM2, HTN, HLD, Multiple lumbar disc herniations (L3-L4/L4-L5/L5-S1) presents to the ED in [facility]  with complaint of worsening lower back pain. Patient states he was walking  2 days prior when he felt worsening lower back pain described as "achy and sharp" originating in the right lower back region with radiation down the  thigh, posterior leg, and dorsum of the foot.      Patient now complaining of abdominal pain since approximately 16:00. Patient is actively passing gas. Physical exam essentially benign with the exception of tenderness to deep palpation of LUQ and LLQ. Active bowel sounds present in all 4 quadrants. Patient is afebrile, vital signs stable, no evidence of leukocytosis on labs. It is difficult to discern whether patient's abdomen is distended on exam secondary to body habitus/large protuberant abdomen.       Abdominal pain A/P:  -Likely secondary to gas formation given radiating nature of pain. Physical exam not suggestive of peritonitis. Less likely acute cholecystitis given lack of abdominal pain and negative Conley sign. Less likely obstruction since patient is passing gas without issue and no nausea/vomiting. Less likely appendicitis given location on pain, no evidence of leukocytosis on AM labs. Less likely cystitis given nature of symptoms, lack of dysuria/frequency/urgency; UA from 7/15 unremarkable.  r/o acute pancreatitis   -Vital signs hemodynamically stable, patient is afebrile  -No evidence of leukocytosis on AM labs from 7/16/18  -STAT AXR to evaluate for free air  -STAT CMP, amylase, lipase   -Maalox x1  -Start Colace BID  -Miralax x1  -Will pursue further imaging pending lab results /AXR/ worsening of symptoms   -Consider GI consult if symptoms do not improve or worsen   -Will continue to closely monitor patient   -Primary Team to follow up in AM, attending to follow       Melissa Jaramillo PA-C  Dept of Medicine  [phone]

## 2018-07-16 NOTE — PROGRESS NOTE ADULT - SUBJECTIVE AND OBJECTIVE BOX
CARDIOLOGY FOLLOW UP - Dr. Sandoval    CC no cp/sob  c/o back pain       PHYSICAL EXAM:  T(C): 37 (07-16-18 @ 10:05), Max: 37 (07-16-18 @ 10:05)  HR: 92 (07-16-18 @ 10:05) (80 - 97)  BP: 114/72 (07-16-18 @ 10:05) (106/70 - 155/77)  RR: 18 (07-16-18 @ 10:05) (14 - 18)  SpO2: 96% (07-16-18 @ 10:05) (88% - 98%)  Wt(kg): --  I&O's Summary    15 Jul 2018 07:01  -  16 Jul 2018 07:00  --------------------------------------------------------  IN: 1670 mL / OUT: 2050 mL / NET: -380 mL    16 Jul 2018 07:01  -  16 Jul 2018 11:39  --------------------------------------------------------  IN: 200 mL / OUT: 400 mL / NET: -200 mL        Appearance: Normal	  Cardiovascular: Normal S1 S2,RRR, No JVD, No murmurs  Respiratory: Lungs clear to auscultation	  Gastrointestinal:  Soft, Non-tender, + BS	  Extremities: Normal range of motion, No clubbing, cyanosis or edema        MEDICATIONS  (STANDING):  calcium carbonate 1250 mG  + Vitamin D (OsCal 500 + D) 1 Tablet(s) Oral three times a day  ceFAZolin   IVPB 2000 milliGRAM(s) IV Intermittent every 8 hours  dextrose 5%. 1000 milliLiter(s) (50 mL/Hr) IV Continuous <Continuous>  dextrose 50% Injectable 12.5 Gram(s) IV Push once  dextrose 50% Injectable 25 Gram(s) IV Push once  dextrose 50% Injectable 25 Gram(s) IV Push once  insulin lispro (HumaLOG) corrective regimen sliding scale   SubCutaneous three times a day before meals  insulin lispro (HumaLOG) corrective regimen sliding scale   SubCutaneous at bedtime  lactated ringers. 1000 milliLiter(s) (75 mL/Hr) IV Continuous <Continuous>  pregabalin 50 milliGRAM(s) Oral at bedtime  pregabalin 50 milliGRAM(s) Oral three times a day  simvastatin 20 milliGRAM(s) Oral at bedtime      TELEMETRY: 	    ECG:  	  RADIOLOGY:   DIAGNOSTIC TESTING:  [ ] Echocardiogram:  [ ]  Catheterization:  [ ] Stress Test:    OTHER: 	    LABS:	 	                                15.3   10.13 )-----------( 209      ( 16 Jul 2018 09:30 )             45.1     07-16    138  |  98  |  20  ----------------------------<  146<H>  4.4   |  29  |  1.38<H>    Ca    9.3      16 Jul 2018 07:10      PT/INR - ( 15 Jul 2018 05:51 )   PT: 12.0 sec;   INR: 1.10 ratio         PTT - ( 15 Jul 2018 05:51 )  PTT:29.0 sec

## 2018-07-16 NOTE — PHYSICAL THERAPY INITIAL EVALUATION ADULT - PLANNED THERAPY INTERVENTIONS, PT EVAL
transfer training/balance training/gait training/stairs: GOAL: Pt will negotiate 9 steps of stairs using appropriate assistive device and _ handrail via reciprocal/step-to technique indep in 2 weeks./bed mobility training

## 2018-07-16 NOTE — PROGRESS NOTE ADULT - SUBJECTIVE AND OBJECTIVE BOX
Pt seen and examined  AVSS NAD     Lumbar dressing flat; CDI.  No palpable collections.    Nik stripped  drains ss    A/P: Continue to monitor drain outputs  antibiotics while drains in place  will follow

## 2018-07-17 LAB
ANION GAP SERPL CALC-SCNC: 15 MMOL/L — SIGNIFICANT CHANGE UP (ref 5–17)
BUN SERPL-MCNC: 14 MG/DL — SIGNIFICANT CHANGE UP (ref 7–23)
CALCIUM SERPL-MCNC: 9.9 MG/DL — SIGNIFICANT CHANGE UP (ref 8.4–10.5)
CHLORIDE SERPL-SCNC: 94 MMOL/L — LOW (ref 96–108)
CO2 SERPL-SCNC: 28 MMOL/L — SIGNIFICANT CHANGE UP (ref 22–31)
CREAT SERPL-MCNC: 1.21 MG/DL — SIGNIFICANT CHANGE UP (ref 0.5–1.3)
GLUCOSE BLDC GLUCOMTR-MCNC: 133 MG/DL — HIGH (ref 70–99)
GLUCOSE BLDC GLUCOMTR-MCNC: 139 MG/DL — HIGH (ref 70–99)
GLUCOSE BLDC GLUCOMTR-MCNC: 166 MG/DL — HIGH (ref 70–99)
GLUCOSE BLDC GLUCOMTR-MCNC: 177 MG/DL — HIGH (ref 70–99)
GLUCOSE SERPL-MCNC: 160 MG/DL — HIGH (ref 70–99)
HCT VFR BLD CALC: 45.5 % — SIGNIFICANT CHANGE UP (ref 39–50)
HGB BLD-MCNC: 15.2 G/DL — SIGNIFICANT CHANGE UP (ref 13–17)
MCHC RBC-ENTMCNC: 30.9 PG — SIGNIFICANT CHANGE UP (ref 27–34)
MCHC RBC-ENTMCNC: 33.4 GM/DL — SIGNIFICANT CHANGE UP (ref 32–36)
MCV RBC AUTO: 92.5 FL — SIGNIFICANT CHANGE UP (ref 80–100)
PLATELET # BLD AUTO: 215 K/UL — SIGNIFICANT CHANGE UP (ref 150–400)
POTASSIUM SERPL-MCNC: 4 MMOL/L — SIGNIFICANT CHANGE UP (ref 3.5–5.3)
POTASSIUM SERPL-SCNC: 4 MMOL/L — SIGNIFICANT CHANGE UP (ref 3.5–5.3)
RBC # BLD: 4.92 M/UL — SIGNIFICANT CHANGE UP (ref 4.2–5.8)
RBC # FLD: 12.8 % — SIGNIFICANT CHANGE UP (ref 10.3–14.5)
SODIUM SERPL-SCNC: 137 MMOL/L — SIGNIFICANT CHANGE UP (ref 135–145)
WBC # BLD: 10.92 K/UL — HIGH (ref 3.8–10.5)
WBC # FLD AUTO: 10.92 K/UL — HIGH (ref 3.8–10.5)

## 2018-07-17 RX ORDER — POLYETHYLENE GLYCOL 3350 17 G/17G
17 POWDER, FOR SOLUTION ORAL DAILY
Qty: 0 | Refills: 0 | Status: DISCONTINUED | OUTPATIENT
Start: 2018-07-17 | End: 2018-07-23

## 2018-07-17 RX ORDER — SENNA PLUS 8.6 MG/1
2 TABLET ORAL AT BEDTIME
Qty: 0 | Refills: 0 | Status: DISCONTINUED | OUTPATIENT
Start: 2018-07-17 | End: 2018-07-23

## 2018-07-17 RX ADMIN — Medication 100 MILLIGRAM(S): at 05:49

## 2018-07-17 RX ADMIN — OXYCODONE HYDROCHLORIDE 10 MILLIGRAM(S): 5 TABLET ORAL at 18:16

## 2018-07-17 RX ADMIN — Medication 100 MILLIGRAM(S): at 18:17

## 2018-07-17 RX ADMIN — Medication 100 MILLIGRAM(S): at 21:59

## 2018-07-17 RX ADMIN — OXYCODONE HYDROCHLORIDE 10 MILLIGRAM(S): 5 TABLET ORAL at 05:49

## 2018-07-17 RX ADMIN — OXYCODONE HYDROCHLORIDE 10 MILLIGRAM(S): 5 TABLET ORAL at 11:18

## 2018-07-17 RX ADMIN — Medication 2: at 18:17

## 2018-07-17 RX ADMIN — POLYETHYLENE GLYCOL 3350 17 GRAM(S): 17 POWDER, FOR SOLUTION ORAL at 18:16

## 2018-07-17 RX ADMIN — OXYCODONE HYDROCHLORIDE 10 MILLIGRAM(S): 5 TABLET ORAL at 12:00

## 2018-07-17 RX ADMIN — Medication 50 MILLIGRAM(S): at 05:49

## 2018-07-17 RX ADMIN — SIMVASTATIN 20 MILLIGRAM(S): 20 TABLET, FILM COATED ORAL at 21:59

## 2018-07-17 RX ADMIN — Medication 1 TABLET(S): at 14:06

## 2018-07-17 RX ADMIN — Medication 100 MILLIGRAM(S): at 14:05

## 2018-07-17 RX ADMIN — Medication 1 TABLET(S): at 21:59

## 2018-07-17 RX ADMIN — Medication 1 TABLET(S): at 05:49

## 2018-07-17 RX ADMIN — OXYCODONE HYDROCHLORIDE 10 MILLIGRAM(S): 5 TABLET ORAL at 06:28

## 2018-07-17 RX ADMIN — OXYCODONE HYDROCHLORIDE 10 MILLIGRAM(S): 5 TABLET ORAL at 19:00

## 2018-07-17 RX ADMIN — SENNA PLUS 2 TABLET(S): 8.6 TABLET ORAL at 21:58

## 2018-07-17 RX ADMIN — Medication 50 MILLIGRAM(S): at 21:58

## 2018-07-17 RX ADMIN — Medication 50 MILLIGRAM(S): at 14:05

## 2018-07-17 NOTE — PROGRESS NOTE ADULT - SUBJECTIVE AND OBJECTIVE BOX
Patient is a 48y old  Male who presents with a chief complaint of     POST OPERATIVE DAY #:  2  Patient states tingling lower extremities are gone however still feels a little weak       T(C): 36.9 (07-17-18 @ 06:07), Max: 37.3 (07-16-18 @ 21:30)  HR: 94 (07-17-18 @ 06:07) (75 - 104)  BP: 114/70 (07-17-18 @ 06:07) (106/67 - 136/87)  RR: 18 (07-17-18 @ 06:07) (18 - 18)  SpO2: 96% (07-17-18 @ 06:07) (94% - 97%)  Wt(kg): --    PHYSICAL EXAM:  NAD, Alert  Back: Dressing C/D/I; sensation grossly intact to light touch; (+) Distal Pulses; No Calf tenderness B/L, PAS          AME  20/20          HMV 10/50               Rubio Lower extremeity                  PF          DF         EHL       FHL                                                                                            R        5/5        5/5        5/5       5/5                                                        L         5/5        5/5        5/5       5/5      LABS:                        15.3   10.13 )-----------( 209      ( 16 Jul 2018 09:30 )             45.1     07-16    137  |  96  |  16  ----------------------------<  172<H>  4.4   |  27  |  1.18    Ca    9.8      16 Jul 2018 22:00    TPro  8.0  /  Alb  4.0  /  TBili  0.7  /  DBili  x   /  AST  13  /  ALT  15  /  AlkPhos  113  07-16

## 2018-07-17 NOTE — PROGRESS NOTE ADULT - SUBJECTIVE AND OBJECTIVE BOX
CARDIOLOGY FOLLOW UP - Dr. Sandoval    CC no cp/sob   c/o pain in lower extremities - ortho/neurosurg f/u       PHYSICAL EXAM:  T(C): 36.8 (07-17-18 @ 08:00), Max: 37.3 (07-16-18 @ 21:30)  HR: 90 (07-17-18 @ 08:00) (87 - 104)  BP: 112/69 (07-17-18 @ 08:00) (112/69 - 136/87)  RR: 18 (07-17-18 @ 08:00) (18 - 18)  SpO2: 95% (07-17-18 @ 08:00) (94% - 97%)  Wt(kg): --  I&O's Summary    16 Jul 2018 07:01  -  17 Jul 2018 07:00  --------------------------------------------------------  IN: 1225 mL / OUT: 3080 mL / NET: -1855 mL        Appearance: Normal	  Cardiovascular: Normal S1 S2,RRR, No JVD, No murmurs  Respiratory: Lungs clear to auscultation	  Gastrointestinal:  Soft, Non-tender, + BS	  Extremities: Normal range of motion, No clubbing, cyanosis or edema        MEDICATIONS  (STANDING):  calcium carbonate 1250 mG  + Vitamin D (OsCal 500 + D) 1 Tablet(s) Oral three times a day  ceFAZolin   IVPB 2000 milliGRAM(s) IV Intermittent every 8 hours  dextrose 5%. 1000 milliLiter(s) (50 mL/Hr) IV Continuous <Continuous>  dextrose 50% Injectable 12.5 Gram(s) IV Push once  dextrose 50% Injectable 25 Gram(s) IV Push once  dextrose 50% Injectable 25 Gram(s) IV Push once  docusate sodium 100 milliGRAM(s) Oral two times a day  insulin lispro (HumaLOG) corrective regimen sliding scale   SubCutaneous three times a day before meals  insulin lispro (HumaLOG) corrective regimen sliding scale   SubCutaneous at bedtime  pregabalin 50 milliGRAM(s) Oral at bedtime  pregabalin 50 milliGRAM(s) Oral three times a day  simvastatin 20 milliGRAM(s) Oral at bedtime      TELEMETRY: 	    ECG:  	  RADIOLOGY:   DIAGNOSTIC TESTING:  [ ] Echocardiogram:  [ ]  Catheterization:  [ ] Stress Test:    OTHER: 	    LABS:	 	                                15.2   10.92 )-----------( 215      ( 17 Jul 2018 08:02 )             45.5     07-17    137  |  94<L>  |  14  ----------------------------<  160<H>  4.0   |  28  |  1.21    Ca    9.9      17 Jul 2018 07:13    TPro  8.0  /  Alb  4.0  /  TBili  0.7  /  DBili  x   /  AST  13  /  ALT  15  /  AlkPhos  113  07-16

## 2018-07-17 NOTE — PROGRESS NOTE ADULT - SUBJECTIVE AND OBJECTIVE BOX
PAST MEDICAL & SURGICAL HISTORY:  Hypercholesteremia  Diabetes mellitus: ~ diagnosed during week of 08/13 - 19/2017  Abscess  H/O arthroscopy of right knee          REVIEW OF SYSTEMS:  CONSTITUTIONAL: No fever, weight loss, or fatigue  EYES: No eye pain, visual disturbances, or discharge  NECK: No pain or stiffness  RESPIRATORY: No cough, wheezing, chills or hemoptysis; No Shortness of Breath  CARDIOVASCULAR: No chest pain, palpitations, passing out, dizziness, or leg swelling  GASTROINTESTINAL: No abdominal or epigastric pain. No nausea, vomiting, or hematemesis; No diarrhea or constipation. No melena or hematochezia.  GENITOURINARY: No dysuria, frequency, hematuria, or incontinence  NEUROLOGICAL: No headaches,c/o dec strenfth /pain rlext  SKIN: No itching, burning, rashes, or lesions   LYMPH Nodes: No enlarged glands  ENDOCRINE: No heat or cold intolerance; No hair loss  MUSCULOSKELETAL: c/o r lext pain     Medications:  MEDICATIONS  (STANDING):  calcium carbonate 1250 mG  + Vitamin D (OsCal 500 + D) 1 Tablet(s) Oral three times a day  ceFAZolin   IVPB 2000 milliGRAM(s) IV Intermittent every 8 hours  dextrose 5%. 1000 milliLiter(s) (50 mL/Hr) IV Continuous <Continuous>  dextrose 50% Injectable 12.5 Gram(s) IV Push once  dextrose 50% Injectable 25 Gram(s) IV Push once  dextrose 50% Injectable 25 Gram(s) IV Push once  docusate sodium 100 milliGRAM(s) Oral two times a day  insulin lispro (HumaLOG) corrective regimen sliding scale   SubCutaneous three times a day before meals  insulin lispro (HumaLOG) corrective regimen sliding scale   SubCutaneous at bedtime  pregabalin 50 milliGRAM(s) Oral at bedtime  pregabalin 50 milliGRAM(s) Oral three times a day  simvastatin 20 milliGRAM(s) Oral at bedtime    MEDICATIONS  (PRN):  acetaminophen   Tablet 650 milliGRAM(s) Oral every 6 hours PRN For Temp greater than 38 C (100.4 F)  acetaminophen   Tablet. 650 milliGRAM(s) Oral every 6 hours PRN headache  cyclobenzaprine 10 milliGRAM(s) Oral three times a day PRN Muscle Spasm  dextrose 40% Gel 15 Gram(s) Oral once PRN Blood Glucose LESS THAN 70 milliGRAM(s)/deciliter  diazepam    Tablet 5 milliGRAM(s) Oral every 8 hours PRN muscle spasm  diphenhydrAMINE   Injectable 12.5 milliGRAM(s) IV Push every 4 hours PRN Itching  famotidine    Tablet 20 milliGRAM(s) Oral every 12 hours PRN Dyspepsia  glucagon  Injectable 1 milliGRAM(s) IntraMuscular once PRN Glucose LESS THAN 70 milligrams/deciliter  HYDROmorphone  Injectable 1 milliGRAM(s) IV Push every 3 hours PRN Severe Pain (7 - 10)  magnesium hydroxide Suspension 30 milliLiter(s) Oral every 12 hours PRN Constipation  oxyCODONE    IR 10 milliGRAM(s) Oral every 4 hours PRN Moderate Pain (4 - 6)  oxyCODONE    IR 5 milliGRAM(s) Oral every 4 hours PRN Mild Pain (1 - 3)    	    PHYSICAL EXAM:  T(C): 36.9 (07-17-18 @ 06:07), Max: 37.3 (07-16-18 @ 21:30)  HR: 94 (07-17-18 @ 06:07) (75 - 104)  BP: 114/70 (07-17-18 @ 06:07) (106/67 - 136/87)  RR: 18 (07-17-18 @ 06:07) (18 - 18)  SpO2: 96% (07-17-18 @ 06:07) (94% - 97%)  Wt(kg): --  I&O's Summary    15 Jul 2018 07:01  -  16 Jul 2018 07:00  --------------------------------------------------------  IN: 1670 mL / OUT: 2050 mL / NET: -380 mL    16 Jul 2018 07:01  -  17 Jul 2018 06:17  --------------------------------------------------------  IN: 1125 mL / OUT: 3080 mL / NET: -1955 mL        Appearance: Normal	  HEENT:   Normal oral mucosa, PERRL, EOMI	  Lymphatic: No lymphadenopathy  Cardiovascular: Normal S1 S2, No JVD, No murmurs, No edema  Respiratory: Lungs clear to auscultation	  Psychiatry: A & O x 3, Mood & affect appropriate  Gastrointestinal:  Soft, Non-tender, + BS	  Skin: No rashes, No ecchymoses, No cyanosis	  Neurologic:  dec rom rlext dec motor 3/4/- /5    sensory intact   dtr  /equal  cn intact    Extremities: Normal range of motion, No clubbing, cyanosis or edema  Vascular: Peripheral pulses palpable 2+ bilaterally    TELEMETRY: 	    ECG:  	  RADIOLOGY:  OTHER: 	  	  LABS:	 	    CARDIAC MARKERS:                                15.3   10.13 )-----------( 209      ( 16 Jul 2018 09:30 )             45.1     07-16    137  |  96  |  16  ----------------------------<  172<H>  4.4   |  27  |  1.18    Ca    9.8      16 Jul 2018 22:00    TPro  8.0  /  Alb  4.0  /  TBili  0.7  /  DBili  x   /  AST  13  /  ALT  15  /  AlkPhos  113  07-16    proBNP:   Lipid Profile:   HgA1c:   TSH:

## 2018-07-17 NOTE — PROGRESS NOTE ADULT - SUBJECTIVE AND OBJECTIVE BOX
He notices some pain in the right gluteal and hip region, but no radicular pain down the leg.  Moves all 4s well.      Drains:  AME  20/20          HMV 10/50     Impression:    1.  Continue with mobilization, and evaluation for placement.  2.  Drains as per plastics.   Likely d/c over next day or so.  3.  ? evaluation of right hip if pain continues He notices some pain in the right gluteal and hip region, but no radicular pain down the leg.  Moves all 4s well.      Drains:  AME  O          HMV 10/50     Impression:    1.  Continue with mobilization, and evaluation for placement.  2.  Drains as per plastics.   Likely d/c AME today with HVAC over next day or so.  3.  ? evaluation of right hip if pain continues

## 2018-07-18 DIAGNOSIS — Z98.890 OTHER SPECIFIED POSTPROCEDURAL STATES: ICD-10-CM

## 2018-07-18 LAB
ANION GAP SERPL CALC-SCNC: 17 MMOL/L — SIGNIFICANT CHANGE UP (ref 5–17)
BUN SERPL-MCNC: 15 MG/DL — SIGNIFICANT CHANGE UP (ref 7–23)
CALCIUM SERPL-MCNC: 9.8 MG/DL — SIGNIFICANT CHANGE UP (ref 8.4–10.5)
CHLORIDE SERPL-SCNC: 93 MMOL/L — LOW (ref 96–108)
CO2 SERPL-SCNC: 26 MMOL/L — SIGNIFICANT CHANGE UP (ref 22–31)
CREAT SERPL-MCNC: 1.1 MG/DL — SIGNIFICANT CHANGE UP (ref 0.5–1.3)
GLUCOSE BLDC GLUCOMTR-MCNC: 140 MG/DL — HIGH (ref 70–99)
GLUCOSE BLDC GLUCOMTR-MCNC: 144 MG/DL — HIGH (ref 70–99)
GLUCOSE BLDC GLUCOMTR-MCNC: 166 MG/DL — HIGH (ref 70–99)
GLUCOSE BLDC GLUCOMTR-MCNC: 174 MG/DL — HIGH (ref 70–99)
GLUCOSE BLDC GLUCOMTR-MCNC: 285 MG/DL — HIGH (ref 70–99)
GLUCOSE SERPL-MCNC: 162 MG/DL — HIGH (ref 70–99)
HCT VFR BLD CALC: 46.2 % — SIGNIFICANT CHANGE UP (ref 39–50)
HGB BLD-MCNC: 15 G/DL — SIGNIFICANT CHANGE UP (ref 13–17)
MCHC RBC-ENTMCNC: 29.8 PG — SIGNIFICANT CHANGE UP (ref 27–34)
MCHC RBC-ENTMCNC: 32.5 GM/DL — SIGNIFICANT CHANGE UP (ref 32–36)
MCV RBC AUTO: 91.8 FL — SIGNIFICANT CHANGE UP (ref 80–100)
PLATELET # BLD AUTO: 227 K/UL — SIGNIFICANT CHANGE UP (ref 150–400)
POTASSIUM SERPL-MCNC: 4.2 MMOL/L — SIGNIFICANT CHANGE UP (ref 3.5–5.3)
POTASSIUM SERPL-SCNC: 4.2 MMOL/L — SIGNIFICANT CHANGE UP (ref 3.5–5.3)
RBC # BLD: 5.03 M/UL — SIGNIFICANT CHANGE UP (ref 4.2–5.8)
RBC # FLD: 12.7 % — SIGNIFICANT CHANGE UP (ref 10.3–14.5)
SODIUM SERPL-SCNC: 136 MMOL/L — SIGNIFICANT CHANGE UP (ref 135–145)
SURGICAL PATHOLOGY STUDY: SIGNIFICANT CHANGE UP
TROPONIN T, HIGH SENSITIVITY RESULT: 14 NG/L — SIGNIFICANT CHANGE UP (ref 0–51)
TROPONIN T, HIGH SENSITIVITY RESULT: 17 NG/L — SIGNIFICANT CHANGE UP (ref 0–51)
WBC # BLD: 11.37 K/UL — HIGH (ref 3.8–10.5)
WBC # FLD AUTO: 11.37 K/UL — HIGH (ref 3.8–10.5)

## 2018-07-18 RX ORDER — SIMETHICONE 80 MG/1
80 TABLET, CHEWABLE ORAL EVERY 6 HOURS
Qty: 0 | Refills: 0 | Status: DISCONTINUED | OUTPATIENT
Start: 2018-07-18 | End: 2018-07-23

## 2018-07-18 RX ORDER — HEPARIN SODIUM 5000 [USP'U]/ML
5000 INJECTION INTRAVENOUS; SUBCUTANEOUS EVERY 12 HOURS
Qty: 0 | Refills: 0 | Status: DISCONTINUED | OUTPATIENT
Start: 2018-07-18 | End: 2018-07-23

## 2018-07-18 RX ADMIN — OXYCODONE HYDROCHLORIDE 10 MILLIGRAM(S): 5 TABLET ORAL at 05:45

## 2018-07-18 RX ADMIN — SIMVASTATIN 20 MILLIGRAM(S): 20 TABLET, FILM COATED ORAL at 22:01

## 2018-07-18 RX ADMIN — POLYETHYLENE GLYCOL 3350 17 GRAM(S): 17 POWDER, FOR SOLUTION ORAL at 13:59

## 2018-07-18 RX ADMIN — Medication 100 MILLIGRAM(S): at 05:11

## 2018-07-18 RX ADMIN — Medication 100 MILLIGRAM(S): at 13:59

## 2018-07-18 RX ADMIN — Medication 50 MILLIGRAM(S): at 22:01

## 2018-07-18 RX ADMIN — Medication 2: at 14:34

## 2018-07-18 RX ADMIN — SENNA PLUS 2 TABLET(S): 8.6 TABLET ORAL at 22:01

## 2018-07-18 RX ADMIN — Medication 5 MILLIGRAM(S): at 22:01

## 2018-07-18 RX ADMIN — Medication 50 MILLIGRAM(S): at 13:58

## 2018-07-18 RX ADMIN — SIMETHICONE 80 MILLIGRAM(S): 80 TABLET, CHEWABLE ORAL at 14:34

## 2018-07-18 RX ADMIN — OXYCODONE HYDROCHLORIDE 10 MILLIGRAM(S): 5 TABLET ORAL at 13:58

## 2018-07-18 RX ADMIN — Medication 100 MILLIGRAM(S): at 22:02

## 2018-07-18 RX ADMIN — Medication 1 TABLET(S): at 05:11

## 2018-07-18 RX ADMIN — HEPARIN SODIUM 5000 UNIT(S): 5000 INJECTION INTRAVENOUS; SUBCUTANEOUS at 17:36

## 2018-07-18 RX ADMIN — Medication 100 MILLIGRAM(S): at 17:34

## 2018-07-18 RX ADMIN — Medication 100 MILLIGRAM(S): at 05:12

## 2018-07-18 RX ADMIN — MAGNESIUM HYDROXIDE 30 MILLILITER(S): 400 TABLET, CHEWABLE ORAL at 20:31

## 2018-07-18 RX ADMIN — OXYCODONE HYDROCHLORIDE 10 MILLIGRAM(S): 5 TABLET ORAL at 15:00

## 2018-07-18 RX ADMIN — Medication 50 MILLIGRAM(S): at 05:11

## 2018-07-18 RX ADMIN — Medication 6: at 17:34

## 2018-07-18 RX ADMIN — Medication 1 TABLET(S): at 13:59

## 2018-07-18 RX ADMIN — OXYCODONE HYDROCHLORIDE 10 MILLIGRAM(S): 5 TABLET ORAL at 05:15

## 2018-07-18 RX ADMIN — Medication 1 TABLET(S): at 22:01

## 2018-07-18 NOTE — PROGRESS NOTE ADULT - SUBJECTIVE AND OBJECTIVE BOX
PAST MEDICAL & SURGICAL HISTORY:  Hypercholesteremia  Diabetes mellitus: ~ diagnosed during week of 08/13 - 19/2017  Abscess  H/O arthroscopy of right knee          REVIEW OF SYSTEMS:  CONSTITUTIONAL: No fever, weight loss, or fatigue  EYES: No eye pain, visual disturbances, or discharge  NECK: No pain or stiffness  RESPIRATORY: No cough, wheezing, chills or hemoptysis; No Shortness of Breath  CARDIOVASCULAR: No chest pain, palpitations, passing out, dizziness, or leg swelling  GASTROINTESTINAL: No abdominal or epigastric pain. No nausea, vomiting, or hematemesis; No diarrhea or constipation. No melena or hematochezia.  GENITOURINARY: No dysuria, frequency, hematuria, or incontinence  NEUROLOGICAL: No headaches, memory loss, loss of strength, numbness, or tremors  c/o r hip pain into   r leg    Medications:  MEDICATIONS  (STANDING):  calcium carbonate 1250 mG  + Vitamin D (OsCal 500 + D) 1 Tablet(s) Oral three times a day  ceFAZolin   IVPB 2000 milliGRAM(s) IV Intermittent every 8 hours  dextrose 5%. 1000 milliLiter(s) (50 mL/Hr) IV Continuous <Continuous>  dextrose 50% Injectable 12.5 Gram(s) IV Push once  dextrose 50% Injectable 25 Gram(s) IV Push once  dextrose 50% Injectable 25 Gram(s) IV Push once  docusate sodium 100 milliGRAM(s) Oral two times a day  heparin  Injectable 5000 Unit(s) SubCutaneous every 12 hours  insulin lispro (HumaLOG) corrective regimen sliding scale   SubCutaneous three times a day before meals  insulin lispro (HumaLOG) corrective regimen sliding scale   SubCutaneous at bedtime  polyethylene glycol 3350 17 Gram(s) Oral daily  pregabalin 50 milliGRAM(s) Oral at bedtime  pregabalin 50 milliGRAM(s) Oral three times a day  senna 2 Tablet(s) Oral at bedtime  simvastatin 20 milliGRAM(s) Oral at bedtime    MEDICATIONS  (PRN):  acetaminophen   Tablet 650 milliGRAM(s) Oral every 6 hours PRN For Temp greater than 38 C (100.4 F)  acetaminophen   Tablet. 650 milliGRAM(s) Oral every 6 hours PRN headache  cyclobenzaprine 10 milliGRAM(s) Oral three times a day PRN Muscle Spasm  dextrose 40% Gel 15 Gram(s) Oral once PRN Blood Glucose LESS THAN 70 milliGRAM(s)/deciliter  diazepam    Tablet 5 milliGRAM(s) Oral every 8 hours PRN muscle spasm  diphenhydrAMINE   Injectable 12.5 milliGRAM(s) IV Push every 4 hours PRN Itching  famotidine    Tablet 20 milliGRAM(s) Oral every 12 hours PRN Dyspepsia  glucagon  Injectable 1 milliGRAM(s) IntraMuscular once PRN Glucose LESS THAN 70 milligrams/deciliter  HYDROmorphone  Injectable 1 milliGRAM(s) IV Push every 3 hours PRN Severe Pain (7 - 10)  magnesium hydroxide Suspension 30 milliLiter(s) Oral every 12 hours PRN Constipation  oxyCODONE    IR 10 milliGRAM(s) Oral every 4 hours PRN Moderate Pain (4 - 6)  oxyCODONE    IR 5 milliGRAM(s) Oral every 4 hours PRN Mild Pain (1 - 3)    	    PHYSICAL EXAM:  T(C): 37.2 (07-18-18 @ 04:16), Max: 37.2 (07-18-18 @ 04:16)  HR: 94 (07-18-18 @ 04:16) (94 - 108)  BP: 104/68 (07-18-18 @ 04:16) (104/68 - 125/80)  RR: 18 (07-18-18 @ 04:16) (18 - 18)  SpO2: 98% (07-18-18 @ 04:16) (92% - 100%)  Wt(kg): --  I&O's Summary    17 Jul 2018 07:01  -  18 Jul 2018 07:00  --------------------------------------------------------  IN: 1420 mL / OUT: 2050 mL / NET: -630 mL        Appearance: Normal	  HEENT:   Normal oral mucosa, PERRL, EOMI	  Lymphatic: No lymphadenopathy  Cardiovascular: Normal S1 S2, No JVD, No murmurs, No edema  Respiratory: Lungs clear to auscultation	  Psychiatry: A & O x 3, Mood & affect appropriate  Gastrointestinal:  Soft, Non-tender, + BS	  Skin: No rashes, No ecchymoses, No cyanosis	  Neurologic: Non-focal  motor dec rlext 4/5  sensory intact dtr equal   Extremities: Normal range of motion, No clubbing, cyanosis or edema  Vascular: Peripheral pulses palpable 2+ bilaterally    TELEMETRY: 	    ECG:  	  RADIOLOGY:  OTHER: 	  	  LABS:	 	    CARDIAC MARKERS:                                15.2   10.92 )-----------( 215      ( 17 Jul 2018 08:02 )             45.5     07-18    136  |  93<L>  |  15  ----------------------------<  162<H>  4.2   |  26  |  1.10    Ca    9.8      18 Jul 2018 06:50    TPro  8.0  /  Alb  4.0  /  TBili  0.7  /  DBili  x   /  AST  13  /  ALT  15  /  AlkPhos  113  07-16    proBNP:   Lipid Profile:   HgA1c:   TSH:

## 2018-07-18 NOTE — PROGRESS NOTE ADULT - SUBJECTIVE AND OBJECTIVE BOX
Pt seen and examined  NAD     Lumbar suture line is CDI with no erythema, no palpable collections/hematomas  HV 90 cc ss  AME 10 cc ss    A/P: Dressing was changed  Continue to monitor drain outputs  Continue dressing changes  antibiotics while drains in place  will follow

## 2018-07-18 NOTE — PROVIDER CONTACT NOTE (OTHER) - ASSESSMENT
Pt c/o SOB and CP, vital signs stable (/72, HR 93, O2 sats 97% RA and 18 RR). Pt diaphoretic, placed in chair. Pt states chest feels "heavy". PA notified.

## 2018-07-18 NOTE — CHART NOTE - NSCHARTNOTEFT_GEN_A_CORE
Medicine PA    SUBJECTIVE: Notified by RN - patient c/o heavy chest pain and diaphoretic while walking with PT. Patient also with gas pain. Stat EKG    T(C): 36.8 (07-18-18 @ 08:00), Max: 37.2 (07-18-18 @ 04:16)  HR: 100 (07-18-18 @ 08:00) (94 - 108)  BP: 110/87 (07-18-18 @ 08:00) (104/68 - 125/80)  RR: 18 (07-18-18 @ 08:00) (18 - 18)  SpO2: 95% (07-18-18 @ 08:00) (95% - 100%)    PHYSICAL EXAM:  GENERAL: A&Ox3, in NAD  HEAD:  Atraumatic, Normocephalic  EYES: EOMI, PERRLA, conjunctiva and sclera clear  NECK: Supple, No JVD  CHEST/LUNG: Clear to auscultation bilaterally; No wheeze/rales/rhonchi  HEART: S1, S2. Regular rate and rhythm; No murmurs, rubs, or gallops  ABDOMEN: Nondistended, Normoactive Bowel sounds x 4, Soft, Nontender  EXTREMITIES:  2+ Peripheral Pulses, No clubbing, cyanosis, or edema  NEUROLOGY: non-focal  SKIN: No rashes or lesions    LABS:                        15.0   11.37 )-----------( 227      ( 18 Jul 2018 08:16 )             46.2     07-18    136  |  93<L>  |  15  ----------------------------<  162<H>  4.2   |  26  |  1.10    Ca    9.8      18 Jul 2018 06:50    TPro  8.0  /  Alb  4.0  /  TBili  0.7  /  DBili  x   /  AST  13  /  ALT  15  /  AlkPhos  113  07-16              RADIOLOGY & ADDITIONAL TESTS:    ASSESSMENT/PLAN:   HPI:  47 y/o male with past medical history DM2, HTN, HLD, Multiple lumbar disc herniations (L3-L4/L4-L5/L5-S1) presents to the ED in [facility]  with complaint of worsening lower back pain. Patient states he was walking  2 days prior   when he felt worsening lower back pain described as "achy and sharp" originating in the right lower back region with radiation down the  thigh, posterior leg, and dorsum of the foot.  Patient rated the pain 10/10 in severity. Denies fevers, chills, incontinence, dysuria. Notes that right leg numbness worsening  mri with mult disc herniations/ spinal stenosis transferred to [place] for sx today w/ortho spine (15 Jul 2018 06:27)      1)  -EKG  -Simethicone prn gas pain    Contact #______ Medicine PA    SUBJECTIVE: Notified by RN - patient c/o heavy chest pain and diaphoretic while walking with PT. Patient also with gas pain. Stat EKG    T(C): 36.8 (07-18-18 @ 08:00), Max: 37.2 (07-18-18 @ 04:16)  HR: 100 (07-18-18 @ 08:00) (94 - 108)  BP: 110/87 (07-18-18 @ 08:00) (104/68 - 125/80)  RR: 18 (07-18-18 @ 08:00) (18 - 18)  SpO2: 95% (07-18-18 @ 08:00) (95% - 100%)    PHYSICAL EXAM:  GENERAL: A&Ox3, in NAD  HEAD:  Atraumatic, Normocephalic  EYES: EOMI, PERRLA, conjunctiva and sclera clear  NECK: Supple, No JVD  CHEST/LUNG: Clear to auscultation bilaterally; No wheeze/rales/rhonchi  HEART: S1, S2. Regular rate and rhythm; No murmurs, rubs, or gallops  ABDOMEN: Nondistended, Normoactive Bowel sounds x 4, Soft, Nontender  EXTREMITIES:  2+ Peripheral Pulses, No clubbing, cyanosis, or edema  NEUROLOGY: non-focal  SKIN: No rashes or lesions    LABS:                        15.0   11.37 )-----------( 227      ( 18 Jul 2018 08:16 )             46.2     07-18    136  |  93<L>  |  15  ----------------------------<  162<H>  4.2   |  26  |  1.10    Ca    9.8      18 Jul 2018 06:50    TPro  8.0  /  Alb  4.0  /  TBili  0.7  /  DBili  x   /  AST  13  /  ALT  15  /  AlkPhos  113  07-16              RADIOLOGY & ADDITIONAL TESTS:    ASSESSMENT/PLAN:   HPI:  49 y/o male with past medical history DM2, HTN, HLD, Multiple lumbar disc herniations (L3-L4/L4-L5/L5-S1) presents to the ED in [facility]  with complaint of worsening lower back pain. Patient states he was walking  2 days prior   when he felt worsening lower back pain described as "achy and sharp" originating in the right lower back region with radiation down the  thigh, posterior leg, and dorsum of the foot.  Patient rated the pain 10/10 in severity. Denies fevers, chills, incontinence, dysuria. Notes that right leg numbness worsening  mri with mult disc herniations/ spinal stenosis transferred to [place] for sx today w/ortho spine (15 Jul 2018 06:27)      1)  -EKG - NSR HR 95, LVH (chronic),   -Simethicone prn gas pain    Contact #______ Medicine PA    SUBJECTIVE: Notified by RN - patient c/o heavy chest pain and diaphoretic while walking with PT. Patient also with gas pain. Stat EKG    T(C): 36.8 (07-18-18 @ 08:00), Max: 37.2 (07-18-18 @ 04:16)  HR: 100 (07-18-18 @ 08:00) (94 - 108)  BP: 110/87 (07-18-18 @ 08:00) (104/68 - 125/80)  RR: 18 (07-18-18 @ 08:00) (18 - 18)  SpO2: 95% (07-18-18 @ 08:00) (95% - 100%)    PHYSICAL EXAM:  GENERAL: A&Ox3, in NAD  HEAD:  Atraumatic, Normocephalic  EYES: EOMI, PERRLA, conjunctiva and sclera clear  NECK: Supple, No JVD  CHEST/LUNG: Clear to auscultation bilaterally; No wheeze/rales/rhonchi  HEART: S1, S2. Regular rate and rhythm; No murmurs, rubs, or gallops  ABDOMEN: Nondistended, Normoactive Bowel sounds x 4, Soft, Nontender  EXTREMITIES:  2+ Peripheral Pulses, No clubbing, cyanosis, or edema  NEUROLOGY: non-focal  SKIN: No rashes or lesions    LABS:                        15.0   11.37 )-----------( 227      ( 18 Jul 2018 08:16 )             46.2     07-18    136  |  93<L>  |  15  ----------------------------<  162<H>  4.2   |  26  |  1.10    Ca    9.8      18 Jul 2018 06:50    TPro  8.0  /  Alb  4.0  /  TBili  0.7  /  DBili  x   /  AST  13  /  ALT  15  /  AlkPhos  113  07-16              RADIOLOGY & ADDITIONAL TESTS:    ASSESSMENT/PLAN:   HPI:  47 y/o male with past medical history DM2, HTN, HLD, Multiple lumbar disc herniations (L3-L4/L4-L5/L5-S1) presents to the ED in [facility]  with complaint of worsening lower back pain. Patient states he was walking  2 days prior   when he felt worsening lower back pain described as "achy and sharp" originating in the right lower back region with radiation down the  thigh, posterior leg, and dorsum of the foot.  Patient rated the pain 10/10 in severity. Denies fevers, chills, incontinence, dysuria. Notes that right leg numbness worsening  mri with mult disc herniations/ spinal stenosis transferred to [place] for sx today w/ortho spine (15 Jul 2018 06:27)      1)Brief Chest pain with exertion, diaphoresis, lightheadedness that self resolved with rest - r/o ACS, possibly due to deconditioning post surgery    -EKG - NSR HR 95, LVH (chronic), ?1mm ST depression in Lead III/AVF unclear if artifact 2/2 to chest hair, no new ST-T wave changes in all other leads compared to previous EKG done on 7/15  -EKG repeated - NSR, HR 94, LVH (chronic)<1mm ST depression in Lead III, no ST depression in Lead AVF, no new ST-T wave changes in all other leads compared to previous EKG done on 7/15  -Stat HS troponin  -Simethicone prn gas pain    Kevin Lua PA-C  Department of Medicine  #[id number] Medicine PA    SUBJECTIVE: Notified by RN - patient c/o heavy chest pressure, lightheadedness and was diaphoretic after walking 50 feet with PT. Chest pressure, diaphoresis, and lightheadedness all resolved once patient sat down and took a rest from physical therapy. VS stable. EKG in process. Patient seen and examined at bedside. Patient reports chest pressure had resolved as soon as he rested with PT. Patient reports it lasted only a couple min. Patient also with continual complaint of abdominal gas pain. Patient reports passing a lot of gas but no bowel movements yet today. Patient reports father  at 49 possibly due to MI but unsure. Otherwise patient denies any other family history of MI/cardiac issues. Patient denies chest pain, sob, dizziness/lightheadedness, palpitations, N/V, diarrhea, weakness, numbness/tingling.    Vital Signs Last 24 Hrs  T(C): 36.8 (2018 16:23), Max: 37.2 (2018 04:16)  T(F): 98.2 (2018 16:23), Max: 98.9 (2018 04:16)  HR: 100 (2018 16:23) (93 - 108)  BP: 105/75 (2018 16:23) (104/68 - 125/80)  BP(mean): --  RR: 18 (2018 16:23) (18 - 18)  SpO2: 95% (2018 16:23) (95% - 100%)    PHYSICAL EXAM:  GENERAL: A&Ox3, in NAD  NECK: Supple, No JVD  CHEST/LUNG: Clear to auscultation bilaterally; No wheeze/rales/rhonchi  HEART: S1, S2. Regular rate and rhythm; No murmurs, rubs, or gallops  BACK: Dressing C/D/I with 2 drains  ABDOMEN: Nondistended, Normoactive Bowel sounds x 4, Soft, Nontender  EXTREMITIES:  2+ Peripheral Pulses, No clubbing, cyanosis, or edema  NEUROLOGY: non-focal  SKIN: No rashes or lesions    LABS:                        15.0   11.37 )-----------( 227      ( 2018 08:16 )             46.2     07-18    136  |  93<L>  |  15  ----------------------------<  162<H>  4.2   |  26  |  1.10    Ca    9.8      2018 06:50    TPro  8.0  /  Alb  4.0  /  TBili  0.7  /  DBili  x   /  AST  13  /  ALT  15  /  AlkPhos  113  07-16      RADIOLOGY & ADDITIONAL TESTS:    ASSESSMENT/PLAN:   49 y/o male with past medical history DM2, HTN, HLD, Multiple lumbar disc herniations (L3-L4/L4-L5/L5-S1) s/p laminectomy/discectomy. Pt now with new onset chest pressure upon exertion with physical therapy.    1)Brief Chest pressure with exertion, diaphoresis, lightheadedness that self resolved with rest - r/o ACS, possibly due to deconditioning post surgery    -1st EKG - NSR HR 95, LVH (chronic), ?1mm ST depression in Lead III/AVF unclear if artifact 2/2 to chest hair, no new ST-T wave changes in all other leads compared to previous EKG done on 7/15  -EKG repeated - NSR, HR 94, LVH (chronic)<1mm ST depression in Lead III, no ST depression in Lead AVF, no new ST-T wave changes in all other leads compared to previous EKG done on 7/15  -Stat HS troponin - 17 (intermediate) - Night team to F/u 2nd HS troponin  -2 L NC for support  -Simethicone prn gas pain, continue bowel regimen  -Will monitor for further chest pain/episodes  -Continue to monitor vital signs  -Discussed case with attending Dr. Jacques - agrees with above plan  -Discussed case with cardiologist Dr. Pandya - agrees with plan - will recheck another EKG in AM    Kevin Lua PA-C  Department of Medicine  #[id number]

## 2018-07-18 NOTE — PROGRESS NOTE ADULT - PROBLEM SELECTOR PLAN 1
PT/OT-WBAT  IS  DVT PPx  Pain Control  Continue Current Tx.  Drains per PRS/NSx  Plan per primary team  Encouraged increased mobility    Kurt Ortega PA-C  Team Pager: #[phone]

## 2018-07-18 NOTE — PROGRESS NOTE ADULT - SUBJECTIVE AND OBJECTIVE BOX
CARDIOLOGY FOLLOW UP - Dr. Sandoval    CC no cp/sob       PHYSICAL EXAM:  T(C): 36.8 (07-18-18 @ 08:00), Max: 37.2 (07-18-18 @ 04:16)  HR: 100 (07-18-18 @ 08:00) (94 - 108)  BP: 110/87 (07-18-18 @ 08:00) (104/68 - 125/80)  RR: 18 (07-18-18 @ 08:00) (18 - 18)  SpO2: 95% (07-18-18 @ 08:00) (92% - 100%)  Wt(kg): --  I&O's Summary    17 Jul 2018 07:01  -  18 Jul 2018 07:00  --------------------------------------------------------  IN: 1420 mL / OUT: 2050 mL / NET: -630 mL    18 Jul 2018 07:01  -  18 Jul 2018 10:18  --------------------------------------------------------  IN: 0 mL / OUT: 250 mL / NET: -250 mL        Appearance: Normal	  Cardiovascular: Normal S1 S2,RRR, No JVD, No murmurs  Respiratory: Lungs clear to auscultation	  Gastrointestinal:  Soft, Non-tender, + BS	  Extremities: Normal range of motion, No clubbing, cyanosis or edema        MEDICATIONS  (STANDING):  calcium carbonate 1250 mG  + Vitamin D (OsCal 500 + D) 1 Tablet(s) Oral three times a day  ceFAZolin   IVPB 2000 milliGRAM(s) IV Intermittent every 8 hours  dextrose 5%. 1000 milliLiter(s) (50 mL/Hr) IV Continuous <Continuous>  dextrose 50% Injectable 12.5 Gram(s) IV Push once  dextrose 50% Injectable 25 Gram(s) IV Push once  dextrose 50% Injectable 25 Gram(s) IV Push once  docusate sodium 100 milliGRAM(s) Oral two times a day  heparin  Injectable 5000 Unit(s) SubCutaneous every 12 hours  insulin lispro (HumaLOG) corrective regimen sliding scale   SubCutaneous three times a day before meals  insulin lispro (HumaLOG) corrective regimen sliding scale   SubCutaneous at bedtime  polyethylene glycol 3350 17 Gram(s) Oral daily  pregabalin 50 milliGRAM(s) Oral at bedtime  pregabalin 50 milliGRAM(s) Oral three times a day  senna 2 Tablet(s) Oral at bedtime  simvastatin 20 milliGRAM(s) Oral at bedtime      TELEMETRY: 	    ECG:  	  RADIOLOGY:   DIAGNOSTIC TESTING:  [ ] Echocardiogram:  [ ]  Catheterization:  [ ] Stress Test:    OTHER: 	    LABS:	 	                                15.0   11.37 )-----------( 227      ( 18 Jul 2018 08:16 )             46.2     07-18    136  |  93<L>  |  15  ----------------------------<  162<H>  4.2   |  26  |  1.10    Ca    9.8      18 Jul 2018 06:50    TPro  8.0  /  Alb  4.0  /  TBili  0.7  /  DBili  x   /  AST  13  /  ALT  15  /  AlkPhos  113  07-16

## 2018-07-18 NOTE — PROVIDER CONTACT NOTE (OTHER) - ACTION/TREATMENT ORDERED:
PA bedside assessment done. FS checked prior to OOB, WDL. Pt supine in bed, states symptoms relieving. VSS. EKG done. 2LO2NC placed as per Stoney FISCHER. Pending further inteventions.

## 2018-07-18 NOTE — PROGRESS NOTE ADULT - SUBJECTIVE AND OBJECTIVE BOX
Post op Day [3 ]    Patient resting without complaints.  No chest pain, SOB, N/V.  Patient states parasthesias RLE resolved    T(C): 37.2 (07-18-18 @ 04:16), Max: 37.2 (07-18-18 @ 04:16)  HR: 94 (07-18-18 @ 04:16) (90 - 108)  BP: 104/68 (07-18-18 @ 04:16) (104/68 - 125/80)  RR: 18 (07-18-18 @ 04:16) (18 - 18)  SpO2: 98% (07-18-18 @ 04:16) (92% - 100%)  Wt(kg): --    Exam:  Alert and Oriented, No Acute Distress  Back aquacel c/d/i w/ x2 drains, (HV/AME)  NVI 5/5 hip flex/abd/add, PF/DF/EHL/FHL bilat  Calves Soft, Non-tender bilaterally  SILT globally                        15.2   10.92 )-----------( 215      ( 17 Jul 2018 08:02 )             45.5    07-17    137  |  94<L>  |  14  ----------------------------<  160<H>  4.0   |  28  |  1.21    Ca    9.9      17 Jul 2018 07:13    TPro  8.0  /  Alb  4.0  /  TBili  0.7  /  DBili  x   /  AST  13  /  ALT  15  /  AlkPhos  113  07-16

## 2018-07-19 LAB
ANION GAP SERPL CALC-SCNC: 12 MMOL/L — SIGNIFICANT CHANGE UP (ref 5–17)
BUN SERPL-MCNC: 15 MG/DL — SIGNIFICANT CHANGE UP (ref 7–23)
CALCIUM SERPL-MCNC: 10.2 MG/DL — SIGNIFICANT CHANGE UP (ref 8.4–10.5)
CHLORIDE SERPL-SCNC: 95 MMOL/L — LOW (ref 96–108)
CO2 SERPL-SCNC: 28 MMOL/L — SIGNIFICANT CHANGE UP (ref 22–31)
CREAT SERPL-MCNC: 1.08 MG/DL — SIGNIFICANT CHANGE UP (ref 0.5–1.3)
GLUCOSE BLDC GLUCOMTR-MCNC: 157 MG/DL — HIGH (ref 70–99)
GLUCOSE BLDC GLUCOMTR-MCNC: 201 MG/DL — HIGH (ref 70–99)
GLUCOSE BLDC GLUCOMTR-MCNC: 202 MG/DL — HIGH (ref 70–99)
GLUCOSE SERPL-MCNC: 179 MG/DL — HIGH (ref 70–99)
HCT VFR BLD CALC: 45.4 % — SIGNIFICANT CHANGE UP (ref 39–50)
HGB BLD-MCNC: 14.9 G/DL — SIGNIFICANT CHANGE UP (ref 13–17)
MCHC RBC-ENTMCNC: 30.3 PG — SIGNIFICANT CHANGE UP (ref 27–34)
MCHC RBC-ENTMCNC: 32.8 GM/DL — SIGNIFICANT CHANGE UP (ref 32–36)
MCV RBC AUTO: 92.5 FL — SIGNIFICANT CHANGE UP (ref 80–100)
PLATELET # BLD AUTO: 235 K/UL — SIGNIFICANT CHANGE UP (ref 150–400)
POTASSIUM SERPL-MCNC: 4 MMOL/L — SIGNIFICANT CHANGE UP (ref 3.5–5.3)
POTASSIUM SERPL-SCNC: 4 MMOL/L — SIGNIFICANT CHANGE UP (ref 3.5–5.3)
RBC # BLD: 4.91 M/UL — SIGNIFICANT CHANGE UP (ref 4.2–5.8)
RBC # FLD: 12.6 % — SIGNIFICANT CHANGE UP (ref 10.3–14.5)
SODIUM SERPL-SCNC: 135 MMOL/L — SIGNIFICANT CHANGE UP (ref 135–145)
WBC # BLD: 10.06 K/UL — SIGNIFICANT CHANGE UP (ref 3.8–10.5)
WBC # FLD AUTO: 10.06 K/UL — SIGNIFICANT CHANGE UP (ref 3.8–10.5)

## 2018-07-19 PROCEDURE — 73502 X-RAY EXAM HIP UNI 2-3 VIEWS: CPT | Mod: 26,RT

## 2018-07-19 PROCEDURE — 93970 EXTREMITY STUDY: CPT | Mod: 26

## 2018-07-19 RX ORDER — MULTIVIT WITH MIN/MFOLATE/K2 340-15/3 G
150 POWDER (GRAM) ORAL ONCE
Qty: 0 | Refills: 0 | Status: COMPLETED | OUTPATIENT
Start: 2018-07-18 | End: 2018-07-19

## 2018-07-19 RX ORDER — LACTULOSE 10 G/15ML
30 SOLUTION ORAL ONCE
Qty: 0 | Refills: 0 | Status: COMPLETED | OUTPATIENT
Start: 2018-07-19 | End: 2018-07-19

## 2018-07-19 RX ADMIN — Medication 4: at 11:08

## 2018-07-19 RX ADMIN — Medication 2: at 15:53

## 2018-07-19 RX ADMIN — Medication 50 MILLIGRAM(S): at 05:28

## 2018-07-19 RX ADMIN — LACTULOSE 30 GRAM(S): 10 SOLUTION ORAL at 10:04

## 2018-07-19 RX ADMIN — SIMVASTATIN 20 MILLIGRAM(S): 20 TABLET, FILM COATED ORAL at 21:44

## 2018-07-19 RX ADMIN — Medication 50 MILLIGRAM(S): at 21:44

## 2018-07-19 RX ADMIN — OXYCODONE HYDROCHLORIDE 10 MILLIGRAM(S): 5 TABLET ORAL at 16:26

## 2018-07-19 RX ADMIN — OXYCODONE HYDROCHLORIDE 10 MILLIGRAM(S): 5 TABLET ORAL at 05:56

## 2018-07-19 RX ADMIN — OXYCODONE HYDROCHLORIDE 10 MILLIGRAM(S): 5 TABLET ORAL at 05:26

## 2018-07-19 RX ADMIN — OXYCODONE HYDROCHLORIDE 10 MILLIGRAM(S): 5 TABLET ORAL at 15:52

## 2018-07-19 RX ADMIN — Medication 50 MILLIGRAM(S): at 15:52

## 2018-07-19 RX ADMIN — Medication 100 MILLIGRAM(S): at 05:25

## 2018-07-19 RX ADMIN — Medication 1 TABLET(S): at 15:53

## 2018-07-19 RX ADMIN — Medication 100 MILLIGRAM(S): at 21:44

## 2018-07-19 RX ADMIN — Medication 5 MILLIGRAM(S): at 21:44

## 2018-07-19 RX ADMIN — HEPARIN SODIUM 5000 UNIT(S): 5000 INJECTION INTRAVENOUS; SUBCUTANEOUS at 05:25

## 2018-07-19 RX ADMIN — HEPARIN SODIUM 5000 UNIT(S): 5000 INJECTION INTRAVENOUS; SUBCUTANEOUS at 18:04

## 2018-07-19 RX ADMIN — Medication 150 MILLILITER(S): at 05:25

## 2018-07-19 RX ADMIN — SENNA PLUS 2 TABLET(S): 8.6 TABLET ORAL at 21:44

## 2018-07-19 RX ADMIN — Medication 100 MILLIGRAM(S): at 18:04

## 2018-07-19 RX ADMIN — Medication 1 TABLET(S): at 05:25

## 2018-07-19 RX ADMIN — POLYETHYLENE GLYCOL 3350 17 GRAM(S): 17 POWDER, FOR SOLUTION ORAL at 11:07

## 2018-07-19 RX ADMIN — Medication 100 MILLIGRAM(S): at 15:52

## 2018-07-19 RX ADMIN — Medication 1 TABLET(S): at 21:44

## 2018-07-19 NOTE — PROGRESS NOTE ADULT - SUBJECTIVE AND OBJECTIVE BOX
CARDIOLOGY FOLLOW UP - Dr. Sandoval    CC   - Pt. c/o of mid sternal CP last night and this am during ambulation. Describes pain as a "pressure" that is relieved immediately with rest. Pt. states that he frequently has this pressure before when he used to play sports. States he has never had a cardiac w/u because he feels like it is "not necessary"       PHYSICAL EXAM:  T(C): 36.5 (07-19-18 @ 08:40), Max: 37.6 (07-18-18 @ 21:55)  HR: 93 (07-19-18 @ 08:40) (93 - 103)  BP: 116/81 (07-19-18 @ 08:40) (105/75 - 118/72)  RR: 18 (07-19-18 @ 08:40) (18 - 18)  SpO2: 95% (07-19-18 @ 08:40) (93% - 97%)  Wt(kg): --  I&O's Summary    18 Jul 2018 07:01  -  19 Jul 2018 07:00  --------------------------------------------------------  IN: 1730 mL / OUT: 1865 mL / NET: -135 mL        Appearance: Normal	  Cardiovascular: Normal S1 S2,RRR, No JVD, No murmurs  Respiratory: Lungs clear to auscultation	  Gastrointestinal:  Soft, Non-tender, + BS	  Extremities: Normal range of motion, No clubbing, cyanosis or edema        MEDICATIONS  (STANDING):  calcium carbonate 1250 mG  + Vitamin D (OsCal 500 + D) 1 Tablet(s) Oral three times a day  ceFAZolin   IVPB 2000 milliGRAM(s) IV Intermittent every 8 hours  dextrose 5%. 1000 milliLiter(s) (50 mL/Hr) IV Continuous <Continuous>  dextrose 50% Injectable 12.5 Gram(s) IV Push once  dextrose 50% Injectable 25 Gram(s) IV Push once  dextrose 50% Injectable 25 Gram(s) IV Push once  docusate sodium 100 milliGRAM(s) Oral two times a day  heparin  Injectable 5000 Unit(s) SubCutaneous every 12 hours  insulin lispro (HumaLOG) corrective regimen sliding scale   SubCutaneous three times a day before meals  insulin lispro (HumaLOG) corrective regimen sliding scale   SubCutaneous at bedtime  polyethylene glycol 3350 17 Gram(s) Oral daily  pregabalin 50 milliGRAM(s) Oral at bedtime  pregabalin 50 milliGRAM(s) Oral three times a day  senna 2 Tablet(s) Oral at bedtime  simvastatin 20 milliGRAM(s) Oral at bedtime      TELEMETRY: 	    ECG:   	  RADIOLOGY:   DIAGNOSTIC TESTING:  [ ] Echocardiogram:  [ ]  Catheterization:  [ ] Stress Test:    OTHER: 	    LABS:	 	                                14.9   10.06 )-----------( 235      ( 19 Jul 2018 08:10 )             45.4     07-19    135  |  95<L>  |  15  ----------------------------<  179<H>  4.0   |  28  |  1.08    Ca    10.2      19 Jul 2018 07:04

## 2018-07-19 NOTE — PROGRESS NOTE ADULT - SUBJECTIVE AND OBJECTIVE BOX
PAST MEDICAL & SURGICAL HISTORY:  Hypercholesteremia  Diabetes mellitus: ~ diagnosed during week of 08/13 - 19/2017  Abscess  H/O arthroscopy of right knee          REVIEW OF SYSTEMS:  CONSTITUTIONAL: No fever, weight loss, or fatigue  EYES: No eye pain, visual disturbances, or discharge  NECK: No pain or stiffness  RESPIRATORY: No cough, wheezing, chills or hemoptysis; No Shortness of Breath  CARDIOVASCULAR: No chest pain, palpitations, passing out, dizziness, or leg swelling  GASTROINTESTINAL: No abdominal or epigastric pain. No nausea, vomiting, or hematemesis; No diarrhea or constipation. No melena or hematochezia.  GENITOURINARY: No dysuria, frequency, hematuria, or incontinence  NEUROLOGICAL: No headaches, memory loss, loss of strength, numbness, or tremors  pain r hip better     Medications:  MEDICATIONS  (STANDING):  calcium carbonate 1250 mG  + Vitamin D (OsCal 500 + D) 1 Tablet(s) Oral three times a day  ceFAZolin   IVPB 2000 milliGRAM(s) IV Intermittent every 8 hours  dextrose 5%. 1000 milliLiter(s) (50 mL/Hr) IV Continuous <Continuous>  dextrose 50% Injectable 12.5 Gram(s) IV Push once  dextrose 50% Injectable 25 Gram(s) IV Push once  dextrose 50% Injectable 25 Gram(s) IV Push once  docusate sodium 100 milliGRAM(s) Oral two times a day  heparin  Injectable 5000 Unit(s) SubCutaneous every 12 hours  insulin lispro (HumaLOG) corrective regimen sliding scale   SubCutaneous three times a day before meals  insulin lispro (HumaLOG) corrective regimen sliding scale   SubCutaneous at bedtime  lactulose Syrup 30 Gram(s) Oral once  polyethylene glycol 3350 17 Gram(s) Oral daily  pregabalin 50 milliGRAM(s) Oral at bedtime  pregabalin 50 milliGRAM(s) Oral three times a day  senna 2 Tablet(s) Oral at bedtime  simvastatin 20 milliGRAM(s) Oral at bedtime    MEDICATIONS  (PRN):  acetaminophen   Tablet 650 milliGRAM(s) Oral every 6 hours PRN For Temp greater than 38 C (100.4 F)  acetaminophen   Tablet. 650 milliGRAM(s) Oral every 6 hours PRN headache  cyclobenzaprine 10 milliGRAM(s) Oral three times a day PRN Muscle Spasm  dextrose 40% Gel 15 Gram(s) Oral once PRN Blood Glucose LESS THAN 70 milliGRAM(s)/deciliter  diazepam    Tablet 5 milliGRAM(s) Oral every 8 hours PRN muscle spasm  diphenhydrAMINE   Injectable 12.5 milliGRAM(s) IV Push every 4 hours PRN Itching  famotidine    Tablet 20 milliGRAM(s) Oral every 12 hours PRN Dyspepsia  glucagon  Injectable 1 milliGRAM(s) IntraMuscular once PRN Glucose LESS THAN 70 milligrams/deciliter  HYDROmorphone  Injectable 1 milliGRAM(s) IV Push every 3 hours PRN Severe Pain (7 - 10)  magnesium hydroxide Suspension 30 milliLiter(s) Oral every 12 hours PRN Constipation  oxyCODONE    IR 10 milliGRAM(s) Oral every 4 hours PRN Moderate Pain (4 - 6)  oxyCODONE    IR 5 milliGRAM(s) Oral every 4 hours PRN Mild Pain (1 - 3)  simethicone 80 milliGRAM(s) Chew every 6 hours PRN Gas    	    PHYSICAL EXAM:  T(C): 36.5 (07-19-18 @ 08:40), Max: 37.6 (07-18-18 @ 21:55)  HR: 93 (07-19-18 @ 08:40) (93 - 103)  BP: 116/81 (07-19-18 @ 08:40) (105/75 - 118/72)  RR: 18 (07-19-18 @ 08:40) (18 - 18)  SpO2: 95% (07-19-18 @ 08:40) (93% - 97%)  Wt(kg): --  I&O's Summary    18 Jul 2018 07:01  -  19 Jul 2018 07:00  --------------------------------------------------------  IN: 1730 mL / OUT: 1865 mL / NET: -135 mL        Appearance: Normal	  HEENT:   Normal oral mucosa, PERRL, EOMI	  Lymphatic: No lymphadenopathy  Cardiovascular: Normal S1 S2, No JVD, No murmurs, No edema  Respiratory: Lungs clear to auscultation	  Psychiatry: A & O x 3, Mood & affect appropriate  Gastrointestinal:  Soft, Non-tender, + BS	  Skin: No rashes, No ecchymoses, No cyanosis	  Neurologic: Non-focal cn intact  sensory intact / dtr equal   Extremities: dec rom r lext, No clubbing, cyanosis or edema  Vascular: Peripheral pulses palpable 2+ bilaterally    TELEMETRY: 	    ECG:  	  RADIOLOGY:  OTHER: 	  	  LABS:	 	    CARDIAC MARKERS:                                14.9   10.06 )-----------( 235      ( 19 Jul 2018 08:10 )             45.4     07-19    135  |  95<L>  |  15  ----------------------------<  179<H>  4.0   |  28  |  1.08    Ca    10.2      19 Jul 2018 07:04      proBNP:   Lipid Profile:   HgA1c:   TSH:

## 2018-07-19 NOTE — PROGRESS NOTE ADULT - SUBJECTIVE AND OBJECTIVE BOX
Patient reports some abdominal pain yesterday.  Feels a bit better now.  Reports right leg still feels a bit week.    Neuro exam shows he is able to move all 4s.    HVAC 45 over last 24 hours  AME 20 over last 24 hours    Plan:  Mobilization.  Monitor drain output.  Needs rehab evaluation and placement

## 2018-07-19 NOTE — PROGRESS NOTE ADULT - SUBJECTIVE AND OBJECTIVE BOX
Pt seen and examined  AVSS NAD    Lumbar incision CDI with nylons.  NO erythema, no drainage, no open areas  elliott stripped  drains ss    A/P: Continue to monitor drain outputs  antibiotics while drains in place  will follow

## 2018-07-19 NOTE — PROGRESS NOTE ADULT - SUBJECTIVE AND OBJECTIVE BOX
Patient is a 48y old  Male who presents with a chief complaint of     POST OPERATIVE DAY #:  4  Patient comfortable       T(C): 36.9 (07-19-18 @ 05:25), Max: 37.6 (07-18-18 @ 21:55)  HR: 99 (07-19-18 @ 05:25) (93 - 103)  BP: 118/72 (07-19-18 @ 05:25) (105/75 - 118/72)  RR: 18 (07-19-18 @ 05:25) (18 - 18)  SpO2: 94% (07-19-18 @ 05:25) (93% - 97%)  Wt(kg): --    PHYSICAL EXAM:  NAD, Alert  Back: Dressing C/D/I; sensation grossly intact to light touch; (+) Distal Pulses; No Calf tenderness B/L, PAS           Drains intact                   Rubio  Lower extremeity                  PF          DF         EHL       FHL                                                                                            R        5/5        5/5        5/5       5/5                                                        L         5/5        5/5        5/5       5/5      LABS:                        15.0   11.37 )-----------( 227      ( 18 Jul 2018 08:16 )             46.2     07-18    136  |  93<L>  |  15  ----------------------------<  162<H>  4.2   |  26  |  1.10    Ca    9.8      18 Jul 2018 06:50

## 2018-07-20 LAB
ANION GAP SERPL CALC-SCNC: 17 MMOL/L — SIGNIFICANT CHANGE UP (ref 5–17)
BUN SERPL-MCNC: 17 MG/DL — SIGNIFICANT CHANGE UP (ref 7–23)
CALCIUM SERPL-MCNC: 9.7 MG/DL — SIGNIFICANT CHANGE UP (ref 8.4–10.5)
CHLORIDE SERPL-SCNC: 94 MMOL/L — LOW (ref 96–108)
CO2 SERPL-SCNC: 25 MMOL/L — SIGNIFICANT CHANGE UP (ref 22–31)
CREAT SERPL-MCNC: 1.05 MG/DL — SIGNIFICANT CHANGE UP (ref 0.5–1.3)
GLUCOSE BLDC GLUCOMTR-MCNC: 105 MG/DL — HIGH (ref 70–99)
GLUCOSE BLDC GLUCOMTR-MCNC: 174 MG/DL — HIGH (ref 70–99)
GLUCOSE BLDC GLUCOMTR-MCNC: 224 MG/DL — HIGH (ref 70–99)
GLUCOSE BLDC GLUCOMTR-MCNC: 226 MG/DL — HIGH (ref 70–99)
GLUCOSE SERPL-MCNC: 172 MG/DL — HIGH (ref 70–99)
HCT VFR BLD CALC: 42.2 % — SIGNIFICANT CHANGE UP (ref 39–50)
HGB BLD-MCNC: 14.2 G/DL — SIGNIFICANT CHANGE UP (ref 13–17)
MCHC RBC-ENTMCNC: 30.8 PG — SIGNIFICANT CHANGE UP (ref 27–34)
MCHC RBC-ENTMCNC: 33.6 GM/DL — SIGNIFICANT CHANGE UP (ref 32–36)
MCV RBC AUTO: 91.5 FL — SIGNIFICANT CHANGE UP (ref 80–100)
PLATELET # BLD AUTO: 257 K/UL — SIGNIFICANT CHANGE UP (ref 150–400)
POTASSIUM SERPL-MCNC: 4 MMOL/L — SIGNIFICANT CHANGE UP (ref 3.5–5.3)
POTASSIUM SERPL-SCNC: 4 MMOL/L — SIGNIFICANT CHANGE UP (ref 3.5–5.3)
RBC # BLD: 4.61 M/UL — SIGNIFICANT CHANGE UP (ref 4.2–5.8)
RBC # FLD: 12.6 % — SIGNIFICANT CHANGE UP (ref 10.3–14.5)
SODIUM SERPL-SCNC: 136 MMOL/L — SIGNIFICANT CHANGE UP (ref 135–145)
WBC # BLD: 8.54 K/UL — SIGNIFICANT CHANGE UP (ref 3.8–10.5)
WBC # FLD AUTO: 8.54 K/UL — SIGNIFICANT CHANGE UP (ref 3.8–10.5)

## 2018-07-20 RX ADMIN — Medication 100 MILLIGRAM(S): at 06:14

## 2018-07-20 RX ADMIN — Medication 5 MILLIGRAM(S): at 22:47

## 2018-07-20 RX ADMIN — Medication 4: at 14:33

## 2018-07-20 RX ADMIN — Medication 50 MILLIGRAM(S): at 21:32

## 2018-07-20 RX ADMIN — HEPARIN SODIUM 5000 UNIT(S): 5000 INJECTION INTRAVENOUS; SUBCUTANEOUS at 06:14

## 2018-07-20 RX ADMIN — Medication 100 MILLIGRAM(S): at 14:33

## 2018-07-20 RX ADMIN — Medication 1 TABLET(S): at 21:33

## 2018-07-20 RX ADMIN — Medication 50 MILLIGRAM(S): at 06:14

## 2018-07-20 RX ADMIN — OXYCODONE HYDROCHLORIDE 10 MILLIGRAM(S): 5 TABLET ORAL at 06:15

## 2018-07-20 RX ADMIN — Medication 50 MILLIGRAM(S): at 15:09

## 2018-07-20 RX ADMIN — OXYCODONE HYDROCHLORIDE 10 MILLIGRAM(S): 5 TABLET ORAL at 21:27

## 2018-07-20 RX ADMIN — SIMVASTATIN 20 MILLIGRAM(S): 20 TABLET, FILM COATED ORAL at 21:34

## 2018-07-20 RX ADMIN — OXYCODONE HYDROCHLORIDE 10 MILLIGRAM(S): 5 TABLET ORAL at 20:27

## 2018-07-20 RX ADMIN — SENNA PLUS 2 TABLET(S): 8.6 TABLET ORAL at 21:33

## 2018-07-20 RX ADMIN — HEPARIN SODIUM 5000 UNIT(S): 5000 INJECTION INTRAVENOUS; SUBCUTANEOUS at 18:02

## 2018-07-20 RX ADMIN — Medication 1 TABLET(S): at 15:10

## 2018-07-20 RX ADMIN — Medication 100 MILLIGRAM(S): at 21:33

## 2018-07-20 RX ADMIN — Medication 1 TABLET(S): at 06:14

## 2018-07-20 RX ADMIN — Medication 2: at 08:53

## 2018-07-20 NOTE — PROGRESS NOTE ADULT - PROBLEM SELECTOR PLAN 1
PT/OT-WBAT  IS  DVT PPx-venodynes  Pain Control  Continue Current Tx.  Ortho stable-no further intervention at this time  Rehab when drains out (per plastics).    Kurt Ortega PA-C  Team Pager: #[phone]

## 2018-07-20 NOTE — PROGRESS NOTE ADULT - SUBJECTIVE AND OBJECTIVE BOX
He is awake, alert, and comfortable today.  Pain in right hip/leg improved a bit and well-controlled.    Plastics note much appreciated--will monitor AME output.    Plan:  Continue mobilization and discharge planning.  The patient and family have expressed interest in trying to home with home care.  Will need rehab/PT clearance for this.

## 2018-07-20 NOTE — PROGRESS NOTE ADULT - SUBJECTIVE AND OBJECTIVE BOX
CARDIOLOGY FOLLOW UP - Dr. Sandoval    CC no cp/sob       PHYSICAL EXAM:  T(C): 36.8 (07-20-18 @ 08:22), Max: 37.4 (07-20-18 @ 00:44)  HR: 83 (07-20-18 @ 08:22) (83 - 101)  BP: 114/66 (07-20-18 @ 08:22) (98/66 - 157/86)  RR: 18 (07-20-18 @ 08:22) (18 - 18)  SpO2: 94% (07-20-18 @ 08:22) (93% - 97%)  Wt(kg): --  I&O's Summary    19 Jul 2018 07:01  -  20 Jul 2018 07:00  --------------------------------------------------------  IN: 2090 mL / OUT: 1631 mL / NET: 459 mL        Appearance: Normal	  Cardiovascular: Normal S1 S2,RRR, No JVD, No murmurs  Respiratory: Lungs clear to auscultation	  Gastrointestinal:  Soft, Non-tender, + BS	  Extremities: Normal range of motion, No clubbing, cyanosis or edema        MEDICATIONS  (STANDING):  calcium carbonate 1250 mG  + Vitamin D (OsCal 500 + D) 1 Tablet(s) Oral three times a day  ceFAZolin   IVPB 2000 milliGRAM(s) IV Intermittent every 8 hours  dextrose 5%. 1000 milliLiter(s) (50 mL/Hr) IV Continuous <Continuous>  dextrose 50% Injectable 12.5 Gram(s) IV Push once  dextrose 50% Injectable 25 Gram(s) IV Push once  dextrose 50% Injectable 25 Gram(s) IV Push once  docusate sodium 100 milliGRAM(s) Oral two times a day  heparin  Injectable 5000 Unit(s) SubCutaneous every 12 hours  insulin lispro (HumaLOG) corrective regimen sliding scale   SubCutaneous three times a day before meals  insulin lispro (HumaLOG) corrective regimen sliding scale   SubCutaneous at bedtime  polyethylene glycol 3350 17 Gram(s) Oral daily  pregabalin 50 milliGRAM(s) Oral at bedtime  pregabalin 50 milliGRAM(s) Oral three times a day  senna 2 Tablet(s) Oral at bedtime  simvastatin 20 milliGRAM(s) Oral at bedtime      TELEMETRY: 	    ECG:  	  RADIOLOGY:   DIAGNOSTIC TESTING:  [ ] Echocardiogram:  [ ]  Catheterization:  [ ] Stress Test:    OTHER: 	    LABS:	 	                                14.2   8.54  )-----------( 257      ( 20 Jul 2018 08:19 )             42.2     07-20    136  |  94<L>  |  17  ----------------------------<  172<H>  4.0   |  25  |  1.05    Ca    9.7      20 Jul 2018 06:39

## 2018-07-20 NOTE — PROGRESS NOTE ADULT - SUBJECTIVE AND OBJECTIVE BOX
Pt seen and examined with Dr. Lia ERICKSON    Lumbar incision CDI with nylons.  NO erythema, no drainage, no open areas  HV 35 cc ss  AME 21 cc ss    A/P: HV was removed and all dressings were changed  Continue to monitor drain outputs  Continue dressing changes with silver impregnated dressings  antibiotics while drain in place  will follow

## 2018-07-20 NOTE — PROGRESS NOTE ADULT - SUBJECTIVE AND OBJECTIVE BOX
Post op Day [5 ]    Patient resting without complaints.  No chest pain, SOB, N/V.    T(C): 36.6 (07-20-18 @ 04:29), Max: 37.4 (07-20-18 @ 00:44)  HR: 93 (07-20-18 @ 04:29) (91 - 101)  BP: 157/86 (07-20-18 @ 04:29) (98/66 - 157/86)  RR: 18 (07-20-18 @ 04:29) (18 - 18)  SpO2: 93% (07-20-18 @ 04:29) (93% - 97%)  Wt(kg): --    Exam:  Alert and Oriented, No Acute Distress  Back dsg c/d/i, drains serosang drainage  Calves Soft, Non-tender bilaterally  +PF/DF/EHL/FHL 5/5 bilat  SILT                       14.9   10.06 )-----------( 235      ( 19 Jul 2018 08:10 )             45.4    07-19    135  |  95<L>  |  15  ----------------------------<  179<H>  4.0   |  28  |  1.08    Ca    10.2      19 Jul 2018 07:04

## 2018-07-20 NOTE — PROGRESS NOTE ADULT - SUBJECTIVE AND OBJECTIVE BOX
Chief complaint: spinal stenosis s/p decompression    PAST MEDICAL & SURGICAL HISTORY:  Hypercholesteremia  Diabetes mellitus: ~ diagnosed during week of 08/13 - 19/2017  Abscess  H/O arthroscopy of right knee          REVIEW OF SYSTEMS:  CONSTITUTIONAL: No fever, weight loss, or fatigue  EYES: No eye pain, visual disturbances, or discharge  NECK: No pain or stiffness  RESPIRATORY: No cough, wheezing, chills or hemoptysis; No Shortness of Breath  CARDIOVASCULAR: No chest pain, palpitations, passing out, dizziness, or leg swelling  GASTROINTESTINAL: No abdominal or epigastric pain. No nausea, vomiting, or hematemesis; No diarrhea or constipation. No melena or hematochezia.  GENITOURINARY: No dysuria, frequency, hematuria, or incontinence  NEUROLOGICAL: No headaches, memory loss, loss of strength, numbness, or tremors  pain r hip better       Medications:  MEDICATIONS  (STANDING):  calcium carbonate 1250 mG  + Vitamin D (OsCal 500 + D) 1 Tablet(s) Oral three times a day  ceFAZolin   IVPB 2000 milliGRAM(s) IV Intermittent every 8 hours  dextrose 5%. 1000 milliLiter(s) (50 mL/Hr) IV Continuous <Continuous>  dextrose 50% Injectable 12.5 Gram(s) IV Push once  dextrose 50% Injectable 25 Gram(s) IV Push once  dextrose 50% Injectable 25 Gram(s) IV Push once  docusate sodium 100 milliGRAM(s) Oral two times a day  heparin  Injectable 5000 Unit(s) SubCutaneous every 12 hours  insulin lispro (HumaLOG) corrective regimen sliding scale   SubCutaneous three times a day before meals  insulin lispro (HumaLOG) corrective regimen sliding scale   SubCutaneous at bedtime  polyethylene glycol 3350 17 Gram(s) Oral daily  pregabalin 50 milliGRAM(s) Oral at bedtime  pregabalin 50 milliGRAM(s) Oral three times a day  senna 2 Tablet(s) Oral at bedtime  simvastatin 20 milliGRAM(s) Oral at bedtime    MEDICATIONS  (PRN):  acetaminophen   Tablet 650 milliGRAM(s) Oral every 6 hours PRN For Temp greater than 38 C (100.4 F)  acetaminophen   Tablet. 650 milliGRAM(s) Oral every 6 hours PRN headache  cyclobenzaprine 10 milliGRAM(s) Oral three times a day PRN Muscle Spasm  dextrose 40% Gel 15 Gram(s) Oral once PRN Blood Glucose LESS THAN 70 milliGRAM(s)/deciliter  diazepam    Tablet 5 milliGRAM(s) Oral every 8 hours PRN muscle spasm  diphenhydrAMINE   Injectable 12.5 milliGRAM(s) IV Push every 4 hours PRN Itching  famotidine    Tablet 20 milliGRAM(s) Oral every 12 hours PRN Dyspepsia  glucagon  Injectable 1 milliGRAM(s) IntraMuscular once PRN Glucose LESS THAN 70 milligrams/deciliter  HYDROmorphone  Injectable 1 milliGRAM(s) IV Push every 3 hours PRN Severe Pain (7 - 10)  magnesium hydroxide Suspension 30 milliLiter(s) Oral every 12 hours PRN Constipation  oxyCODONE    IR 10 milliGRAM(s) Oral every 4 hours PRN Moderate Pain (4 - 6)  oxyCODONE    IR 5 milliGRAM(s) Oral every 4 hours PRN Mild Pain (1 - 3)  simethicone 80 milliGRAM(s) Chew every 6 hours PRN Gas    	    PHYSICAL EXAM:  T(C): 36.6 (07-20-18 @ 13:04), Max: 37.4 (07-20-18 @ 00:44)  HR: 88 (07-20-18 @ 13:04) (83 - 101)  BP: 122/82 (07-20-18 @ 13:04) (98/66 - 157/86)  RR: 18 (07-20-18 @ 13:04) (18 - 18)  SpO2: 95% (07-20-18 @ 13:04) (93% - 97%)  Wt(kg): --  I&O's Summary    19 Jul 2018 07:01  -  20 Jul 2018 07:00  --------------------------------------------------------  IN: 2090 mL / OUT: 1631 mL / NET: 459 mL    20 Jul 2018 07:01  -  20 Jul 2018 15:07  --------------------------------------------------------  IN: 800 mL / OUT: 410 mL / NET: 390 mL      Appearance: Normal	  HEENT:   Normal oral mucosa, PERRL, EOMI	  Lymphatic: No lymphadenopathy  Cardiovascular: Normal S1 S2, No JVD, No murmurs, No edema  Respiratory: Lungs clear to auscultation	  Psychiatry: A & O x 3, Mood & affect appropriate  Gastrointestinal:  Soft, Non-tender, + BS	  Skin: No rashes, No ecchymoses, No cyanosis	  Neurologic: Non-focal cn intact  sensory intact / dtr equal   Extremities: dec rom r lext, No clubbing, cyanosis or edema  Vascular: Peripheral pulses palpable 2+ bilaterally    LABS:	 	    CARDIAC MARKERS:                                14.2   8.54  )-----------( 257      ( 20 Jul 2018 08:19 )             42.2     07-20    136  |  94<L>  |  17  ----------------------------<  172<H>  4.0   |  25  |  1.05    Ca    9.7      20 Jul 2018 06:39      proBNP:   Lipid Profile:   HgA1c:   TSH:

## 2018-07-21 LAB
ANION GAP SERPL CALC-SCNC: 12 MMOL/L — SIGNIFICANT CHANGE UP (ref 5–17)
BUN SERPL-MCNC: 16 MG/DL — SIGNIFICANT CHANGE UP (ref 7–23)
CALCIUM SERPL-MCNC: 9.7 MG/DL — SIGNIFICANT CHANGE UP (ref 8.4–10.5)
CHLORIDE SERPL-SCNC: 94 MMOL/L — LOW (ref 96–108)
CO2 SERPL-SCNC: 28 MMOL/L — SIGNIFICANT CHANGE UP (ref 22–31)
CREAT SERPL-MCNC: 1.16 MG/DL — SIGNIFICANT CHANGE UP (ref 0.5–1.3)
GLUCOSE BLDC GLUCOMTR-MCNC: 148 MG/DL — HIGH (ref 70–99)
GLUCOSE BLDC GLUCOMTR-MCNC: 149 MG/DL — HIGH (ref 70–99)
GLUCOSE BLDC GLUCOMTR-MCNC: 184 MG/DL — HIGH (ref 70–99)
GLUCOSE BLDC GLUCOMTR-MCNC: 194 MG/DL — HIGH (ref 70–99)
GLUCOSE SERPL-MCNC: 179 MG/DL — HIGH (ref 70–99)
HCT VFR BLD CALC: 41.7 % — SIGNIFICANT CHANGE UP (ref 39–50)
HGB BLD-MCNC: 14.1 G/DL — SIGNIFICANT CHANGE UP (ref 13–17)
MCHC RBC-ENTMCNC: 30.9 PG — SIGNIFICANT CHANGE UP (ref 27–34)
MCHC RBC-ENTMCNC: 33.8 GM/DL — SIGNIFICANT CHANGE UP (ref 32–36)
MCV RBC AUTO: 91.4 FL — SIGNIFICANT CHANGE UP (ref 80–100)
PLATELET # BLD AUTO: 241 K/UL — SIGNIFICANT CHANGE UP (ref 150–400)
POTASSIUM SERPL-MCNC: 4.1 MMOL/L — SIGNIFICANT CHANGE UP (ref 3.5–5.3)
POTASSIUM SERPL-SCNC: 4.1 MMOL/L — SIGNIFICANT CHANGE UP (ref 3.5–5.3)
RBC # BLD: 4.56 M/UL — SIGNIFICANT CHANGE UP (ref 4.2–5.8)
RBC # FLD: 12.7 % — SIGNIFICANT CHANGE UP (ref 10.3–14.5)
SODIUM SERPL-SCNC: 134 MMOL/L — LOW (ref 135–145)
WBC # BLD: 8.38 K/UL — SIGNIFICANT CHANGE UP (ref 3.8–10.5)
WBC # FLD AUTO: 8.38 K/UL — SIGNIFICANT CHANGE UP (ref 3.8–10.5)

## 2018-07-21 RX ADMIN — Medication 100 MILLIGRAM(S): at 18:31

## 2018-07-21 RX ADMIN — OXYCODONE HYDROCHLORIDE 10 MILLIGRAM(S): 5 TABLET ORAL at 04:01

## 2018-07-21 RX ADMIN — SIMVASTATIN 20 MILLIGRAM(S): 20 TABLET, FILM COATED ORAL at 21:20

## 2018-07-21 RX ADMIN — OXYCODONE HYDROCHLORIDE 10 MILLIGRAM(S): 5 TABLET ORAL at 04:31

## 2018-07-21 RX ADMIN — Medication 50 MILLIGRAM(S): at 14:06

## 2018-07-21 RX ADMIN — Medication 100 MILLIGRAM(S): at 05:35

## 2018-07-21 RX ADMIN — Medication 650 MILLIGRAM(S): at 18:34

## 2018-07-21 RX ADMIN — POLYETHYLENE GLYCOL 3350 17 GRAM(S): 17 POWDER, FOR SOLUTION ORAL at 14:06

## 2018-07-21 RX ADMIN — SENNA PLUS 2 TABLET(S): 8.6 TABLET ORAL at 21:20

## 2018-07-21 RX ADMIN — Medication 2: at 14:14

## 2018-07-21 RX ADMIN — Medication 50 MILLIGRAM(S): at 05:35

## 2018-07-21 RX ADMIN — Medication 1 TABLET(S): at 21:20

## 2018-07-21 RX ADMIN — HEPARIN SODIUM 5000 UNIT(S): 5000 INJECTION INTRAVENOUS; SUBCUTANEOUS at 05:35

## 2018-07-21 RX ADMIN — OXYCODONE HYDROCHLORIDE 10 MILLIGRAM(S): 5 TABLET ORAL at 14:06

## 2018-07-21 RX ADMIN — Medication 1 TABLET(S): at 14:05

## 2018-07-21 RX ADMIN — Medication 650 MILLIGRAM(S): at 18:47

## 2018-07-21 RX ADMIN — OXYCODONE HYDROCHLORIDE 10 MILLIGRAM(S): 5 TABLET ORAL at 14:57

## 2018-07-21 RX ADMIN — Medication 1 TABLET(S): at 05:41

## 2018-07-21 RX ADMIN — Medication 50 MILLIGRAM(S): at 21:20

## 2018-07-21 RX ADMIN — HEPARIN SODIUM 5000 UNIT(S): 5000 INJECTION INTRAVENOUS; SUBCUTANEOUS at 18:31

## 2018-07-21 NOTE — PROGRESS NOTE ADULT - SUBJECTIVE AND OBJECTIVE BOX
The patient is seen lying in bed.  NAD.    On exam, the midline lumbar suture line is clean, dry and intact.  No surrounding erythema, no collections or hematomas.    A/P: The patient is doing well.  The AME drain was removed and all dressings changed.  1) Cont care per primary team  2) Cont local wound care with Silver Impregnated dressings while in hospital  3) Will cont to follow

## 2018-07-21 NOTE — PROGRESS NOTE ADULT - SUBJECTIVE AND OBJECTIVE BOX
CARDIOLOGY FOLLOW UP - Dr. Sandoval    CC no chest pain or sob        PHYSICAL EXAM:  T(C): 37.1 (07-21-18 @ 08:46), Max: 37.3 (07-20-18 @ 16:17)  HR: 74 (07-21-18 @ 08:46) (74 - 93)  BP: 117/80 (07-21-18 @ 08:46) (107/64 - 143/86)  RR: 16 (07-21-18 @ 08:46) (16 - 18)  SpO2: 99% (07-21-18 @ 08:46) (93% - 99%)  Wt(kg): --  I&O's Summary    20 Jul 2018 07:01  -  21 Jul 2018 07:00  --------------------------------------------------------  IN: 1900 mL / OUT: 910 mL / NET: 990 mL        Appearance: Normal	  Cardiovascular: Normal S1 S2,RRR, No JVD, No murmurs  Respiratory: Lungs clear to auscultation	  Gastrointestinal:  Soft, Non-tender, + BS	  Extremities: Normal range of motion, No clubbing, cyanosis or edema        MEDICATIONS  (STANDING):  calcium carbonate 1250 mG  + Vitamin D (OsCal 500 + D) 1 Tablet(s) Oral three times a day  dextrose 5%. 1000 milliLiter(s) (50 mL/Hr) IV Continuous <Continuous>  dextrose 50% Injectable 12.5 Gram(s) IV Push once  dextrose 50% Injectable 25 Gram(s) IV Push once  dextrose 50% Injectable 25 Gram(s) IV Push once  docusate sodium 100 milliGRAM(s) Oral two times a day  heparin  Injectable 5000 Unit(s) SubCutaneous every 12 hours  insulin lispro (HumaLOG) corrective regimen sliding scale   SubCutaneous three times a day before meals  insulin lispro (HumaLOG) corrective regimen sliding scale   SubCutaneous at bedtime  polyethylene glycol 3350 17 Gram(s) Oral daily  pregabalin 50 milliGRAM(s) Oral at bedtime  pregabalin 50 milliGRAM(s) Oral three times a day  senna 2 Tablet(s) Oral at bedtime  simvastatin 20 milliGRAM(s) Oral at bedtime      TELEMETRY: 	    ECG:  	  RADIOLOGY:   DIAGNOSTIC TESTING:  [ ] Echocardiogram:  [ ]  Catheterization:  [ ] Stress Test:    OTHER: 	    LABS:	 	                                14.1   8.38  )-----------( 241      ( 21 Jul 2018 08:41 )             41.7     07-21    134<L>  |  94<L>  |  16  ----------------------------<  179<H>  4.1   |  28  |  1.16    Ca    9.7      21 Jul 2018 06:55

## 2018-07-21 NOTE — PROGRESS NOTE ADULT - SUBJECTIVE AND OBJECTIVE BOX
PAST MEDICAL & SURGICAL HISTORY:  Hypercholesteremia  Diabetes mellitus: ~ diagnosed during week of 08/13 - 19/2017  Abscess  H/O arthroscopy of right knee          REVIEW OF SYSTEMS:  CONSTITUTIONAL: No fever, weight loss, or fatigue  EYES: No eye pain, visual disturbances, or discharge  NECK: No pain or stiffness  RESPIRATORY: No cough, wheezing, chills or hemoptysis; No Shortness of Breath  CARDIOVASCULAR: No chest pain, palpitations, passing out, dizziness, or leg swelling  GASTROINTESTINAL: No abdominal or epigastric pain. No nausea, vomiting, or hematemesis; No diarrhea or constipation. No melena or hematochezia.  GENITOURINARY: No dysuria, frequency, hematuria, or incontinence  NEUROLOGICAL: No headaches, memory loss, loss of strength, numbness, or tremors  MUSCULOSKELETALpain improved  spasms occasionallyu    Medications:  MEDICATIONS  (STANDING):  calcium carbonate 1250 mG  + Vitamin D (OsCal 500 + D) 1 Tablet(s) Oral three times a day  dextrose 5%. 1000 milliLiter(s) (50 mL/Hr) IV Continuous <Continuous>  dextrose 50% Injectable 12.5 Gram(s) IV Push once  dextrose 50% Injectable 25 Gram(s) IV Push once  dextrose 50% Injectable 25 Gram(s) IV Push once  docusate sodium 100 milliGRAM(s) Oral two times a day  heparin  Injectable 5000 Unit(s) SubCutaneous every 12 hours  insulin lispro (HumaLOG) corrective regimen sliding scale   SubCutaneous three times a day before meals  insulin lispro (HumaLOG) corrective regimen sliding scale   SubCutaneous at bedtime  polyethylene glycol 3350 17 Gram(s) Oral daily  pregabalin 50 milliGRAM(s) Oral at bedtime  pregabalin 50 milliGRAM(s) Oral three times a day  senna 2 Tablet(s) Oral at bedtime  simvastatin 20 milliGRAM(s) Oral at bedtime    MEDICATIONS  (PRN):  acetaminophen   Tablet 650 milliGRAM(s) Oral every 6 hours PRN For Temp greater than 38 C (100.4 F)  acetaminophen   Tablet. 650 milliGRAM(s) Oral every 6 hours PRN headache  cyclobenzaprine 10 milliGRAM(s) Oral three times a day PRN Muscle Spasm  dextrose 40% Gel 15 Gram(s) Oral once PRN Blood Glucose LESS THAN 70 milliGRAM(s)/deciliter  diazepam    Tablet 5 milliGRAM(s) Oral every 8 hours PRN muscle spasm  diphenhydrAMINE   Injectable 12.5 milliGRAM(s) IV Push every 4 hours PRN Itching  famotidine    Tablet 20 milliGRAM(s) Oral every 12 hours PRN Dyspepsia  glucagon  Injectable 1 milliGRAM(s) IntraMuscular once PRN Glucose LESS THAN 70 milligrams/deciliter  HYDROmorphone  Injectable 1 milliGRAM(s) IV Push every 3 hours PRN Severe Pain (7 - 10)  magnesium hydroxide Suspension 30 milliLiter(s) Oral every 12 hours PRN Constipation  oxyCODONE    IR 10 milliGRAM(s) Oral every 4 hours PRN Moderate Pain (4 - 6)  oxyCODONE    IR 5 milliGRAM(s) Oral every 4 hours PRN Mild Pain (1 - 3)  simethicone 80 milliGRAM(s) Chew every 6 hours PRN Gas    	    PHYSICAL EXAM:  T(C): 37.1 (07-21-18 @ 08:46), Max: 37.3 (07-20-18 @ 16:17)  HR: 74 (07-21-18 @ 08:46) (74 - 93)  BP: 117/80 (07-21-18 @ 08:46) (107/64 - 143/86)  RR: 16 (07-21-18 @ 08:46) (16 - 18)  SpO2: 99% (07-21-18 @ 08:46) (93% - 99%)  Wt(kg): --  I&O's Summary    20 Jul 2018 07:01  -  21 Jul 2018 07:00  --------------------------------------------------------  IN: 1900 mL / OUT: 910 mL / NET: 990 mL        Appearance: Normal	  HEENT:   Normal oral mucosa, PERRL, EOMI	  Lymphatic: No lymphadenopathy  Cardiovascular: Normal S1 S2, No JVD, No murmurs, No edema  Respiratory: Lungs clear to auscultation	  Psychiatry: A & O x 3, Mood & affect appropriate  Gastrointestinal:  Soft, Non-tender, + BS	  Skin: No rashes, No ecchymoses, No cyanosis	  Neurologic: Non-focal  dec rom rlextsecondary to pain   Extremities: Normal range of motion, No clubbing, cyanosis or edema  Vascular: Peripheral pulses palpable 2+ bilaterally    TELEMETRY: 	    ECG:  	  RADIOLOGY:  OTHER: 	  	  LABS:	 	    CARDIAC MARKERS:                                14.2   8.54  )-----------( 257      ( 20 Jul 2018 08:19 )             42.2     07-21    134<L>  |  94<L>  |  16  ----------------------------<  179<H>  4.1   |  28  |  1.16    Ca    9.7      21 Jul 2018 06:55      proBNP:   Lipid Profile:   HgA1c:   TSH:

## 2018-07-22 LAB
ANION GAP SERPL CALC-SCNC: 13 MMOL/L — SIGNIFICANT CHANGE UP (ref 5–17)
BUN SERPL-MCNC: 16 MG/DL — SIGNIFICANT CHANGE UP (ref 7–23)
CALCIUM SERPL-MCNC: 9.8 MG/DL — SIGNIFICANT CHANGE UP (ref 8.4–10.5)
CHLORIDE SERPL-SCNC: 96 MMOL/L — SIGNIFICANT CHANGE UP (ref 96–108)
CO2 SERPL-SCNC: 27 MMOL/L — SIGNIFICANT CHANGE UP (ref 22–31)
CREAT SERPL-MCNC: 1.04 MG/DL — SIGNIFICANT CHANGE UP (ref 0.5–1.3)
GLUCOSE BLDC GLUCOMTR-MCNC: 119 MG/DL — HIGH (ref 70–99)
GLUCOSE BLDC GLUCOMTR-MCNC: 133 MG/DL — HIGH (ref 70–99)
GLUCOSE BLDC GLUCOMTR-MCNC: 148 MG/DL — HIGH (ref 70–99)
GLUCOSE BLDC GLUCOMTR-MCNC: 169 MG/DL — HIGH (ref 70–99)
GLUCOSE SERPL-MCNC: 169 MG/DL — HIGH (ref 70–99)
HCT VFR BLD CALC: 42.8 % — SIGNIFICANT CHANGE UP (ref 39–50)
HGB BLD-MCNC: 14.3 G/DL — SIGNIFICANT CHANGE UP (ref 13–17)
MCHC RBC-ENTMCNC: 30.6 PG — SIGNIFICANT CHANGE UP (ref 27–34)
MCHC RBC-ENTMCNC: 33.4 GM/DL — SIGNIFICANT CHANGE UP (ref 32–36)
MCV RBC AUTO: 91.5 FL — SIGNIFICANT CHANGE UP (ref 80–100)
PLATELET # BLD AUTO: 262 K/UL — SIGNIFICANT CHANGE UP (ref 150–400)
POTASSIUM SERPL-MCNC: 4.3 MMOL/L — SIGNIFICANT CHANGE UP (ref 3.5–5.3)
POTASSIUM SERPL-SCNC: 4.3 MMOL/L — SIGNIFICANT CHANGE UP (ref 3.5–5.3)
RBC # BLD: 4.68 M/UL — SIGNIFICANT CHANGE UP (ref 4.2–5.8)
RBC # FLD: 12.4 % — SIGNIFICANT CHANGE UP (ref 10.3–14.5)
SODIUM SERPL-SCNC: 136 MMOL/L — SIGNIFICANT CHANGE UP (ref 135–145)
WBC # BLD: 7.4 K/UL — SIGNIFICANT CHANGE UP (ref 3.8–10.5)
WBC # FLD AUTO: 7.4 K/UL — SIGNIFICANT CHANGE UP (ref 3.8–10.5)

## 2018-07-22 RX ADMIN — SIMVASTATIN 20 MILLIGRAM(S): 20 TABLET, FILM COATED ORAL at 22:05

## 2018-07-22 RX ADMIN — Medication 5 MILLIGRAM(S): at 22:05

## 2018-07-22 RX ADMIN — Medication 1 TABLET(S): at 14:43

## 2018-07-22 RX ADMIN — Medication 50 MILLIGRAM(S): at 22:05

## 2018-07-22 RX ADMIN — Medication 1 TABLET(S): at 07:00

## 2018-07-22 RX ADMIN — Medication 50 MILLIGRAM(S): at 14:43

## 2018-07-22 RX ADMIN — Medication 100 MILLIGRAM(S): at 18:46

## 2018-07-22 RX ADMIN — HEPARIN SODIUM 5000 UNIT(S): 5000 INJECTION INTRAVENOUS; SUBCUTANEOUS at 07:00

## 2018-07-22 RX ADMIN — Medication 100 MILLIGRAM(S): at 07:00

## 2018-07-22 RX ADMIN — Medication 1 TABLET(S): at 22:05

## 2018-07-22 RX ADMIN — OXYCODONE HYDROCHLORIDE 10 MILLIGRAM(S): 5 TABLET ORAL at 07:06

## 2018-07-22 RX ADMIN — HEPARIN SODIUM 5000 UNIT(S): 5000 INJECTION INTRAVENOUS; SUBCUTANEOUS at 18:46

## 2018-07-22 RX ADMIN — POLYETHYLENE GLYCOL 3350 17 GRAM(S): 17 POWDER, FOR SOLUTION ORAL at 14:44

## 2018-07-22 RX ADMIN — Medication 50 MILLIGRAM(S): at 07:05

## 2018-07-22 RX ADMIN — OXYCODONE HYDROCHLORIDE 10 MILLIGRAM(S): 5 TABLET ORAL at 07:36

## 2018-07-22 RX ADMIN — Medication 2: at 09:37

## 2018-07-22 RX ADMIN — SENNA PLUS 2 TABLET(S): 8.6 TABLET ORAL at 22:05

## 2018-07-22 NOTE — PROGRESS NOTE ADULT - SUBJECTIVE AND OBJECTIVE BOX
PAST MEDICAL & SURGICAL HISTORY:  Hypercholesteremia  Diabetes mellitus: ~ diagnosed during week of 08/13 - 19/2017  Abscess  H/O arthroscopy of right knee          REVIEW OF SYSTEMS:  CONSTITUTIONAL: No fever, weight loss, or fatigue  EYES: No eye pain, visual disturbances, or discharge  NECK: No pain or stiffness  RESPIRATORY: No cough, wheezing, chills or hemoptysis; No Shortness of Breath  CARDIOVASCULAR: No chest pain, palpitations, passing out, dizziness, or leg swelling  GASTROINTESTINAL: No abdominal or epigastric pain. No nausea, vomiting, or hematemesis; No diarrhea or constipation. No melena or hematochezia.  GENITOURINARY: No dysuria, frequency, hematuria, or incontinence  NEUROLOGICAL: No headaches, memory loss, loss of strength, numbness, or tremors  MUSCULOSKELETAL:pain improved    Medications:  MEDICATIONS  (STANDING):  calcium carbonate 1250 mG  + Vitamin D (OsCal 500 + D) 1 Tablet(s) Oral three times a day  dextrose 5%. 1000 milliLiter(s) (50 mL/Hr) IV Continuous <Continuous>  dextrose 50% Injectable 12.5 Gram(s) IV Push once  dextrose 50% Injectable 25 Gram(s) IV Push once  dextrose 50% Injectable 25 Gram(s) IV Push once  docusate sodium 100 milliGRAM(s) Oral two times a day  heparin  Injectable 5000 Unit(s) SubCutaneous every 12 hours  insulin lispro (HumaLOG) corrective regimen sliding scale   SubCutaneous three times a day before meals  insulin lispro (HumaLOG) corrective regimen sliding scale   SubCutaneous at bedtime  polyethylene glycol 3350 17 Gram(s) Oral daily  pregabalin 50 milliGRAM(s) Oral at bedtime  pregabalin 50 milliGRAM(s) Oral three times a day  senna 2 Tablet(s) Oral at bedtime  simvastatin 20 milliGRAM(s) Oral at bedtime    MEDICATIONS  (PRN):  acetaminophen   Tablet 650 milliGRAM(s) Oral every 6 hours PRN For Temp greater than 38 C (100.4 F)  acetaminophen   Tablet. 650 milliGRAM(s) Oral every 6 hours PRN headache  cyclobenzaprine 10 milliGRAM(s) Oral three times a day PRN Muscle Spasm  dextrose 40% Gel 15 Gram(s) Oral once PRN Blood Glucose LESS THAN 70 milliGRAM(s)/deciliter  diazepam    Tablet 5 milliGRAM(s) Oral every 8 hours PRN muscle spasm  diphenhydrAMINE   Injectable 12.5 milliGRAM(s) IV Push every 4 hours PRN Itching  famotidine    Tablet 20 milliGRAM(s) Oral every 12 hours PRN Dyspepsia  glucagon  Injectable 1 milliGRAM(s) IntraMuscular once PRN Glucose LESS THAN 70 milligrams/deciliter  HYDROmorphone  Injectable 1 milliGRAM(s) IV Push every 3 hours PRN Severe Pain (7 - 10)  magnesium hydroxide Suspension 30 milliLiter(s) Oral every 12 hours PRN Constipation  oxyCODONE    IR 10 milliGRAM(s) Oral every 4 hours PRN Moderate Pain (4 - 6)  oxyCODONE    IR 5 milliGRAM(s) Oral every 4 hours PRN Mild Pain (1 - 3)  simethicone 80 milliGRAM(s) Chew every 6 hours PRN Gas    	    PHYSICAL EXAM:  T(C): 36.9 (07-22-18 @ 08:31), Max: 37.3 (07-22-18 @ 06:55)  HR: 79 (07-22-18 @ 08:31) (74 - 94)  BP: 118/70 (07-22-18 @ 08:31) (118/70 - 130/85)  RR: 16 (07-22-18 @ 08:31) (16 - 18)  SpO2: 93% (07-22-18 @ 08:31) (93% - 99%)  Wt(kg): --  I&O's Summary    21 Jul 2018 07:01  -  22 Jul 2018 07:00  --------------------------------------------------------  IN: 1280 mL / OUT: 950 mL / NET: 330 mL    22 Jul 2018 07:01  -  22 Jul 2018 12:36  --------------------------------------------------------  IN: 560 mL / OUT: 500 mL / NET: 60 mL        Appearance: Normal	  HEENT:   Normal oral mucosa, PERRL, EOMI	  Lymphatic: No lymphadenopathy  Cardiovascular: Normal S1 S2, No JVD, No murmurs, No edema  Respiratory: Lungs clear to auscultation	  Psychiatry: A & O x 3, Mood & affect appropriate  Gastrointestinal:  Soft, Non-tender, + BS	  Skin: No rashes, No ecchymoses, No cyanosis	  Neurologic: Non-focal  Extremities: Normal range of motion, No clubbing, cyanosis or edema  Vascular: Peripheral pulses palpable 2+ bilaterally    TELEMETRY: 	    ECG:  	  RADIOLOGY:  OTHER: 	  	  LABS:	 	    CARDIAC MARKERS:                                14.3   7.40  )-----------( 262      ( 22 Jul 2018 07:59 )             42.8     07-22    136  |  96  |  16  ----------------------------<  169<H>  4.3   |  27  |  1.04    Ca    9.8      22 Jul 2018 06:57      proBNP:   Lipid Profile:   HgA1c:   TSH:

## 2018-07-22 NOTE — PROGRESS NOTE ADULT - SUBJECTIVE AND OBJECTIVE BOX
CARDIOLOGY FOLLOW UP - Dr. Sandoval    CC no chest pain or sob        PHYSICAL EXAM:  T(C): 36.9 (07-22-18 @ 08:31), Max: 37.3 (07-22-18 @ 06:55)  HR: 79 (07-22-18 @ 08:31) (74 - 94)  BP: 118/70 (07-22-18 @ 08:31) (118/70 - 130/85)  RR: 16 (07-22-18 @ 08:31) (16 - 18)  SpO2: 93% (07-22-18 @ 08:31) (93% - 99%)  Wt(kg): --  I&O's Summary    21 Jul 2018 07:01  -  22 Jul 2018 07:00  --------------------------------------------------------  IN: 1280 mL / OUT: 950 mL / NET: 330 mL    22 Jul 2018 07:01  -  22 Jul 2018 12:08  --------------------------------------------------------  IN: 560 mL / OUT: 500 mL / NET: 60 mL        Appearance: Normal	  Cardiovascular: Normal S1 S2,RRR, No JVD, No murmurs  Respiratory: Lungs clear to auscultation	  Gastrointestinal:  Soft, Non-tender, + BS	  Extremities: Normal range of motion, No clubbing, cyanosis or edema        MEDICATIONS  (STANDING):  calcium carbonate 1250 mG  + Vitamin D (OsCal 500 + D) 1 Tablet(s) Oral three times a day  dextrose 5%. 1000 milliLiter(s) (50 mL/Hr) IV Continuous <Continuous>  dextrose 50% Injectable 12.5 Gram(s) IV Push once  dextrose 50% Injectable 25 Gram(s) IV Push once  dextrose 50% Injectable 25 Gram(s) IV Push once  docusate sodium 100 milliGRAM(s) Oral two times a day  heparin  Injectable 5000 Unit(s) SubCutaneous every 12 hours  insulin lispro (HumaLOG) corrective regimen sliding scale   SubCutaneous three times a day before meals  insulin lispro (HumaLOG) corrective regimen sliding scale   SubCutaneous at bedtime  polyethylene glycol 3350 17 Gram(s) Oral daily  pregabalin 50 milliGRAM(s) Oral at bedtime  pregabalin 50 milliGRAM(s) Oral three times a day  senna 2 Tablet(s) Oral at bedtime  simvastatin 20 milliGRAM(s) Oral at bedtime      TELEMETRY: 	    ECG:  	  RADIOLOGY:   DIAGNOSTIC TESTING:  [ ] Echocardiogram:  [ ]  Catheterization:  [ ] Stress Test:    OTHER: 	    LABS:	 	                                14.3   7.40  )-----------( 262      ( 22 Jul 2018 07:59 )             42.8     07-22    136  |  96  |  16  ----------------------------<  169<H>  4.3   |  27  |  1.04    Ca    9.8      22 Jul 2018 06:57

## 2018-07-23 ENCOUNTER — TRANSCRIPTION ENCOUNTER (OUTPATIENT)
Age: 48
End: 2018-07-23

## 2018-07-23 VITALS
TEMPERATURE: 99 F | RESPIRATION RATE: 17 BRPM | DIASTOLIC BLOOD PRESSURE: 74 MMHG | HEART RATE: 85 BPM | SYSTOLIC BLOOD PRESSURE: 103 MMHG | OXYGEN SATURATION: 98 %

## 2018-07-23 LAB
ANION GAP SERPL CALC-SCNC: 15 MMOL/L — SIGNIFICANT CHANGE UP (ref 5–17)
BUN SERPL-MCNC: 15 MG/DL — SIGNIFICANT CHANGE UP (ref 7–23)
CALCIUM SERPL-MCNC: 9.9 MG/DL — SIGNIFICANT CHANGE UP (ref 8.4–10.5)
CHLORIDE SERPL-SCNC: 95 MMOL/L — LOW (ref 96–108)
CO2 SERPL-SCNC: 27 MMOL/L — SIGNIFICANT CHANGE UP (ref 22–31)
CREAT SERPL-MCNC: 1.12 MG/DL — SIGNIFICANT CHANGE UP (ref 0.5–1.3)
GLUCOSE BLDC GLUCOMTR-MCNC: 143 MG/DL — HIGH (ref 70–99)
GLUCOSE BLDC GLUCOMTR-MCNC: 166 MG/DL — HIGH (ref 70–99)
GLUCOSE SERPL-MCNC: 158 MG/DL — HIGH (ref 70–99)
HCT VFR BLD CALC: 41.6 % — SIGNIFICANT CHANGE UP (ref 39–50)
HGB BLD-MCNC: 13.5 G/DL — SIGNIFICANT CHANGE UP (ref 13–17)
MCHC RBC-ENTMCNC: 29.5 PG — SIGNIFICANT CHANGE UP (ref 27–34)
MCHC RBC-ENTMCNC: 32.5 GM/DL — SIGNIFICANT CHANGE UP (ref 32–36)
MCV RBC AUTO: 91 FL — SIGNIFICANT CHANGE UP (ref 80–100)
PLATELET # BLD AUTO: 307 K/UL — SIGNIFICANT CHANGE UP (ref 150–400)
POTASSIUM SERPL-MCNC: 4.2 MMOL/L — SIGNIFICANT CHANGE UP (ref 3.5–5.3)
POTASSIUM SERPL-SCNC: 4.2 MMOL/L — SIGNIFICANT CHANGE UP (ref 3.5–5.3)
RBC # BLD: 4.57 M/UL — SIGNIFICANT CHANGE UP (ref 4.2–5.8)
RBC # FLD: 12.3 % — SIGNIFICANT CHANGE UP (ref 10.3–14.5)
SODIUM SERPL-SCNC: 137 MMOL/L — SIGNIFICANT CHANGE UP (ref 135–145)
WBC # BLD: 9.04 K/UL — SIGNIFICANT CHANGE UP (ref 3.8–10.5)
WBC # FLD AUTO: 9.04 K/UL — SIGNIFICANT CHANGE UP (ref 3.8–10.5)

## 2018-07-23 PROCEDURE — 84484 ASSAY OF TROPONIN QUANT: CPT

## 2018-07-23 PROCEDURE — 85027 COMPLETE CBC AUTOMATED: CPT

## 2018-07-23 PROCEDURE — 85730 THROMBOPLASTIN TIME PARTIAL: CPT

## 2018-07-23 PROCEDURE — 93970 EXTREMITY STUDY: CPT

## 2018-07-23 PROCEDURE — 97530 THERAPEUTIC ACTIVITIES: CPT

## 2018-07-23 PROCEDURE — 97161 PT EVAL LOW COMPLEX 20 MIN: CPT

## 2018-07-23 PROCEDURE — 97116 GAIT TRAINING THERAPY: CPT

## 2018-07-23 PROCEDURE — 97112 NEUROMUSCULAR REEDUCATION: CPT

## 2018-07-23 PROCEDURE — 86901 BLOOD TYPING SEROLOGIC RH(D): CPT

## 2018-07-23 PROCEDURE — 82150 ASSAY OF AMYLASE: CPT

## 2018-07-23 PROCEDURE — 81003 URINALYSIS AUTO W/O SCOPE: CPT

## 2018-07-23 PROCEDURE — 72020 X-RAY EXAM OF SPINE 1 VIEW: CPT

## 2018-07-23 PROCEDURE — 74018 RADEX ABDOMEN 1 VIEW: CPT

## 2018-07-23 PROCEDURE — 71045 X-RAY EXAM CHEST 1 VIEW: CPT

## 2018-07-23 PROCEDURE — 86900 BLOOD TYPING SEROLOGIC ABO: CPT

## 2018-07-23 PROCEDURE — 80053 COMPREHEN METABOLIC PANEL: CPT

## 2018-07-23 PROCEDURE — 85610 PROTHROMBIN TIME: CPT

## 2018-07-23 PROCEDURE — C1889: CPT

## 2018-07-23 PROCEDURE — 88304 TISSUE EXAM BY PATHOLOGIST: CPT

## 2018-07-23 PROCEDURE — 73502 X-RAY EXAM HIP UNI 2-3 VIEWS: CPT

## 2018-07-23 PROCEDURE — 93005 ELECTROCARDIOGRAM TRACING: CPT

## 2018-07-23 PROCEDURE — 86850 RBC ANTIBODY SCREEN: CPT

## 2018-07-23 PROCEDURE — 80048 BASIC METABOLIC PNL TOTAL CA: CPT

## 2018-07-23 PROCEDURE — 82962 GLUCOSE BLOOD TEST: CPT

## 2018-07-23 PROCEDURE — 83690 ASSAY OF LIPASE: CPT

## 2018-07-23 RX ORDER — OXYCODONE HYDROCHLORIDE 5 MG/1
1 TABLET ORAL
Qty: 30 | Refills: 0 | OUTPATIENT
Start: 2018-07-23 | End: 2018-07-27

## 2018-07-23 RX ORDER — FAMOTIDINE 10 MG/ML
1 INJECTION INTRAVENOUS
Qty: 60 | Refills: 0 | OUTPATIENT
Start: 2018-07-23 | End: 2018-08-21

## 2018-07-23 RX ORDER — FAMOTIDINE 10 MG/ML
1 INJECTION INTRAVENOUS
Qty: 60 | Refills: 0
Start: 2018-07-23 | End: 2018-08-21

## 2018-07-23 RX ORDER — ATORVASTATIN CALCIUM 80 MG/1
1 TABLET, FILM COATED ORAL
Qty: 0 | Refills: 0 | COMMUNITY

## 2018-07-23 RX ORDER — OXYCODONE HYDROCHLORIDE 5 MG/1
1 TABLET ORAL
Qty: 30 | Refills: 0
Start: 2018-07-23 | End: 2018-07-27

## 2018-07-23 RX ADMIN — Medication 100 MILLIGRAM(S): at 05:31

## 2018-07-23 RX ADMIN — Medication 1 TABLET(S): at 15:18

## 2018-07-23 RX ADMIN — Medication 1 TABLET(S): at 05:31

## 2018-07-23 RX ADMIN — OXYCODONE HYDROCHLORIDE 10 MILLIGRAM(S): 5 TABLET ORAL at 11:06

## 2018-07-23 RX ADMIN — HEPARIN SODIUM 5000 UNIT(S): 5000 INJECTION INTRAVENOUS; SUBCUTANEOUS at 05:32

## 2018-07-23 RX ADMIN — OXYCODONE HYDROCHLORIDE 10 MILLIGRAM(S): 5 TABLET ORAL at 12:18

## 2018-07-23 RX ADMIN — Medication 2: at 08:15

## 2018-07-23 NOTE — DISCHARGE NOTE ADULT - CARE PLAN
Principal Discharge DX:	S/P laminectomy  Goal:	resolution of symptoms  Assessment and plan of treatment:	may shower at home  follow up with Dr. Moreno within 2 weeks for suture removal  Secondary Diagnosis:	Diabetes mellitus  Assessment and plan of treatment:	HgA1C this admission.  Make sure you get your HgA1c checked every three months.  If you take oral diabetes medications, check your blood glucose two times a day.  If you take insulin, check your blood glucose before meals and at bedtime.  It's important not to skip any meals.  Keep a log of your blood glucose results and always take it with you to your doctor appointments.  Keep a list of your current medications including injectables and over the counter medications and bring this medication list with you to all your doctor appointments.  If you have not seen your ophthalmologist this year call for appointment.  Check your feet daily for redness, sores, or openings. Do not self treat. If no improvement in two days call your primary care physician for an appointment.  Low blood sugar (hypoglycemia) is a blood sugar below 70mg/dl. Check your blood sugar if you feel signs/symptoms of hypoglycemia. If your blood sugar is below 70 take 15 grams of carbohydrates (ex 4 oz of apple juice, 3-4 glucose tablets, or 4-6 oz of regular soda) wait 15 minutes and repeat blood sugar to make sure it comes up above 70.  If your blood sugar is above 70 and you are due for a meal, have a meal.  If you are not due for a meal have a snack.  This snack helps keeps your blood sugar at a safe range.  Secondary Diagnosis:	Hypercholesteremia  Assessment and plan of treatment:	continue current treatment plan  follow up with PMD within 1 week of discharge

## 2018-07-23 NOTE — PROGRESS NOTE ADULT - SUBJECTIVE AND OBJECTIVE BOX
CARDIOLOGY FOLLOW UP - Dr. Sandoval    CC no chest pain or sob        PHYSICAL EXAM:  T(C): 36.8 (07-23-18 @ 08:52), Max: 37.4 (07-22-18 @ 21:27)  HR: 86 (07-23-18 @ 08:52) (76 - 96)  BP: 142/87 (07-23-18 @ 08:52) (109/72 - 142/87)  RR: 17 (07-23-18 @ 08:52) (16 - 18)  SpO2: 97% (07-23-18 @ 08:52) (94% - 97%)  Wt(kg): --  I&O's Summary    22 Jul 2018 07:01  -  23 Jul 2018 07:00  --------------------------------------------------------  IN: 1910 mL / OUT: 1900 mL / NET: 10 mL        Appearance: Normal	  Cardiovascular: Normal S1 S2,RRR, No JVD, No murmurs  Respiratory: Lungs clear to auscultation	  Gastrointestinal:  Soft, Non-tender, + BS	  Extremities: Normal range of motion, No clubbing, cyanosis or edema        MEDICATIONS  (STANDING):  calcium carbonate 1250 mG  + Vitamin D (OsCal 500 + D) 1 Tablet(s) Oral three times a day  dextrose 5%. 1000 milliLiter(s) (50 mL/Hr) IV Continuous <Continuous>  dextrose 50% Injectable 12.5 Gram(s) IV Push once  dextrose 50% Injectable 25 Gram(s) IV Push once  dextrose 50% Injectable 25 Gram(s) IV Push once  docusate sodium 100 milliGRAM(s) Oral two times a day  heparin  Injectable 5000 Unit(s) SubCutaneous every 12 hours  insulin lispro (HumaLOG) corrective regimen sliding scale   SubCutaneous three times a day before meals  insulin lispro (HumaLOG) corrective regimen sliding scale   SubCutaneous at bedtime  polyethylene glycol 3350 17 Gram(s) Oral daily  senna 2 Tablet(s) Oral at bedtime  simvastatin 20 milliGRAM(s) Oral at bedtime      TELEMETRY: 	    ECG:  	  RADIOLOGY:   DIAGNOSTIC TESTING:  [ ] Echocardiogram:  [ ]  Catheterization:  [ ] Stress Test:    OTHER: 	    LABS:	 	                                13.5   9.04  )-----------( 307      ( 23 Jul 2018 07:34 )             41.6     07-23    137  |  95<L>  |  15  ----------------------------<  158<H>  4.2   |  27  |  1.12    Ca    9.9      23 Jul 2018 06:52

## 2018-07-23 NOTE — DISCHARGE NOTE ADULT - HOSPITAL COURSE
Pt seen and examined  AVSS NAD    Lumbar incision: CDI with nylons.  No erythema, no drainage, no open areas    A/P: Appreciate plan for DC home  pt may shower once home; face front of body to water for 1 week, then normal shower  follow up with Dr. Moreno in 2 wks for suture removal call to make appt [phone]

## 2018-07-23 NOTE — PROGRESS NOTE ADULT - SUBJECTIVE AND OBJECTIVE BOX
PAST MEDICAL & SURGICAL HISTORY:  Hypercholesteremia  Diabetes mellitus: ~ diagnosed during week of 08/13 - 19/2017  Abscess  H/O arthroscopy of right knee          REVIEW OF SYSTEMS:  CONSTITUTIONAL: No fever, weight loss, or fatigue  EYES: No eye pain, visual disturbances, or discharge  NECK: No pain or stiffness  RESPIRATORY: No cough, wheezing, chills or hemoptysis; No Shortness of Breath  CARDIOVASCULAR: No chest pain, palpitations, passing out, dizziness, or leg swelling  GASTROINTESTINAL: No abdominal or epigastric pain. No nausea, vomiting, or hematemesis; No diarrhea or constipation. No melena or hematochezia.  GENITOURINARY: No dysuria, frequency, hematuria, or incontinence  NEUROLOGICAL: No headaches, memory loss, loss of strength, numbness, or tremors  SKIN: No itching, burning, rashes, or lesions   less pain /still difficult to move around     Medications:  MEDICATIONS  (STANDING):  calcium carbonate 1250 mG  + Vitamin D (OsCal 500 + D) 1 Tablet(s) Oral three times a day  dextrose 5%. 1000 milliLiter(s) (50 mL/Hr) IV Continuous <Continuous>  dextrose 50% Injectable 12.5 Gram(s) IV Push once  dextrose 50% Injectable 25 Gram(s) IV Push once  dextrose 50% Injectable 25 Gram(s) IV Push once  docusate sodium 100 milliGRAM(s) Oral two times a day  heparin  Injectable 5000 Unit(s) SubCutaneous every 12 hours  insulin lispro (HumaLOG) corrective regimen sliding scale   SubCutaneous three times a day before meals  insulin lispro (HumaLOG) corrective regimen sliding scale   SubCutaneous at bedtime  polyethylene glycol 3350 17 Gram(s) Oral daily  senna 2 Tablet(s) Oral at bedtime  simvastatin 20 milliGRAM(s) Oral at bedtime    MEDICATIONS  (PRN):  acetaminophen   Tablet 650 milliGRAM(s) Oral every 6 hours PRN For Temp greater than 38 C (100.4 F)  acetaminophen   Tablet. 650 milliGRAM(s) Oral every 6 hours PRN headache  cyclobenzaprine 10 milliGRAM(s) Oral three times a day PRN Muscle Spasm  dextrose 40% Gel 15 Gram(s) Oral once PRN Blood Glucose LESS THAN 70 milliGRAM(s)/deciliter  diphenhydrAMINE   Injectable 12.5 milliGRAM(s) IV Push every 4 hours PRN Itching  famotidine    Tablet 20 milliGRAM(s) Oral every 12 hours PRN Dyspepsia  glucagon  Injectable 1 milliGRAM(s) IntraMuscular once PRN Glucose LESS THAN 70 milligrams/deciliter  magnesium hydroxide Suspension 30 milliLiter(s) Oral every 12 hours PRN Constipation  oxyCODONE    IR 10 milliGRAM(s) Oral every 4 hours PRN Moderate Pain (4 - 6)  oxyCODONE    IR 5 milliGRAM(s) Oral every 4 hours PRN Mild Pain (1 - 3)  simethicone 80 milliGRAM(s) Chew every 6 hours PRN Gas    	    PHYSICAL EXAM:  T(C): 36.8 (07-23-18 @ 08:52), Max: 37.4 (07-22-18 @ 21:27)  HR: 86 (07-23-18 @ 08:52) (76 - 96)  BP: 142/87 (07-23-18 @ 08:52) (109/72 - 142/87)  RR: 17 (07-23-18 @ 08:52) (16 - 18)  SpO2: 97% (07-23-18 @ 08:52) (94% - 97%)  Wt(kg): --  I&O's Summary    22 Jul 2018 07:01  -  23 Jul 2018 07:00  --------------------------------------------------------  IN: 1910 mL / OUT: 1900 mL / NET: 10 mL        Appearance: Normal	  HEENT:   Normal oral mucosa, PERRL, EOMI	  Lymphatic: No lymphadenopathy  Cardiovascular: Normal S1 S2, No JVD, No murmurs, No edema  Respiratory: Lungs clear to auscultation	  Psychiatry: A & O x 3, Mood & affect appropriate  Gastrointestinal:  Soft, Non-tender, + BS	  Skin: No rashes, No ecchymoses, No cyanosis	  Neurologic: Non-focal motor equal b/l decreased   sensory intact  Extremities: Normal range of motion, No clubbing, cyanosis or edema  Vascular: Peripheral pulses palpable 2+ bilaterally    TELEMETRY: 	    ECG:  	  RADIOLOGY:  OTHER: 	  	  LABS:	 	    CARDIAC MARKERS:                                13.5   9.04  )-----------( 307      ( 23 Jul 2018 07:34 )             41.6     07-23    137  |  95<L>  |  15  ----------------------------<  158<H>  4.2   |  27  |  1.12    Ca    9.9      23 Jul 2018 06:52      proBNP:   Lipid Profile:   HgA1c:   TSH:

## 2018-07-23 NOTE — PROGRESS NOTE ADULT - ATTENDING COMMENTS
Agree with above NP note.  cv stable  no further chest pain  d/c planning   out pt f/u
Agree with above NP note.  cv stable  no further cp  d/c planning per medicine
Agree with above NP note.  remains cv stable post op  cont current tx
Agree with above NP note.  remains cv stable post op  cont current tx
agree with above NP note.  chest pain as above  no evidence of acs/mi  pt refusing further inpt cardiac workup inc echo/stress  cont current tx  d/c planning   otpt f/u
agree with above NP note.  cv stable  no evidence of acs/mi  pt refusing further inpt cardiac workup inc echo/stress  cont current tx  d/c planning   otpt f/u
agree with above NP note.  resolved chest pain  pt refusing any inpatient cardiac workup  d/c planning per medicine
Agree with above NP note.  remains cv stable post op  cont current tx

## 2018-07-23 NOTE — DISCHARGE NOTE ADULT - PLAN OF CARE
resolution of symptoms may shower at home  follow up with Dr. Moreno within 2 weeks for suture removal HgA1C this admission.  Make sure you get your HgA1c checked every three months.  If you take oral diabetes medications, check your blood glucose two times a day.  If you take insulin, check your blood glucose before meals and at bedtime.  It's important not to skip any meals.  Keep a log of your blood glucose results and always take it with you to your doctor appointments.  Keep a list of your current medications including injectables and over the counter medications and bring this medication list with you to all your doctor appointments.  If you have not seen your ophthalmologist this year call for appointment.  Check your feet daily for redness, sores, or openings. Do not self treat. If no improvement in two days call your primary care physician for an appointment.  Low blood sugar (hypoglycemia) is a blood sugar below 70mg/dl. Check your blood sugar if you feel signs/symptoms of hypoglycemia. If your blood sugar is below 70 take 15 grams of carbohydrates (ex 4 oz of apple juice, 3-4 glucose tablets, or 4-6 oz of regular soda) wait 15 minutes and repeat blood sugar to make sure it comes up above 70.  If your blood sugar is above 70 and you are due for a meal, have a meal.  If you are not due for a meal have a snack.  This snack helps keeps your blood sugar at a safe range. continue current treatment plan  follow up with PMD within 1 week of discharge

## 2018-07-23 NOTE — PROGRESS NOTE ADULT - SUBJECTIVE AND OBJECTIVE BOX
Pt seen and examined  AVSS NAD    Lumbar incision: CDI with nylons.  No erythema, no drainage, no open areas    A/P: Appreciate plan for DC home  pt may shower once home; face front of body to water for 1 week, then normal shower  follow up with Dr. Moreno in 2 wks for suture removal call to make appt [phone]

## 2018-07-23 NOTE — DISCHARGE NOTE ADULT - CARE PROVIDER_API CALL
Huang Moreno), Plastic Surgery  [address], [place] [id number]  Phone: [phone]  Fax: [phone]    Dr. Jonelle  Phone: (   )    -  Fax: (   )    -

## 2018-07-23 NOTE — PROGRESS NOTE ADULT - ASSESSMENT
47 y/o male w/ hx dm/ htn / obesity with L3-L5 HNP with radicular symptoms  s/p surgery   postop care per Ortho  fsg riss  analgesics prn  hep sq  htn - stable  fsg riss  pt  pt f/u  ortho f/u   constipation s/p lactulose  d/c planning in am   home vs rehab
47 y/o male w/ hx dm/ htn / obesity with L3-L5 HNP with radicular symptoms  s/p surgery   postop care per Ortho  fsg riss  morphine for pain  venodyne boots  hep sq  htn - stable  fsg riss  pt  pt f/u  ortho f/u   constipation s/p lactulose  rehab planning   drain d/sharon   d/c abs
49 y/o male with past medical history DM2, HTN, HLD, Multiple lumbar disc herniations (L3-L4/L4-L5/L5-S1) s/p laminectomy/discectomy.    CV status stable, no cp/sob   No clinical evidence of CHF, angina or arrhythmia  No ischemic changes on ECG  BP stable off medication, continue statin   s/p L3-L5 laminectomy, discectomy - ortho/neurosurg f/u     dvt ppx
47 y/o male w/ hx dm/ htn / obesity with L3-L5 HNP with radicular symptoms  s/p surgery   postop care per Ortho  fsg riss  morphine for pain  venodyne boots  hep sq  htn - stable  fsg riss  pt  will check r hip xray still with pain  pt f/u  ortho f/u
47 y/o male w/ hx dm/ htn / obesity with L3-L5 HNP with radicular symptoms  s/p surgery   postop care per Ortho  fsg riss  morphine for pain  venodyne boots  htn - stable  fsg riss  pt  likely rehab will be needed
47 y/o male with past medical history DM2, HTN, HLD, Multiple lumbar disc herniations (L3-L4/L4-L5/L5-S1) awaiting laminectomy/discectomy.    CV status appears stable  No clinical evidence of CHF, angina or arrhythmia  No ischemic changes on ECG  BP stable off medication, continue statin   s/p L3-L5 laminectomy, discectomy    dvt ppx
47 y/o male with past medical history DM2, HTN, HLD, Multiple lumbar disc herniations (L3-L4/L4-L5/L5-S1) s/p laminectomy/discectomy.    1. chest pain , resolved   High sensitivity trop negative, No acute ischemia present   pt. refusing further cardiac workup at this time. will f/u in office.   No clinical evidence of CHF, angina or arrhythmia  BP stable off medication, continue statin     2. s/p L3-L5 laminectomy, discectomy   ortho/neurosurg f/u     dvt ppx   d/c planning per primary team- f/u outpatient in office
47 y/o male with past medical history DM2, HTN, HLD, Multiple lumbar disc herniations (L3-L4/L4-L5/L5-S1) s/p laminectomy/discectomy.    1. chest pain , resolved   High sensitivity trop negative, No acute ischemia present   pt. refusing further cardiac workup at this time. will f/u in office.   remains stable with no evidence of  CHF or angina   BP stable off medication, continue statin     2. s/p L3-L5 laminectomy, discectomy   ortho/neurosurg f/u     dvt ppx   d/c planning per primary team- f/u outpatient in office
47 y/o male with past medical history DM2, HTN, HLD, Multiple lumbar disc herniations (L3-L4/L4-L5/L5-S1) s/p laminectomy/discectomy.    CV status stable, no cp/sob   No clinical evidence of CHF, angina or arrhythmia  No ischemic changes on ECG  BP stable off medication, continue statin   s/p L3-L5 laminectomy, discectomy - ortho/neurosurg f/u     dvt ppx
47 y/o male with past medical history DM2, HTN, HLD, Multiple lumbar disc herniations (L3-L4/L4-L5/L5-S1) s/p laminectomy/discectomy.    No further C/p noted   High sensitivity trop negative, No acute ischemia present   pt. refusing further cardiac workup at this time. will f/u in office.   No clinical evidence of CHF, angina or arrhythmia  BP stable off medication, continue statin   s/p L3-L5 laminectomy, discectomy - ortho/neurosurg f/u     dvt ppx   d/c planning per primary team- f/u outpatient in office
49 y/o male w/ hx dm/ htn / obesity with L3-L5 HNP with radicular symptoms  s/p surgery   postop care per Ortho  fsg riss  analgesics prn  hep sq  htn - stable  fsg riss  pt  pt f/u  ortho f/u as out  wound care   constipation s/p lactulose  d/c planning today home vs rehab
49 y/o male w/ hx dm/ htn / obesity with L3-L5 HNP with radicular symptoms  s/p surgery   postop care per Ortho  fsg riss  morphine for pain  venodyne boots  hep sq  htn - stable  fsg riss  pt  pt f/u  ortho f/u   constipation s/p lactulose  rehab planning when drain out
49 y/o male w/ hx dm/ htn / obesity with L3-L5 HNP with radicular symptoms  s/p surgery   postop care per Ortho  fsg riss  morphine for pain  venodyne boots  hep sq  htn - stable  fsg riss  pt  pt f/u  ortho f/u   constipation/ lactulose x 1  rehab planning
49 y/o male w/ hx dm/ htn / obesity with L3-L5 HNP with radicular symptoms  s/p surgery yesterday  postop care per Ortho  IVF while NPO   fsg riss  morphine for pain  venodyne boots  htn - BP at goal
49 y/o male with past medical history DM2, HTN, HLD, Multiple lumbar disc herniations (L3-L4/L4-L5/L5-S1) s/p laminectomy/discectomy.    1. chest pain , resolved   High sensitivity trop negative, No acute ischemia present   pt. refusing further cardiac workup at this time. will f/u in office.   No clinical evidence of CHF, angina or arrhythmia  BP stable off medication, continue statin     2. s/p L3-L5 laminectomy, discectomy   ortho/neurosurg f/u     dvt ppx   d/c planning per primary team- f/u outpatient in office
49 y/o male with past medical history DM2, HTN, HLD, Multiple lumbar disc herniations (L3-L4/L4-L5/L5-S1) s/p laminectomy/discectomy.    Pt. c/o of chest pressure over night with exertion relieved with rest  High sensitivity trop negative, EKG from last night and this am shows no change from admission EKG. No acute ischemia present   pt. refusing further cardiac workup at this time. will f/u in office.   No clinical evidence of CHF, angina or arrhythmia  BP stable off medication, continue statin   s/p L3-L5 laminectomy, discectomy - ortho/neurosurg f/u     dvt ppx   d/c planning to rehab - f/u outpatient in office
A/p: 48yMale s/p L3-L5 lami/disc with PRS/NSx assistance.  VSS. NAD.
A/p: 48yMale s/p L3-L5 lami/disc.  VSS. NAD.
S/P  L3-5 Lami/disectomy    Plan    Drains as per plastics  OOB/PT/WBAT           Janie Monroy PA-C   Beeper    [id number]
S/P L3-5 Lami/discectomy      Plan    Drains as per plastics  OOB  orthopedically stable for d/c     Janie Monroy PA-C   Beeper    [id number]

## 2018-07-23 NOTE — DISCHARGE NOTE ADULT - MEDICATION SUMMARY - MEDICATIONS TO STOP TAKING
I will STOP taking the medications listed below when I get home from the hospital:    atorvastatin 20 mg oral tablet  -- 1 tab(s) by mouth once a day I will STOP taking the medications listed below when I get home from the hospital:    atorvastatin 20 mg oral tablet  -- 1 tab(s) by mouth once a day    heparin  -- 5000 unit(s) subcutaneous every 8 hours

## 2018-07-23 NOTE — DISCHARGE NOTE ADULT - NS AS DC STROKE ED MATERIALS
Need for Followup After Discharge/Risk Factors for Stroke/Stroke Warning Signs and Symptoms/Call 911 for Stroke/Prescribed Medications/Stroke Education Booklet

## 2018-07-23 NOTE — DISCHARGE NOTE ADULT - MEDICATION SUMMARY - MEDICATIONS TO TAKE
I will START or STAY ON the medications listed below when I get home from the hospital:    Rolling Walker  -- Use as directed.  Dx: Thoracic , thoracolumbar, and lumbosacral intervertebral disc disorder, s/p laminectomy. ICD 10: M51.9, Z98.890   -- Indication: For S/P laminectomy    oxyCODONE 15 mg oral tablet  -- 1 tab(s) by mouth every 4 hours, As needed, moderate and severe pain MDD:6  -- Indication: For S/P laminectomy    nabumetone 500 mg oral tablet  -- 2 tab(s) by mouth once a day  -- Indication: For Thoracic, thoracolumbar and lumbosacral intervertebral disc disorder    acetaminophen 325 mg oral tablet  -- 2 tab(s) by mouth every 8 hours  -- Indication: For Thoracic, thoracolumbar and lumbosacral intervertebral disc disorder    heparin  -- 5000 unit(s) subcutaneous every 8 hours  -- Indication: For Thoracic, thoracolumbar and lumbosacral intervertebral disc disorder    Lyrica 50 mg oral capsule  -- 1 cap(s) by mouth once a day (at bedtime)  -- Indication: For Thoracic, thoracolumbar and lumbosacral intervertebral disc disorder    pregabalin 50 mg oral capsule  -- 1 cap(s) by mouth 3 times a day  -- Indication: For Thoracic, thoracolumbar and lumbosacral intervertebral disc disorder    glipiZIDE 5 mg oral tablet  -- 1 tab(s) by mouth once a day   -- Avoid prolonged or excessive exposure to direct and/or artificial sunlight while taking this medication.  Do not drink alcoholic beverages when taking this medication.  It is very important that you take or use this exactly as directed.  Do not skip doses or discontinue unless directed by your doctor.  Take this medicine 30 minutes before a meal. Read label carefully for how many times to take each day.    -- Indication: For diabetes    simvastatin 20 mg oral tablet  -- 1 tab(s) by mouth once a day (at bedtime)  -- Indication: For hld    lidocaine 5% topical film  -- Apply on skin to affected area once a day  -- Indication: For Thoracic, thoracolumbar and lumbosacral intervertebral disc disorder    famotidine 20 mg oral tablet  -- 1 tab(s) by mouth every 12 hours, As needed, Dyspepsia  -- Indication: For S/P laminectomy    senna oral tablet  -- 2 tab(s) by mouth once a day (at bedtime)  -- Indication: For constipation    docusate sodium 100 mg oral capsule  -- 1 cap(s) by mouth 3 times a day  -- Indication: For constipation    polyethylene glycol 3350 oral powder for reconstitution  -- 17 gram(s) by mouth once a day  -- Indication: For constipation    cyclobenzaprine 10 mg oral tablet  -- 1 tab(s) by mouth 3 times a day, As needed, Muscle Spasm  -- Indication: For Thoracic, thoracolumbar and lumbosacral intervertebral disc disorder    calcium-vitamin D 500 mg-200 intl units oral tablet  -- 1 tab(s) by mouth 3 times a day  -- Indication: For Supplement I will START or STAY ON the medications listed below when I get home from the hospital:    Rolling Walker  -- Use as directed.  Dx: Thoracic , thoracolumbar, and lumbosacral intervertebral disc disorder, s/p laminectomy. ICD 10: M51.9, Z98.890   -- Indication: For S/P laminectomy    oxyCODONE 15 mg oral tablet  -- 1 tab(s) by mouth every 4 hours, As needed, moderate and severe pain MDD:6  -- Indication: For S/P laminectomy    nabumetone 500 mg oral tablet  -- 2 tab(s) by mouth once a day  -- Indication: For Thoracic, thoracolumbar and lumbosacral intervertebral disc disorder    acetaminophen 325 mg oral tablet  -- 2 tab(s) by mouth every 8 hours  -- Indication: For Thoracic, thoracolumbar and lumbosacral intervertebral disc disorder    heparin  -- 5000 unit(s) subcutaneous every 8 hours  -- Indication: For Thoracic, thoracolumbar and lumbosacral intervertebral disc disorder    Lyrica 50 mg oral capsule  -- 1 cap(s) by mouth once a day (at bedtime)  -- Indication: For Thoracic, thoracolumbar and lumbosacral intervertebral disc disorder    pregabalin 50 mg oral capsule  -- 1 cap(s) by mouth 3 times a day  -- Indication: For Thoracic, thoracolumbar and lumbosacral intervertebral disc disorder    glipiZIDE 5 mg oral tablet  -- 1 tab(s) by mouth once a day   -- Avoid prolonged or excessive exposure to direct and/or artificial sunlight while taking this medication.  Do not drink alcoholic beverages when taking this medication.  It is very important that you take or use this exactly as directed.  Do not skip doses or discontinue unless directed by your doctor.  Take this medicine 30 minutes before a meal. Read label carefully for how many times to take each day.    -- Indication: For Diabetes mellitus    simvastatin 20 mg oral tablet  -- 1 tab(s) by mouth once a day (at bedtime)  -- Indication: For Hypercholesteremia    lidocaine 5% topical film  -- Apply on skin to affected area once a day  -- Indication: For Thoracic, thoracolumbar and lumbosacral intervertebral disc disorder    famotidine 20 mg oral tablet  -- 1 tab(s) by mouth every 12 hours, As needed, Dyspepsia  -- Indication: For S/P laminectomy    senna oral tablet  -- 2 tab(s) by mouth once a day (at bedtime)  -- Indication: For constipation    docusate sodium 100 mg oral capsule  -- 1 cap(s) by mouth 3 times a day  -- Indication: For constipaton    polyethylene glycol 3350 oral powder for reconstitution  -- 17 gram(s) by mouth once a day  -- Indication: For constipation    cyclobenzaprine 10 mg oral tablet  -- 1 tab(s) by mouth 3 times a day, As needed, Muscle Spasm  -- Indication: For Thoracic, thoracolumbar and lumbosacral intervertebral disc disorder    calcium-vitamin D 500 mg-200 intl units oral tablet  -- 1 tab(s) by mouth 3 times a day  -- Indication: For Supplement I will START or STAY ON the medications listed below when I get home from the hospital:    Rolling Walker  -- Use as directed.  Dx: Thoracic , thoracolumbar, and lumbosacral intervertebral disc disorder, s/p laminectomy. ICD 10: M51.9, Z98.890   -- Indication: For S/P laminectomy    oxyCODONE 15 mg oral tablet  -- 1 tab(s) by mouth every 4 hours, As needed, moderate and severe pain MDD:6  -- Indication: For Thoracic, thoracolumbar and lumbosacral intervertebral disc disorder    nabumetone 500 mg oral tablet  -- 2 tab(s) by mouth once a day  -- Indication: For Thoracic, thoracolumbar and lumbosacral intervertebral disc disorder    acetaminophen 325 mg oral tablet  -- 2 tab(s) by mouth every 8 hours  -- Indication: For Thoracic, thoracolumbar and lumbosacral intervertebral disc disorder    heparin  -- 5000 unit(s) subcutaneous every 8 hours  -- Indication: For Thoracic, thoracolumbar and lumbosacral intervertebral disc disorder    Lyrica 50 mg oral capsule  -- 1 cap(s) by mouth once a day (at bedtime)  -- Indication: For Thoracic, thoracolumbar and lumbosacral intervertebral disc disorder    pregabalin 50 mg oral capsule  -- 1 cap(s) by mouth 3 times a day  -- Indication: For Thoracic, thoracolumbar and lumbosacral intervertebral disc disorder    glipiZIDE 5 mg oral tablet  -- 1 tab(s) by mouth once a day   -- Avoid prolonged or excessive exposure to direct and/or artificial sunlight while taking this medication.  Do not drink alcoholic beverages when taking this medication.  It is very important that you take or use this exactly as directed.  Do not skip doses or discontinue unless directed by your doctor.  Take this medicine 30 minutes before a meal. Read label carefully for how many times to take each day.    -- Indication: For Diabetes mellitus    simvastatin 20 mg oral tablet  -- 1 tab(s) by mouth once a day (at bedtime)  -- Indication: For Hypercholesteremia    lidocaine 5% topical film  -- Apply on skin to affected area once a day  -- Indication: For Thoracic, thoracolumbar and lumbosacral intervertebral disc disorder    famotidine 20 mg oral tablet  -- 1 tab(s) by mouth every 12 hours, As needed, Dyspepsia  -- Indication: For S/P laminectomy    senna oral tablet  -- 2 tab(s) by mouth once a day (at bedtime)  -- Indication: For constipation    docusate sodium 100 mg oral capsule  -- 1 cap(s) by mouth 3 times a day  -- Indication: For constipaton    polyethylene glycol 3350 oral powder for reconstitution  -- 17 gram(s) by mouth once a day  -- Indication: For constipation    cyclobenzaprine 10 mg oral tablet  -- 1 tab(s) by mouth 3 times a day, As needed, Muscle Spasm  -- Indication: For Thoracic, thoracolumbar and lumbosacral intervertebral disc disorder    calcium-vitamin D 500 mg-200 intl units oral tablet  -- 1 tab(s) by mouth 3 times a day  -- Indication: For Supplement I will START or STAY ON the medications listed below when I get home from the hospital:    Rolling Walker  -- Use as directed.  Dx: Thoracic , thoracolumbar, and lumbosacral intervertebral disc disorder, s/p laminectomy. ICD 10: M51.9, Z98.890   -- Indication: For S/P laminectomy    oxyCODONE 15 mg oral tablet  -- 1 tab(s) by mouth every 4 hours, As needed, moderate and severe pain MDD:6  -- Indication: For Thoracic, thoracolumbar and lumbosacral intervertebral disc disorder    nabumetone 500 mg oral tablet  -- 2 tab(s) by mouth once a day  -- Indication: For Thoracic, thoracolumbar and lumbosacral intervertebral disc disorder    acetaminophen 325 mg oral tablet  -- 2 tab(s) by mouth every 8 hours  -- Indication: For Thoracic, thoracolumbar and lumbosacral intervertebral disc disorder    pregabalin 50 mg oral capsule  -- 1 cap(s) by mouth 3 times a day  -- Indication: For Thoracic, thoracolumbar and lumbosacral intervertebral disc disorder    Lyrica 50 mg oral capsule  -- 1 cap(s) by mouth once a day (at bedtime)  -- Indication: For Thoracic, thoracolumbar and lumbosacral intervertebral disc disorder    glipiZIDE 5 mg oral tablet  -- 1 tab(s) by mouth once a day   -- Avoid prolonged or excessive exposure to direct and/or artificial sunlight while taking this medication.  Do not drink alcoholic beverages when taking this medication.  It is very important that you take or use this exactly as directed.  Do not skip doses or discontinue unless directed by your doctor.  Take this medicine 30 minutes before a meal. Read label carefully for how many times to take each day.    -- Indication: For Diabetes mellitus    simvastatin 20 mg oral tablet  -- 1 tab(s) by mouth once a day (at bedtime)  -- Indication: For Hypercholesteremia    lidocaine 5% topical film  -- Apply on skin to affected area once a day  -- Indication: For Thoracic, thoracolumbar and lumbosacral intervertebral disc disorder    famotidine 20 mg oral tablet  -- 1 tab(s) by mouth every 12 hours, As needed, Dyspepsia  -- Indication: For S/P laminectomy    senna oral tablet  -- 2 tab(s) by mouth once a day (at bedtime)  -- Indication: For constipation    docusate sodium 100 mg oral capsule  -- 1 cap(s) by mouth 3 times a day  -- Indication: For constipaton    polyethylene glycol 3350 oral powder for reconstitution  -- 17 gram(s) by mouth once a day  -- Indication: For constipation    cyclobenzaprine 10 mg oral tablet  -- 1 tab(s) by mouth 3 times a day, As needed, Muscle Spasm  -- Indication: For Thoracic, thoracolumbar and lumbosacral intervertebral disc disorder    calcium-vitamin D 500 mg-200 intl units oral tablet  -- 1 tab(s) by mouth 3 times a day  -- Indication: For Supplement

## 2018-07-23 NOTE — DISCHARGE NOTE ADULT - PATIENT PORTAL LINK FT
You can access the [facility] Patient Portal, offered by [facility], by registering with the following website: [email or web address]

## 2018-07-23 NOTE — PROGRESS NOTE ADULT - PROVIDER SPECIALTY LIST ADULT
Cardiology
Internal Medicine
Neurosurgery
Orthopedics
Plastic Surgery
Orthopedics
Plastic Surgery
Cardiology
Internal Medicine
Internal Medicine

## 2018-11-29 NOTE — PROGRESS NOTE ADULT - PROBLEM SELECTOR PLAN 4
- heparin for now; discontinue when patient adequately ambulatory Alert and oriented, no focal deficits, no motor or sensory deficits.

## 2019-09-09 NOTE — PATIENT PROFILE ADULT. - PRO MENTAL HEALTH SX RECENT
BIBA with c/o SOA from a half way house per EMS. Per EMS pt has a history of substance abuse and anxiety. On arrival pt alert and oriented x4. oxgen sat 97 percent on RA. Pt placed on 2 liter NC for comfort on arrival.    none

## 2020-07-23 ENCOUNTER — NON-APPOINTMENT (OUTPATIENT)
Age: 50
End: 2020-07-23

## 2020-07-23 ENCOUNTER — APPOINTMENT (OUTPATIENT)
Dept: CARDIOLOGY | Facility: CLINIC | Age: 50
End: 2020-07-23
Payer: COMMERCIAL

## 2020-07-23 ENCOUNTER — APPOINTMENT (OUTPATIENT)
Dept: PULMONOLOGY | Facility: CLINIC | Age: 50
End: 2020-07-23
Payer: COMMERCIAL

## 2020-07-23 VITALS
OXYGEN SATURATION: 95 % | BODY MASS INDEX: 36.58 KG/M2 | HEIGHT: 69 IN | TEMPERATURE: 98.3 F | DIASTOLIC BLOOD PRESSURE: 70 MMHG | WEIGHT: 247 LBS | SYSTOLIC BLOOD PRESSURE: 140 MMHG | HEART RATE: 106 BPM

## 2020-07-23 DIAGNOSIS — L02.91 CUTANEOUS ABSCESS, UNSPECIFIED: ICD-10-CM

## 2020-07-23 DIAGNOSIS — Z00.00 ENCOUNTER FOR GENERAL ADULT MEDICAL EXAMINATION W/OUT ABNORMAL FINDINGS: ICD-10-CM

## 2020-07-23 PROCEDURE — 71046 X-RAY EXAM CHEST 2 VIEWS: CPT

## 2020-07-23 PROCEDURE — 93306 TTE W/DOPPLER COMPLETE: CPT

## 2020-07-23 PROCEDURE — 93000 ELECTROCARDIOGRAM COMPLETE: CPT

## 2020-07-23 PROCEDURE — 99204 OFFICE O/P NEW MOD 45 MIN: CPT | Mod: 25

## 2020-07-23 RX ORDER — CHROMIUM 200 MCG
1000 TABLET ORAL
Refills: 0 | Status: ACTIVE | COMMUNITY

## 2020-07-23 RX ORDER — ATORVASTATIN CALCIUM 20 MG/1
20 TABLET, FILM COATED ORAL
Refills: 0 | Status: DISCONTINUED | COMMUNITY
End: 2020-07-23

## 2020-07-23 RX ORDER — INSULIN LISPRO 100 [IU]/ML
INJECTION, SOLUTION INTRAVENOUS; SUBCUTANEOUS
Refills: 0 | Status: DISCONTINUED | COMMUNITY
End: 2020-07-23

## 2020-07-23 RX ORDER — INSULIN GLARGINE 100 [IU]/ML
INJECTION, SOLUTION SUBCUTANEOUS
Refills: 0 | Status: DISCONTINUED | COMMUNITY
End: 2020-07-23

## 2020-07-23 NOTE — HISTORY OF PRESENT ILLNESS
[FreeTextEntry1] : Patient is here to establish care and for hospital follow-up. [de-identified] : Adis is a 51yo male who presents the office today to establish care and for hospital follow-up. Patient was at [facility] from 4/9/20-5/24/20 for COVID 19 virus. History obtained from patient as hospital records are not available to review. Patient reports he was intubated while [facility] and since discharge he has been experiencing BL hand/wrist pain from being bedbound, he has difficulty flexing his left hand and uses a wrist brace. He is currently doing PT 3x week. Patient also reporting that he received Plaquenil while in hospital but developed ,methemoglobinemia. He also reports he went into Afib with RVR while at [facility] and was started on Eliquis which he is currently taking. Patient reports his main concerns for today are his BL hand pain and voice hoarseness. Pt also endorsing palpitations, denies CP or dyspnea.

## 2020-07-23 NOTE — PHYSICAL EXAM
[No Acute Distress] : no acute distress [Well Nourished] : well nourished [Normal Sclera/Conjunctiva] : normal sclera/conjunctiva [Well-Appearing] : well-appearing [Well Developed] : well developed [Normal Outer Ear/Nose] : the outer ears and nose were normal in appearance [PERRL] : pupils equal round and reactive to light [EOMI] : extraocular movements intact [Normal Oropharynx] : the oropharynx was normal [No JVD] : no jugular venous distention [No Lymphadenopathy] : no lymphadenopathy [Thyroid Normal, No Nodules] : the thyroid was normal and there were no nodules present [No Respiratory Distress] : no respiratory distress  [Supple] : supple [Clear to Auscultation] : lungs were clear to auscultation bilaterally [No Accessory Muscle Use] : no accessory muscle use [Normal Rate] : normal rate  [Regular Rhythm] : with a regular rhythm [Normal S1, S2] : normal S1 and S2 [No Varicosities] : no varicosities [No Murmur] : no murmur heard [No Abdominal Bruit] : a ~M bruit was not heard ~T in the abdomen [No Carotid Bruits] : no carotid bruits [No Edema] : there was no peripheral edema [Pedal Pulses Present] : the pedal pulses are present [Non Tender] : non-tender [No Extremity Clubbing/Cyanosis] : no extremity clubbing/cyanosis [No Palpable Aorta] : no palpable aorta [Soft] : abdomen soft [Non-distended] : non-distended [No HSM] : no HSM [No Masses] : no abdominal mass palpated [Normal Bowel Sounds] : normal bowel sounds [Normal Posterior Cervical Nodes] : no posterior cervical lymphadenopathy [Normal Anterior Cervical Nodes] : no anterior cervical lymphadenopathy [No Spinal Tenderness] : no spinal tenderness [No Joint Swelling] : no joint swelling [No CVA Tenderness] : no CVA  tenderness [No Rash] : no rash [Coordination Grossly Intact] : coordination grossly intact [Grossly Normal Strength/Tone] : grossly normal strength/tone [Deep Tendon Reflexes (DTR)] : deep tendon reflexes were 2+ and symmetric [Normal Gait] : normal gait [No Focal Deficits] : no focal deficits [de-identified] : bilateral arm weakness [Normal Insight/Judgement] : insight and judgment were intact [Normal Affect] : the affect was normal [de-identified] : lright heel bandaged

## 2020-07-25 LAB
25(OH)D3 SERPL-MCNC: 23.1 NG/ML
ALBUMIN SERPL ELPH-MCNC: 4.5 G/DL
ALP BLD-CCNC: 123 U/L
ALT SERPL-CCNC: 8 U/L
ANION GAP SERPL CALC-SCNC: 18 MMOL/L
APPEARANCE: CLEAR
AST SERPL-CCNC: 11 U/L
BACTERIA: NEGATIVE
BASOPHILS # BLD AUTO: 0.05 K/UL
BASOPHILS NFR BLD AUTO: 0.7 %
BILIRUB SERPL-MCNC: 0.2 MG/DL
BILIRUBIN URINE: NEGATIVE
BLOOD URINE: NEGATIVE
BUN SERPL-MCNC: 8 MG/DL
CALCIUM SERPL-MCNC: 9.8 MG/DL
CHLORIDE SERPL-SCNC: 106 MMOL/L
CHOLEST SERPL-MCNC: 326 MG/DL
CHOLEST/HDLC SERPL: 7.1 RATIO
CO2 SERPL-SCNC: 22 MMOL/L
COLOR: YELLOW
CREAT SERPL-MCNC: 1.24 MG/DL
EOSINOPHIL # BLD AUTO: 0.08 K/UL
EOSINOPHIL NFR BLD AUTO: 1.2 %
ESTIMATED AVERAGE GLUCOSE: 100 MG/DL
GLUCOSE QUALITATIVE U: NEGATIVE
GLUCOSE SERPL-MCNC: 119 MG/DL
HBA1C MFR BLD HPLC: 5.1 %
HCT VFR BLD CALC: 40.1 %
HDLC SERPL-MCNC: 46 MG/DL
HGB BLD-MCNC: 11.9 G/DL
HYALINE CASTS: 0 /LPF
IMM GRANULOCYTES NFR BLD AUTO: 0.3 %
KETONES URINE: NEGATIVE
LDLC SERPL CALC-MCNC: 234 MG/DL
LEUKOCYTE ESTERASE URINE: ABNORMAL
LYMPHOCYTES # BLD AUTO: 2.06 K/UL
LYMPHOCYTES NFR BLD AUTO: 30.2 %
MAN DIFF?: NORMAL
MCHC RBC-ENTMCNC: 26.9 PG
MCHC RBC-ENTMCNC: 29.7 GM/DL
MCV RBC AUTO: 90.5 FL
MICROSCOPIC-UA: NORMAL
MONOCYTES # BLD AUTO: 0.71 K/UL
MONOCYTES NFR BLD AUTO: 10.4 %
NEUTROPHILS # BLD AUTO: 3.9 K/UL
NEUTROPHILS NFR BLD AUTO: 57.2 %
NITRITE URINE: NEGATIVE
PH URINE: 6
PLATELET # BLD AUTO: 318 K/UL
POTASSIUM SERPL-SCNC: 3.8 MMOL/L
PROT SERPL-MCNC: 7.4 G/DL
PROTEIN URINE: ABNORMAL
PSA SERPL-MCNC: 0.63 NG/ML
RBC # BLD: 4.43 M/UL
RBC # FLD: 14.1 %
RED BLOOD CELLS URINE: 2 /HPF
SARS-COV-2 IGG SERPL IA-ACNC: 156 AU/ML
SARS-COV-2 IGG SERPL QL IA: POSITIVE
SODIUM SERPL-SCNC: 146 MMOL/L
SPECIFIC GRAVITY URINE: 1.02
SQUAMOUS EPITHELIAL CELLS: 2 /HPF
T4 FREE SERPL-MCNC: 1.1 NG/DL
TRIGL SERPL-MCNC: 230 MG/DL
TSH SERPL-ACNC: 2.26 UIU/ML
UROBILINOGEN URINE: NORMAL
WBC # FLD AUTO: 6.82 K/UL
WHITE BLOOD CELLS URINE: 12 /HPF

## 2020-07-29 NOTE — ED PROVIDER NOTE - CROS ED RESP ALL NEG
,DirectAddress_Unknown,[email or web address] - - -

## 2020-08-20 DIAGNOSIS — Z01.818 ENCOUNTER FOR OTHER PREPROCEDURAL EXAMINATION: ICD-10-CM

## 2020-08-21 ENCOUNTER — APPOINTMENT (OUTPATIENT)
Dept: DISASTER EMERGENCY | Facility: CLINIC | Age: 50
End: 2020-08-21

## 2020-08-22 LAB — SARS-COV-2 N GENE NPH QL NAA+PROBE: NOT DETECTED

## 2020-08-23 LAB
ALP BLD-CCNC: 97 U/L
ANION GAP SERPL CALC-SCNC: 15 MMOL/L
APPEARANCE: CLEAR
BACTERIA UR CULT: NORMAL
BACTERIA: NEGATIVE
BASOPHILS # BLD AUTO: 0.05 K/UL
BASOPHILS NFR BLD AUTO: 0.8 %
BILIRUBIN URINE: NEGATIVE
BLOOD URINE: NEGATIVE
BUN SERPL-MCNC: 14 MG/DL
CALCIUM SERPL-MCNC: 9.8 MG/DL
CHLORIDE SERPL-SCNC: 104 MMOL/L
CO2 SERPL-SCNC: 24 MMOL/L
COLOR: YELLOW
CREAT SERPL-MCNC: 1.3 MG/DL
EOSINOPHIL # BLD AUTO: 0.13 K/UL
EOSINOPHIL NFR BLD AUTO: 2 %
FERRITIN SERPL-MCNC: 949 NG/ML
FOLATE SERPL-MCNC: >20 NG/ML
GGT SERPL-CCNC: 37 U/L
GLUCOSE QUALITATIVE U: NEGATIVE
GLUCOSE SERPL-MCNC: 107 MG/DL
HCT VFR BLD CALC: 41.2 %
HGB BLD-MCNC: 12.5 G/DL
HYALINE CASTS: 4 /LPF
IMM GRANULOCYTES NFR BLD AUTO: 0.2 %
IRON SATN MFR SERPL: 14 %
IRON SERPL-MCNC: 37 UG/DL
KETONES URINE: NORMAL
LEUKOCYTE ESTERASE URINE: ABNORMAL
LYMPHOCYTES # BLD AUTO: 1.9 K/UL
LYMPHOCYTES NFR BLD AUTO: 29.8 %
MAN DIFF?: NORMAL
MCHC RBC-ENTMCNC: 27.3 PG
MCHC RBC-ENTMCNC: 30.3 GM/DL
MCV RBC AUTO: 90 FL
MICROSCOPIC-UA: NORMAL
MONOCYTES # BLD AUTO: 0.56 K/UL
MONOCYTES NFR BLD AUTO: 8.8 %
NEUTROPHILS # BLD AUTO: 3.72 K/UL
NEUTROPHILS NFR BLD AUTO: 58.4 %
NITRITE URINE: NEGATIVE
PH URINE: 6
PLATELET # BLD AUTO: 276 K/UL
POTASSIUM SERPL-SCNC: 3.9 MMOL/L
PROTEIN URINE: ABNORMAL
RBC # BLD: 4.58 M/UL
RBC # FLD: 14.4 %
RED BLOOD CELLS URINE: 4 /HPF
SODIUM SERPL-SCNC: 143 MMOL/L
SPECIFIC GRAVITY URINE: 1.03
SQUAMOUS EPITHELIAL CELLS: 1 /HPF
TIBC SERPL-MCNC: 256 UG/DL
UIBC SERPL-MCNC: 219 UG/DL
UROBILINOGEN URINE: NORMAL
VIT B12 SERPL-MCNC: 940 PG/ML
WBC # FLD AUTO: 6.37 K/UL
WHITE BLOOD CELLS URINE: 11 /HPF

## 2020-08-25 ENCOUNTER — APPOINTMENT (OUTPATIENT)
Dept: PULMONOLOGY | Facility: CLINIC | Age: 50
End: 2020-08-25
Payer: COMMERCIAL

## 2020-08-25 VITALS
HEIGHT: 69 IN | SYSTOLIC BLOOD PRESSURE: 142 MMHG | DIASTOLIC BLOOD PRESSURE: 95 MMHG | TEMPERATURE: 98.6 F | WEIGHT: 249 LBS | OXYGEN SATURATION: 95 % | HEART RATE: 98 BPM | RESPIRATION RATE: 16 BRPM | BODY MASS INDEX: 36.88 KG/M2

## 2020-08-25 DIAGNOSIS — U07.1 COVID-19: ICD-10-CM

## 2020-08-25 DIAGNOSIS — J12.82 COVID-19: ICD-10-CM

## 2020-08-25 PROCEDURE — 71046 X-RAY EXAM CHEST 2 VIEWS: CPT

## 2020-08-25 PROCEDURE — 94750: CPT

## 2020-08-25 PROCEDURE — 99244 OFF/OP CNSLTJ NEW/EST MOD 40: CPT | Mod: 25

## 2020-08-25 PROCEDURE — 94729 DIFFUSING CAPACITY: CPT

## 2020-08-25 PROCEDURE — 94726 PLETHYSMOGRAPHY LUNG VOLUMES: CPT

## 2020-08-25 PROCEDURE — 94010 BREATHING CAPACITY TEST: CPT

## 2020-08-25 NOTE — PROCEDURE
[FreeTextEntry1] : 08/25/2020\par Pulmonary function testing\par Normal Flow Rates There is a borderline mild reduction in lung volume. There is a mild diffusion impairment. Corrects to normal with lung volume correction Airway resistance is normal.

## 2020-08-25 NOTE — REVIEW OF SYSTEMS
[Negative] : Endocrine [TextBox_14] : As per history above [TextBox_30] : As per history above [TextBox_119] : As per history above

## 2020-08-25 NOTE — ASSESSMENT
[FreeTextEntry1] : Observation.\par Neurology follow-up.\par Patient to see vascular.\par Continue anticoagulation.\par Follow-up in 3 months or sooner on a as needed basis.

## 2020-08-25 NOTE — HISTORY OF PRESENT ILLNESS
[TextBox_4] : JACKSON LAL is a 50 year old  M referred for pulmonary evaluation for COVID Pneumonia\par \par Admitted 4/9 then transferred to [facility]. Discharged 7/1.\par On vent for 6 weeks. Had tracheostomy.\par Presently CC pain and weakness. Pain in arms and weakness in arms. Had clot and nerve damage.\par \par Voice improved.\par Saw ENT recc. injection told did not want.\par Told Right VC not moving.\par \par Resp status improved. Positive VASQUEZ.\par No significant cough, wheezing or CP.\par \par Past pulmonary history. N\par Occupational Exposure. [employer]\par Family history of pulmonary disease. N\par Recent travel N\par Pets N\par \par Denies PE\par

## 2020-08-25 NOTE — DISCUSSION/SUMMARY
[FreeTextEntry1] : Status post prolonged hospitalization related to COVID pneumonia.\par Status post mechanical ventilation and tracheostomy.\par Vocal cord dysfunction.  Followed by ENT.\par History of upper extremity clot.\par Neurologic injury to upper extremities.\par \par Respiratory status by history significantly improved.\par Presently without significant radiographic abnormality and lung function reveals minimal abnormality.

## 2020-08-25 NOTE — PHYSICAL EXAM
[No Acute Distress] : no acute distress [No Murmurs] : no murmurs [Supple] : supple [Normal S1, S2] : normal s1, s2 [Clear to Auscultation Bilaterally] : clear to auscultation bilaterally [No Abnormalities] : no abnormalities [Normal to Percussion] : normal to percussion [No Cyanosis] : no cyanosis [No Clubbing] : no clubbing [Benign] : benign [No Edema] : no edema [Oriented x3] : oriented x3 [TextBox_2] : Overweight

## 2020-09-18 ENCOUNTER — APPOINTMENT (OUTPATIENT)
Dept: CARDIOLOGY | Facility: CLINIC | Age: 50
End: 2020-09-18
Payer: COMMERCIAL

## 2020-09-18 ENCOUNTER — NON-APPOINTMENT (OUTPATIENT)
Age: 50
End: 2020-09-18

## 2020-09-18 PROCEDURE — 93224 XTRNL ECG REC UP TO 48 HRS: CPT

## 2020-09-24 ENCOUNTER — APPOINTMENT (OUTPATIENT)
Dept: VASCULAR SURGERY | Facility: CLINIC | Age: 50
End: 2020-09-24
Payer: COMMERCIAL

## 2020-09-24 VITALS — HEART RATE: 93 BPM | SYSTOLIC BLOOD PRESSURE: 138 MMHG | DIASTOLIC BLOOD PRESSURE: 98 MMHG | TEMPERATURE: 98.6 F

## 2020-09-24 VITALS
WEIGHT: 249 LBS | BODY MASS INDEX: 36.88 KG/M2 | HEIGHT: 69 IN | HEART RATE: 89 BPM | DIASTOLIC BLOOD PRESSURE: 111 MMHG | SYSTOLIC BLOOD PRESSURE: 150 MMHG

## 2020-09-24 PROCEDURE — 99203 OFFICE O/P NEW LOW 30 MIN: CPT

## 2020-09-25 ENCOUNTER — APPOINTMENT (OUTPATIENT)
Dept: WOUND CARE | Facility: CLINIC | Age: 50
End: 2020-09-25
Payer: COMMERCIAL

## 2020-09-25 PROCEDURE — 99203 OFFICE O/P NEW LOW 30 MIN: CPT | Mod: 25

## 2020-09-25 PROCEDURE — 11042 DBRDMT SUBQ TIS 1ST 20SQCM/<: CPT

## 2020-09-25 NOTE — PLAN
[FreeTextEntry1] : Rx X-rays right heel 3 views\par consider MRI with contrast for right heel probing wound\par no signs of cellulitis no signs of acute infection\par full thickness debridement of ulceration right heel\par continue with santyl collagenase daily wound care dressings\par no need for oral antibiosis since there is no signs of cellulitis but concerning for chronic osteomyelitis right heel\par rx multipodus boot to off load posterior right heel wound refer to [facility]\par stress importance of staying off of right foot and keeping pressure off of right heel\par

## 2020-09-25 NOTE — HISTORY OF PRESENT ILLNESS
[FreeTextEntry1] : Patient was hospitalized for April 9 , 2020 for covid at [facility] and discharged 7/1/20\par patietn was in bed and developed right heel wound Patient has been using santyl collagenase and daily dressing changes\par patient saw Dr. jalloh yesterday who debrided wound and referred to wound center\par granular wound base with minimal fibrotic tissue\par significant depth 2.0 cm to ulcer posterior right heel\par concerning for probing to bone and possible osteomyelitis\par patient using santyl collagenase right heel

## 2020-09-26 ENCOUNTER — OUTPATIENT (OUTPATIENT)
Dept: OUTPATIENT SERVICES | Facility: HOSPITAL | Age: 50
LOS: 1 days | End: 2020-09-26
Payer: COMMERCIAL

## 2020-09-26 ENCOUNTER — APPOINTMENT (OUTPATIENT)
Dept: RADIOLOGY | Facility: IMAGING CENTER | Age: 50
End: 2020-09-26
Payer: COMMERCIAL

## 2020-09-26 ENCOUNTER — RESULT REVIEW (OUTPATIENT)
Age: 50
End: 2020-09-26

## 2020-09-26 DIAGNOSIS — Z98.890 OTHER SPECIFIED POSTPROCEDURAL STATES: Chronic | ICD-10-CM

## 2020-09-26 DIAGNOSIS — Z00.8 ENCOUNTER FOR OTHER GENERAL EXAMINATION: ICD-10-CM

## 2020-09-26 PROCEDURE — 73630 X-RAY EXAM OF FOOT: CPT | Mod: 26,RT

## 2020-09-26 PROCEDURE — 73630 X-RAY EXAM OF FOOT: CPT

## 2020-10-02 ENCOUNTER — APPOINTMENT (OUTPATIENT)
Dept: WOUND CARE | Facility: CLINIC | Age: 50
End: 2020-10-02
Payer: COMMERCIAL

## 2020-10-02 VITALS — TEMPERATURE: 97.3 F | HEIGHT: 69 IN | BODY MASS INDEX: 36.88 KG/M2 | WEIGHT: 249 LBS

## 2020-10-02 PROCEDURE — 11042 DBRDMT SUBQ TIS 1ST 20SQCM/<: CPT

## 2020-10-02 NOTE — HISTORY OF PRESENT ILLNESS
[FreeTextEntry1] : Patient was hospitalized for April 9 , 2020 for covid at [facility] and discharged 7/1/20\par patietn was in bed and developed right heel wound Patient has been using santyl collagenase and daily dressing changes\par patient saw Dr. jalloh yesterday who debrided wound and referred to wound center\par granular wound base with minimal fibrotic tissue\par significant depth 2.0 cm to ulcer posterior right heel\par concerning for probing to bone and possible osteomyelitis\par patient using santyl collagenase right heel

## 2020-10-02 NOTE — PLAN
[FreeTextEntry1] : Rx X-rays right heel 3 views revealed no signs of any obvious osteomyelitis\par consider MRI with contrast for right heel but no signs of cellulitis or probing of wound to bone so we will hold off on getting MRI at this point\par no signs of cellulitis no signs of acute infection\par full thickness debridement of ulceration right heel\par continue with santyl collagenase daily wound care dressings\par no need for oral antibiosis since there is no signs of cellulitis but concerning for chronic osteomyelitis right heel\par rx multipodus boot to off load posterior right heel wound refer again to ed[facility] el[facility]\par +Diabetic pressure wound right posterior plantar heel\par Rx Heel relief shoe\par Patient lost his card and never got the off loading boot or heel relief shoe\par stress importance of staying off of right foot and keeping pressure off of right heel\par patient told that 50% of all patients with calcaneal osteomyelitis will need a below knee amputation so it is important to not wear sandals and properly off load right heel at all times of day and night\par

## 2020-10-09 ENCOUNTER — APPOINTMENT (OUTPATIENT)
Dept: WOUND CARE | Facility: CLINIC | Age: 50
End: 2020-10-09
Payer: COMMERCIAL

## 2020-10-09 VITALS — TEMPERATURE: 98.3 F

## 2020-10-09 PROCEDURE — 11042 DBRDMT SUBQ TIS 1ST 20SQCM/<: CPT

## 2020-10-09 NOTE — PLAN
[FreeTextEntry1] : Rx X-rays right heel 3 views revealed no signs of any obvious osteomyelitis\par consider MRI with contrast for right heel but no signs of cellulitis or probing of wound to bone so we will hold off on getting MRI at this point\par no signs of cellulitis no signs of acute infection\par full thickness debridement of ulceration right heel\par continue with santyl collagenase daily wound care dressings\par no need for oral antibiosis since there is no signs of cellulitis but concerning for chronic osteomyelitis right heel\par rx multipodus boot to off load posterior right heel wound refer again to [facility]\par +Diabetic pressure wound right posterior plantar heel\par Rx Heel relief shoe\par Patient still has not gotte the off loading boot or heel relief shoe\par stress importance of getting off loading boot to avoid bone infection of calcaneus\par stress importance of staying off of right foot and keeping pressure off of right heel\par patient told that 50% of all patients with calcaneal osteomyelitis will need a below knee amputation so it is important to not wear sandals and properly off load right heel at all times of day and night\par

## 2020-10-09 NOTE — HISTORY OF PRESENT ILLNESS
[FreeTextEntry1] : Patient was hospitalized for April 9 , 2020 for covid at [facility] and discharged 7/1/20\par patietn was in bed and developed right heel wound Patient has been using santyl collagenase and daily dressing changes\par patient saw Dr. jalloh last week who debrided wound and referred to wound center\par granular wound base with minimal fibrotic tissue\par significant depth 2.0 cm to ulcer posterior right heel\par concerning for probing to bone and possible osteomyelitis\par patient using santyl collagenase right heel

## 2020-10-09 NOTE — HISTORY OF PRESENT ILLNESS
[FreeTextEntry1] : Patient was hospitalized for April 9 , 2020 for covid at [facility] and discharged 7/1/20\par patietn was in bed and developed right heel wound Patient has been using santyl collagenase and daily dressing changes\par patient saw Dr. jalloh last week who debrided wound and referred to wound center\par granular wound base with minimal fibrotic tissue\par significant depth 2.0 cm to ulcer posterior right heel\par concerning for probing to bone and possible osteomyelitis\par patient using santyl collagenase right heel

## 2020-10-13 ENCOUNTER — APPOINTMENT (OUTPATIENT)
Dept: CARDIOLOGY | Facility: CLINIC | Age: 50
End: 2020-10-13
Payer: COMMERCIAL

## 2020-10-13 VITALS
WEIGHT: 260 LBS | TEMPERATURE: 98.6 F | HEART RATE: 74 BPM | HEIGHT: 69 IN | SYSTOLIC BLOOD PRESSURE: 136 MMHG | OXYGEN SATURATION: 99 % | DIASTOLIC BLOOD PRESSURE: 96 MMHG | BODY MASS INDEX: 38.51 KG/M2

## 2020-10-13 DIAGNOSIS — R49.0 DYSPHONIA: ICD-10-CM

## 2020-10-13 DIAGNOSIS — R74.8 ABNORMAL LEVELS OF OTHER SERUM ENZYMES: ICD-10-CM

## 2020-10-13 DIAGNOSIS — L03.115 CELLULITIS OF RIGHT LOWER LIMB: ICD-10-CM

## 2020-10-13 PROCEDURE — 99214 OFFICE O/P EST MOD 30 MIN: CPT

## 2020-10-13 NOTE — HISTORY OF PRESENT ILLNESS
[FreeTextEntry1] : This is a 50 year old gentlemen presents today for follow up. Patient states that he has been following with the wound center for right heel ulcer. Patient has started on increased Metoprolol 50 mg BID. He is still on Elliquis for DVT's and Afib. Patient states that he still has residual wrist pain and pain and weakness in his fingers. Patient has been following with Neurologist- Dr. Fountain. Patient states that his Voice Hoarseness has been improving, he had followed up with ENT. Patient states that he has an appointment with GI doctor- Dr. Brunner on 11/19/2020. Patient to see urologist next week (Dr. Mancia), he has not made appointment with hematologist. Patient denies dyspnea, palpitations, chest pain, nausea, vomiting, dizziness and lightheadedness.\par

## 2020-10-13 NOTE — PHYSICAL EXAM
[General Appearance - Well Developed] : well developed [Normal Appearance] : normal appearance [Well Groomed] : well groomed [General Appearance - Well Nourished] : well nourished [No Deformities] : no deformities [General Appearance - In No Acute Distress] : no acute distress [Normal Conjunctiva] : the conjunctiva exhibited no abnormalities [Eyelids - No Xanthelasma] : the eyelids demonstrated no xanthelasmas [Normal Oral Mucosa] : normal oral mucosa [No Oral Pallor] : no oral pallor [No Oral Cyanosis] : no oral cyanosis [Normal Jugular Venous A Waves Present] : normal jugular venous A waves present [Normal Jugular Venous V Waves Present] : normal jugular venous V waves present [No Jugular Venous Mendoza A Waves] : no jugular venous mendoza A waves [Respiration, Rhythm And Depth] : normal respiratory rhythm and effort [Exaggerated Use Of Accessory Muscles For Inspiration] : no accessory muscle use [Auscultation Breath Sounds / Voice Sounds] : lungs were clear to auscultation bilaterally [Heart Rate And Rhythm] : heart rate and rhythm were normal [Heart Sounds] : normal S1 and S2 [Murmurs] : no murmurs present [Abdomen Soft] : soft [Abdomen Tenderness] : non-tender [Abdomen Mass (___ Cm)] : no abdominal mass palpated [Abnormal Walk] : normal gait [Gait - Sufficient For Exercise Testing] : the gait was sufficient for exercise testing [Skin Color & Pigmentation] : normal skin color and pigmentation [] : no rash [No Venous Stasis] : no venous stasis [Skin Lesions] : no skin lesions [No Skin Ulcers] : no skin ulcer [No Xanthoma] : no  xanthoma was observed [Oriented To Time, Place, And Person] : oriented to person, place, and time [Affect] : the affect was normal [Mood] : the mood was normal [No Anxiety] : not feeling anxious [FreeTextEntry1] : Brace to the left hand and right foot

## 2020-10-15 NOTE — H&P ADULT - HISTORY OF PRESENT ILLNESS
[place]...
47 y/o male with past medical history DM2, HTN, HLD, Multiple lumbar disc herniations (L3-L4/L4-L5/L5-S1) presents to the ED with complaint of worsening lower back pain. Patient states he was walking  2 nights ago when he felt worsening lower back pain described as "achy and sharp" originating in the right lower back region with radiation down the  thigh, posterior leg, and dorsum of the foot.  Patient rated the pain 10/10 in severity. Denies fevers, chills, incontinence, dysuria. Notes that baseline right leg numbness, weakness and pain  worsened but distribution remained same (right buttock, thigh, posterior leg, dorsum of foot).

## 2020-10-16 ENCOUNTER — APPOINTMENT (OUTPATIENT)
Dept: WOUND CARE | Facility: CLINIC | Age: 50
End: 2020-10-16
Payer: COMMERCIAL

## 2020-10-16 VITALS
SYSTOLIC BLOOD PRESSURE: 138 MMHG | WEIGHT: 260 LBS | BODY MASS INDEX: 38.51 KG/M2 | TEMPERATURE: 97.1 F | HEIGHT: 69 IN | DIASTOLIC BLOOD PRESSURE: 90 MMHG | HEART RATE: 67 BPM

## 2020-10-16 PROCEDURE — 11042 DBRDMT SUBQ TIS 1ST 20SQCM/<: CPT

## 2020-10-16 NOTE — PLAN
[FreeTextEntry1] : Rx X-rays right heel 3 views revealed no signs of any obvious osteomyelitis\par consider MRI with contrast for right heel but no signs of cellulitis or probing of wound to bone so we will hold off on getting MRI at this point\par no signs of cellulitis no signs of acute infection\par full thickness debridement of ulceration right heel\par continue with santyl collagenase daily wound care dressings\par no need for oral antibiosis since there is no signs of cellulitis but concerning for chronic osteomyelitis right heel\par rx multipodus boot to off load posterior right heel wound refer again to [facility]\par +Diabetic pressure wound right posterior plantar heel\par Patient is using the off loading boot or heel relief shoe\par stress importance of getting off loading boot to avoid bone infection of calcaneus\par stress importance of staying off of right foot and keeping pressure off of right heel\par patient told that 50% of all patients with calcaneal osteomyelitis will need a below knee amputation so it is important to not wear sandals and properly off load right heel at all times of day and night\par

## 2020-10-16 NOTE — HISTORY OF PRESENT ILLNESS
[FreeTextEntry1] : Patient was hospitalized for April 9 , 2020 for covid at [facility] and discharged 7/1/20\par patietn was in bed and developed right heel wound Patient has been using santyl collagenase and daily dressing changes\par patient saw Dr. jalloh last week who debrided wound and referred to wound center\par granular wound base with minimal fibrotic tissue\par significant depth 2.0 cm to ulcer posterior right heel\par concerning for probing to bone and possible osteomyelitis\par patient using santyl collagenase right heel\par granular wound not probing to bone

## 2020-10-22 ENCOUNTER — APPOINTMENT (OUTPATIENT)
Dept: UROLOGY | Facility: CLINIC | Age: 50
End: 2020-10-22
Payer: COMMERCIAL

## 2020-10-22 VITALS
RESPIRATION RATE: 16 BRPM | TEMPERATURE: 97.8 F | DIASTOLIC BLOOD PRESSURE: 95 MMHG | SYSTOLIC BLOOD PRESSURE: 137 MMHG | HEART RATE: 74 BPM | OXYGEN SATURATION: 96 %

## 2020-10-22 PROCEDURE — 99203 OFFICE O/P NEW LOW 30 MIN: CPT

## 2020-10-22 PROCEDURE — 99072 ADDL SUPL MATRL&STAF TM PHE: CPT

## 2020-10-22 NOTE — HISTORY OF PRESENT ILLNESS
[FreeTextEntry1] : He is a 50 year old man who is seen today for initial visit with his daughter. He was hospitalized for a few months with COVID-19 and was in ICU. He has nocturia 2 times. Flow is sometimes slow. Residual urine today was 90 cc. Cr was 1.3 UA twice showed WBC but urine culture was negative. On July 2020, PSA was 0.6.  He still has loss of smell.

## 2020-10-22 NOTE — REVIEW OF SYSTEMS
[Eyesight Problems] : eyesight problems [Palpitations] : palpitations [Poor quality erections] : Poor quality erections [Wake up at night to urinate  How many times?  ___] : wakes up to urinate [unfilled] times during the night [Slow urine stream] : slow urine stream [Joint Pain] : joint pain [Joint Swelling] : joint swelling [Limb Swelling] : limb swelling [Skin Wound] : skin wound [Dizziness] : dizziness [Limb Weakness] : limb weakness [Muscle Weakness] : muscle weakness [Feelings Of Weakness] : feelings of weakness [Negative] : Heme/Lymph

## 2020-10-22 NOTE — PHYSICAL EXAM
[General Appearance - Well Developed] : well developed [General Appearance - Well Nourished] : well nourished [Normal Appearance] : normal appearance [Well Groomed] : well groomed [General Appearance - In No Acute Distress] : no acute distress [Respiration, Rhythm And Depth] : normal respiratory rhythm and effort [Exaggerated Use Of Accessory Muscles For Inspiration] : no accessory muscle use [Abdomen Soft] : soft [Abdomen Tenderness] : non-tender [Costovertebral Angle Tenderness] : no ~M costovertebral angle tenderness [Urethral Meatus] : meatus normal [Penis Abnormality] : normal uncircumcised penis [Urinary Bladder Findings] : the bladder was normal on palpation [Scrotum] : the scrotum was normal [Testes Tenderness] : no tenderness of the testes [Testes Mass (___cm)] : there were no testicular masses [FreeTextEntry1] : declined LESIA [] : no rash [Oriented To Time, Place, And Person] : oriented to person, place, and time [Affect] : the affect was normal [Mood] : the mood was normal [Not Anxious] : not anxious [Inguinal Lymph Nodes Enlarged Bilaterally] : inguinal

## 2020-10-22 NOTE — ASSESSMENT
[FreeTextEntry1] : WBC in UA in absence of UTI is not general considered a significant finding. He is voiding relatively well and PVR is acceptable. PSA was normal. Suggest renal US to rule out any kidney stones. He will call me for the results. \par \par Danish Mancia MD, FACS\par [facility]\par  of Urology\par [address] [place] [address], [address]\[place] [address]\par Tel: [phone]\par Fax: [phone]\par

## 2020-10-30 ENCOUNTER — APPOINTMENT (OUTPATIENT)
Dept: WOUND CARE | Facility: CLINIC | Age: 50
End: 2020-10-30
Payer: COMMERCIAL

## 2020-10-30 VITALS
HEART RATE: 85 BPM | TEMPERATURE: 97.2 F | DIASTOLIC BLOOD PRESSURE: 85 MMHG | HEIGHT: 69 IN | SYSTOLIC BLOOD PRESSURE: 130 MMHG | WEIGHT: 260 LBS | BODY MASS INDEX: 38.51 KG/M2

## 2020-10-30 PROCEDURE — 99072 ADDL SUPL MATRL&STAF TM PHE: CPT

## 2020-10-30 PROCEDURE — 11042 DBRDMT SUBQ TIS 1ST 20SQCM/<: CPT

## 2020-10-30 NOTE — PLAN
[FreeTextEntry1] : Rx X-rays right heel 3 views revealed no signs of any obvious osteomyelitis\par consider MRI with contrast for right heel but no signs of cellulitis or probing of wound to bone so we will hold off on getting MRI at this point\par decreased size and depth of wound\par no signs of cellulitis no signs of acute infection\par full thickness debridement of ulceration right heel with sterile 3 10 blade down to the level of the subcutaneous tissue\par continue with santyl collagenase daily wound care dressings\par no need for oral antibiosis since there is no signs of cellulitis but concerning for chronic osteomyelitis right heel\par Continue with multipodus boot to off load posterior right heel wound refer again to [facility]\par +Diabetic pressure wound right posterior plantar heel\par Patient is using the off loading boot or heel relief shoe\par stress importance of getting off loading boot to avoid bone infection of calcaneus\par stress importance of staying off of right foot and keeping pressure off of right heel\par patient told that 50% of all patients with calcaneal osteomyelitis will need a below knee amputation so it is important to not wear sandals and properly off load right heel at all times of day and night\par

## 2020-10-30 NOTE — HISTORY OF PRESENT ILLNESS
[FreeTextEntry1] : Patient was hospitalized for April 9 , 2020 for covid at [facility] and discharged 7/1/20\par patient was in bed and developed right heel wound Patient has been using santyl collagenase and daily dressing changes\par patient saw Dr. jalloh last week who debrided wound and referred to wound center\par granular wound base with minimal fibrotic tissue\par significant depth 2.0 cm to ulcer posterior right heel\par concerning for probing to bone and possible osteomyelitis\par patient using santyl collagenase right heel\par granular wound not probing to bone

## 2020-11-02 ENCOUNTER — TRANSCRIPTION ENCOUNTER (OUTPATIENT)
Age: 50
End: 2020-11-02

## 2020-11-09 ENCOUNTER — APPOINTMENT (OUTPATIENT)
Dept: ULTRASOUND IMAGING | Facility: IMAGING CENTER | Age: 50
End: 2020-11-09
Payer: COMMERCIAL

## 2020-11-09 ENCOUNTER — NON-APPOINTMENT (OUTPATIENT)
Age: 50
End: 2020-11-09

## 2020-11-09 ENCOUNTER — OUTPATIENT (OUTPATIENT)
Dept: OUTPATIENT SERVICES | Facility: HOSPITAL | Age: 50
LOS: 1 days | End: 2020-11-09
Payer: COMMERCIAL

## 2020-11-09 DIAGNOSIS — R82.90 UNSPECIFIED ABNORMAL FINDINGS IN URINE: ICD-10-CM

## 2020-11-09 DIAGNOSIS — Z98.890 OTHER SPECIFIED POSTPROCEDURAL STATES: Chronic | ICD-10-CM

## 2020-11-09 PROCEDURE — 76770 US EXAM ABDO BACK WALL COMP: CPT | Mod: 26

## 2020-11-09 PROCEDURE — 76770 US EXAM ABDO BACK WALL COMP: CPT

## 2020-11-13 ENCOUNTER — APPOINTMENT (OUTPATIENT)
Dept: WOUND CARE | Facility: CLINIC | Age: 50
End: 2020-11-13
Payer: COMMERCIAL

## 2020-11-13 PROCEDURE — 99072 ADDL SUPL MATRL&STAF TM PHE: CPT

## 2020-11-13 PROCEDURE — 11042 DBRDMT SUBQ TIS 1ST 20SQCM/<: CPT

## 2020-11-13 NOTE — HISTORY OF PRESENT ILLNESS
[FreeTextEntry1] : Patient was hospitalized for April 9 , 2020 for covid at [facility] and discharged 7/1/20\par patient was in bed and developed right heel wound Patient has been using santyl collagenase and daily dressing changes\par patient saw Dr. jalloh last week who debrided wound and referred to wound center\par granular wound base with minimal fibrotic tissue\par significant depth 2.0 cm to ulcer posterior right heel\par concerning for probing to bone and possible osteomyelitis\par patient using santyl collagenase right heel\par granular wound not probing to bone

## 2020-11-16 ENCOUNTER — APPOINTMENT (OUTPATIENT)
Dept: CARDIOLOGY | Facility: CLINIC | Age: 50
End: 2020-11-16
Payer: COMMERCIAL

## 2020-11-16 ENCOUNTER — NON-APPOINTMENT (OUTPATIENT)
Age: 50
End: 2020-11-16

## 2020-11-16 VITALS
OXYGEN SATURATION: 97 % | WEIGHT: 267 LBS | DIASTOLIC BLOOD PRESSURE: 90 MMHG | TEMPERATURE: 98.1 F | SYSTOLIC BLOOD PRESSURE: 160 MMHG | BODY MASS INDEX: 39.55 KG/M2 | HEART RATE: 60 BPM | HEIGHT: 69 IN

## 2020-11-16 DIAGNOSIS — R82.90 UNSPECIFIED ABNORMAL FINDINGS IN URINE: ICD-10-CM

## 2020-11-16 DIAGNOSIS — E83.51 HYPOCALCEMIA: ICD-10-CM

## 2020-11-16 PROCEDURE — 93000 ELECTROCARDIOGRAM COMPLETE: CPT

## 2020-11-16 PROCEDURE — 99214 OFFICE O/P EST MOD 30 MIN: CPT

## 2020-11-16 PROCEDURE — 99072 ADDL SUPL MATRL&STAF TM PHE: CPT

## 2020-11-16 NOTE — HISTORY OF PRESENT ILLNESS
[FreeTextEntry1] : This is a 50 year old gentlemen who presents today for follow up after recently being seen in our office on 10/13/2020. Patient states that he is still experiencing pain in his wrists, despite being on Cymbalta and Lyrica. Patient has followed up with Urology re: abnormal UA and had a Renal US. Patient also has been following with Dr. Cameron at the wound center for the Right Heel Ulcer.

## 2020-11-17 ENCOUNTER — APPOINTMENT (OUTPATIENT)
Dept: PULMONOLOGY | Facility: CLINIC | Age: 50
End: 2020-11-17

## 2020-11-20 NOTE — ED PROVIDER NOTE - CROS ED NEURO ALL NEG
1455- Pt arrived to [facility] ambulatory in no acute distress for C12 Trazimera/Perjeta. Assessment unremarkable. R chest port accessed without issue and positive blood return noted. Patient to MD office for appt. The following medications administered:  NS @ KVO  Trazimera 460 mg IV over 30 mins  Perjeta 420 mg IV over 30 mins    Patient Vitals for the past 12 hrs:   Temp Pulse Resp BP SpO2   11/20/20 1639 -- 78 16 112/61 --   11/20/20 1456 97.7 °F (36.5 °C) 91 16 115/74 97 %       1640- Pt tolerated treatment well, no adverse reactions noted. Port flushed per policy and de-accessed, 2x2 and tape placed. Pt discharged ambulatory in no acute distress, accompanied by self. Next appointment 12/11/20. - - -

## 2020-11-24 ENCOUNTER — APPOINTMENT (OUTPATIENT)
Dept: ORTHOPEDIC SURGERY | Facility: CLINIC | Age: 50
End: 2020-11-24
Payer: COMMERCIAL

## 2020-11-24 VITALS — BODY MASS INDEX: 40.14 KG/M2 | WEIGHT: 271 LBS | HEIGHT: 69 IN

## 2020-11-24 PROCEDURE — 99203 OFFICE O/P NEW LOW 30 MIN: CPT

## 2020-12-01 ENCOUNTER — RX RENEWAL (OUTPATIENT)
Age: 50
End: 2020-12-01

## 2020-12-04 ENCOUNTER — APPOINTMENT (OUTPATIENT)
Dept: WOUND CARE | Facility: CLINIC | Age: 50
End: 2020-12-04
Payer: COMMERCIAL

## 2020-12-04 PROCEDURE — 11042 DBRDMT SUBQ TIS 1ST 20SQCM/<: CPT

## 2020-12-04 PROCEDURE — 99072 ADDL SUPL MATRL&STAF TM PHE: CPT

## 2020-12-04 NOTE — PLAN
[FreeTextEntry1] : X-rays right heel 3 views revealed no signs of any obvious osteomyelitis\par consider MRI with contrast for right heel but no signs of cellulitis or probing of wound to bone so we will hold off on getting MRI at this point\par decreased size and depth of wound\par no signs of cellulitis no signs of acute infection\par full thickness debridement of ulceration right heel with sterile 3 10 blade down to the level of the subcutaneous tissue tissue removed, fibrous, hyperkeratotic anc granular tissue\par continue with santyl collagenase daily wound care dressings\par no need for oral antibiosis since there is no signs of cellulitis but concerning for chronic osteomyelitis right heel\par Continue with multipodus boot to off load posterior right heel wound\par patient admits to not wearing multipodus boot every night \par Recommend patient use multipodus boot every night\par +Diabetic pressure wound right posterior plantar heel\par Patient is using the off loading boot or heel relief shoe\par stress importance of getting off loading boot to avoid bone infection of calcaneus\par stress importance of staying off of right foot and keeping pressure off of right heel\par patient told that 50% of all patients with calcaneal osteomyelitis will need a below knee amputation so it is important to not wear sandals and properly off load right heel at all times of day and night\par return 2 weeks\par

## 2020-12-16 ENCOUNTER — NON-APPOINTMENT (OUTPATIENT)
Age: 50
End: 2020-12-16

## 2020-12-18 ENCOUNTER — APPOINTMENT (OUTPATIENT)
Dept: WOUND CARE | Facility: CLINIC | Age: 50
End: 2020-12-18
Payer: COMMERCIAL

## 2020-12-18 PROCEDURE — 99072 ADDL SUPL MATRL&STAF TM PHE: CPT

## 2020-12-18 PROCEDURE — 11042 DBRDMT SUBQ TIS 1ST 20SQCM/<: CPT

## 2020-12-18 NOTE — HISTORY OF PRESENT ILLNESS
[FreeTextEntry1] : Patient was hospitalized for April 9 , 2020 for covid at [facility] and discharged 7/1/20\par patient was in bed and developed right heel wound Patient has been using santyl collagenase and daily dressing changes\par patient saw Dr. jalloh last week who debrided wound and referred to wound center\par granular wound base with minimal fibrotic tissue\par significant depth 2.0 cm to ulcer posterior right heel\par concerning for probing to bone and possible osteomyelitis\par patient using santyl collagenase right heel\par granular wound not probing to bone

## 2020-12-18 NOTE — PLAN
[FreeTextEntry1] : X-rays right heel 3 views revealed no signs of any obvious osteomyelitis\par consider MRI with contrast for right heel but no signs of cellulitis or probing of wound to bone so we will hold off on getting MRI at this point\par decreased size and depth of wound\par no signs of cellulitis no signs of acute infection\par full thickness debridement of ulceration right heel with sterile 3 10 blade down to the level of the subcutaneous tissue tissue removed, fibrous, hyperkeratotic and\par  granular tissue\par continue with santyl collagenase daily wound care dressings\par no need for oral antibiosis since there is no signs of cellulitis but concerning for chronic osteomyelitis right heel\par Continue with multipodus boot to off load posterior right heel wound\par patient admits to not wearing multipodus boot every night \par Recommend patient use multipodus boot every night\par +Diabetic pressure wound right posterior plantar heel\par Patient is using the off loading boot or heel relief shoe\par stress importance of getting off loading boot to avoid bone infection of calcaneus\par stress importance of staying off of right foot and keeping pressure off of right heel\par patient told that 50% of all patients with calcaneal osteomyelitis will need a below knee amputation so it is important to not wear sandals and properly off load right heel at all times of day and night\par Patient still not wearing multipodus boots but area is slowly healing\par patient to continue with santyl and daily dressing changes\par return 2 weeks\par

## 2020-12-23 ENCOUNTER — APPOINTMENT (OUTPATIENT)
Dept: CARDIOLOGY | Facility: CLINIC | Age: 50
End: 2020-12-23
Payer: COMMERCIAL

## 2020-12-23 VITALS
WEIGHT: 269 LBS | TEMPERATURE: 98.7 F | BODY MASS INDEX: 39.84 KG/M2 | DIASTOLIC BLOOD PRESSURE: 96 MMHG | HEART RATE: 73 BPM | SYSTOLIC BLOOD PRESSURE: 130 MMHG | HEIGHT: 69 IN | OXYGEN SATURATION: 98 %

## 2020-12-23 PROCEDURE — 93000 ELECTROCARDIOGRAM COMPLETE: CPT

## 2020-12-23 PROCEDURE — 99072 ADDL SUPL MATRL&STAF TM PHE: CPT

## 2020-12-23 PROCEDURE — 99214 OFFICE O/P EST MOD 30 MIN: CPT

## 2020-12-23 RX ORDER — AMLODIPINE BESYLATE 5 MG/1
5 TABLET ORAL DAILY
Qty: 90 | Refills: 3 | Status: DISCONTINUED | COMMUNITY
Start: 2020-11-16 | End: 2020-12-23

## 2020-12-23 NOTE — REASON FOR VISIT
[Follow-Up - Clinic] : a clinic follow-up of [Hypertension] : hypertension [FreeTextEntry1] : F/U BP Recheck

## 2020-12-23 NOTE — HISTORY OF PRESENT ILLNESS
[FreeTextEntry1] : This is a 50 year old gentlemen who presents today for follow up. Patient was last seen in our office on 11/16/2020. Patient states that he has been following closely with Dr. Herring for his left wrist drop. He has been attending [facility] rehab twice weekly, which he states has been helpful.  Patient also following with Wound Care re: right foot heel ulcer. Patient states that since taking the Norvasc, he has been feeling "funny." He describes this sensation as "feeling lethargic". Patient has not seen hematologist, and states that he rescheduled his GI appointment. Patient denies dyspnea, palpitations, chest pain, nausea, vomiting, dizziness and lightheadedness.\par

## 2021-01-06 ENCOUNTER — NON-APPOINTMENT (OUTPATIENT)
Age: 51
End: 2021-01-06

## 2021-01-07 ENCOUNTER — APPOINTMENT (OUTPATIENT)
Dept: GASTROENTEROLOGY | Facility: CLINIC | Age: 51
End: 2021-01-07
Payer: COMMERCIAL

## 2021-01-07 VITALS
BODY MASS INDEX: 41.18 KG/M2 | SYSTOLIC BLOOD PRESSURE: 130 MMHG | WEIGHT: 278 LBS | DIASTOLIC BLOOD PRESSURE: 80 MMHG | HEIGHT: 69 IN | HEART RATE: 82 BPM | TEMPERATURE: 98.1 F | OXYGEN SATURATION: 99 %

## 2021-01-07 DIAGNOSIS — R79.89 OTHER SPECIFIED ABNORMAL FINDINGS OF BLOOD CHEMISTRY: ICD-10-CM

## 2021-01-07 PROCEDURE — 99244 OFF/OP CNSLTJ NEW/EST MOD 40: CPT

## 2021-01-07 PROCEDURE — 99072 ADDL SUPL MATRL&STAF TM PHE: CPT

## 2021-01-07 NOTE — ASSESSMENT
[FreeTextEntry1] :  The risks benefits alternatives and complications of the procedure/s were explained to the patient at length. The patient was agreeable and we will proceed.\par \par F/U labs\par \par No overt GIB

## 2021-01-07 NOTE — HISTORY OF PRESENT ILLNESS
[de-identified] : Long lopez with Covid\par \par Now Fe Def\par \par No prior Colon\par \par  The risks benefits alternatives and complications of the procedure/s were explained to the patient at length. The patient was agreeable and we will proceed.\par \par

## 2021-01-07 NOTE — PHYSICAL EXAM
[FreeTextEntry1] : arm in brace [Sclera] : the sclera and conjunctiva were normal [PERRL With Normal Accommodation] : pupils were equal in size, round, and reactive to light [Extraocular Movements] : extraocular movements were intact [Outer Ear] : the ears and nose were normal in appearance [Oropharynx] : the oropharynx was normal [] : no respiratory distress [Auscultation Breath Sounds / Voice Sounds] : lungs were clear to auscultation bilaterally [Normal] : normal [Soft, Nontender] : the abdomen was soft and nontender [No Mass] : no masses were palpated [No HSM] : no hepatosplenomegaly noted [Cervical Lymph Nodes Enlarged Posterior Bilaterally] : posterior cervical [Cervical Lymph Nodes Enlarged Anterior Bilaterally] : anterior cervical [Supraclavicular Lymph Nodes Enlarged Bilaterally] : supraclavicular [Axillary Lymph Nodes Enlarged Bilaterally] : axillary [Femoral Lymph Nodes Enlarged Bilaterally] : femoral [Inguinal Lymph Nodes Enlarged Bilaterally] : inguinal [No CVA Tenderness] : no ~M costovertebral angle tenderness [No Spinal Tenderness] : no spinal tenderness [Abnormal Walk] : normal gait [Nail Clubbing] : no clubbing  or cyanosis of the fingernails [Musculoskeletal - Swelling] : no joint swelling seen [Motor Tone] : muscle strength and tone were normal [Oriented To Time, Place, And Person] : oriented to person, place, and time [Impaired Insight] : insight and judgment were intact [Affect] : the affect was normal

## 2021-01-08 ENCOUNTER — APPOINTMENT (OUTPATIENT)
Dept: WOUND CARE | Facility: CLINIC | Age: 51
End: 2021-01-08
Payer: COMMERCIAL

## 2021-01-08 VITALS
HEART RATE: 80 BPM | SYSTOLIC BLOOD PRESSURE: 138 MMHG | RESPIRATION RATE: 18 BRPM | TEMPERATURE: 97.7 F | DIASTOLIC BLOOD PRESSURE: 82 MMHG

## 2021-01-08 DIAGNOSIS — L97.419 NON-PRESSURE CHRONIC ULCER OF RIGHT HEEL AND MIDFOOT WITH UNSPECIFIED SEVERITY: ICD-10-CM

## 2021-01-08 PROCEDURE — 99072 ADDL SUPL MATRL&STAF TM PHE: CPT

## 2021-01-08 PROCEDURE — 11042 DBRDMT SUBQ TIS 1ST 20SQCM/<: CPT

## 2021-01-08 NOTE — PLAN
[FreeTextEntry1] : X-rays right heel 3 views revealed no signs of any obvious osteomyelitis\par No need for MRI right foot\par decreased size and depth of wound\par no signs of cellulitis no signs of acute infection\par full thickness debridement of ulceration right heel with sterile # 10 blade down to the level of the subcutaneous tissue tissue removed, fibrous, hyperkeratotic and\par  granular tissue\par continue with santyl collagenase daily wound care dressings\par no need for oral antibiosis since there is no signs of cellulitis but concerning for chronic osteomyelitis right heel\par Continue with multipodus boot to off load posterior right heel wound\par patient admits to not wearing multipodus boot every night \par Recommend patient use multipodus boot every night\par +Diabetic pressure wound right posterior plantar heel\par Patient is using the off loading boot or heel relief shoe\par stress importance of getting off loading boot to avoid bone infection of calcaneus\par stress importance of staying off of right foot and keeping pressure off of right heel\par patient told that 50% of all patients with calcaneal osteomyelitis will need a below knee amputation so it is important to not wear sandals and properly off load right heel at all times of day and night\par Patient still not wearing multipodus boots but area is slowly healing\par patient to continue with santyl and daily dressing changes\par return 2 weeks\par

## 2021-01-08 NOTE — HISTORY OF PRESENT ILLNESS
[FreeTextEntry1] : Patient was hospitalized for April 9 , 2020 for covid at [facility] and discharged 7/1/20\par patient was in bed and developed right heel wound Patient has been using santyl collagenase and daily dressing changes\par patient saw Dr. jalloh last week who debrided wound and referred to wound center\par granular wound base with minimal fibrotic tissue\par significant depth 2.0 cm to ulcer posterior right heel\par concerning for probing to bone and possible osteomyelitis\par patient using santyl collagenase right heel\par granular wound not probing to bone\par DECREASED SIZE AND DEPTH OF WOUNDS RIGHT FOOT

## 2021-01-20 ENCOUNTER — APPOINTMENT (OUTPATIENT)
Dept: CARDIOLOGY | Facility: CLINIC | Age: 51
End: 2021-01-20
Payer: COMMERCIAL

## 2021-01-20 VITALS
HEIGHT: 69 IN | TEMPERATURE: 98.1 F | DIASTOLIC BLOOD PRESSURE: 80 MMHG | OXYGEN SATURATION: 97 % | HEART RATE: 72 BPM | BODY MASS INDEX: 42.95 KG/M2 | SYSTOLIC BLOOD PRESSURE: 132 MMHG | WEIGHT: 290 LBS

## 2021-01-20 PROCEDURE — 99072 ADDL SUPL MATRL&STAF TM PHE: CPT

## 2021-01-20 PROCEDURE — 99213 OFFICE O/P EST LOW 20 MIN: CPT

## 2021-01-20 NOTE — PHYSICAL EXAM
[General Appearance - Well Developed] : well developed [Normal Appearance] : normal appearance [Well Groomed] : well groomed [General Appearance - Well Nourished] : well nourished [No Deformities] : no deformities [General Appearance - In No Acute Distress] : no acute distress [Normal Conjunctiva] : the conjunctiva exhibited no abnormalities [Eyelids - No Xanthelasma] : the eyelids demonstrated no xanthelasmas [Normal Oral Mucosa] : normal oral mucosa [No Oral Pallor] : no oral pallor [No Oral Cyanosis] : no oral cyanosis [Normal Jugular Venous V Waves Present] : normal jugular venous V waves present [Normal Jugular Venous A Waves Present] : normal jugular venous A waves present [No Jugular Venous Mendoza A Waves] : no jugular venous mendoza A waves [Respiration, Rhythm And Depth] : normal respiratory rhythm and effort [Exaggerated Use Of Accessory Muscles For Inspiration] : no accessory muscle use [Auscultation Breath Sounds / Voice Sounds] : lungs were clear to auscultation bilaterally [Heart Rate And Rhythm] : heart rate and rhythm were normal [Heart Sounds] : normal S1 and S2 [Murmurs] : no murmurs present [Abdomen Soft] : soft [Abdomen Tenderness] : non-tender [Abdomen Mass (___ Cm)] : no abdominal mass palpated [Abnormal Walk] : normal gait [Gait - Sufficient For Exercise Testing] : the gait was sufficient for exercise testing [Nail Clubbing] : no clubbing of the fingernails [Cyanosis, Localized] : no localized cyanosis [Petechial Hemorrhages (___cm)] : no petechial hemorrhages [Skin Color & Pigmentation] : normal skin color and pigmentation [] : no rash [No Venous Stasis] : no venous stasis [Skin Lesions] : no skin lesions [No Skin Ulcers] : no skin ulcer [No Xanthoma] : no  xanthoma was observed [Oriented To Time, Place, And Person] : oriented to person, place, and time [Mood] : the mood was normal [Affect] : the affect was normal [No Anxiety] : not feeling anxious

## 2021-01-20 NOTE — HISTORY OF PRESENT ILLNESS
[FreeTextEntry1] : Adis is a 50yo male who returns to the office today for BP recheck. Patient previously seen in office 1 month ago and started on Felodipine at this time. Patient's BP much improved today. Patient also reports continued BL hand pain. No other issues or concerns for today. Patient denies chest pain, shortness of breath, palpitations, dizziness, vision changes, n/v, abdominal pain, changes in bowel/bladder habits,  or appetite.

## 2021-01-28 ENCOUNTER — NON-APPOINTMENT (OUTPATIENT)
Age: 51
End: 2021-01-28

## 2021-01-29 ENCOUNTER — APPOINTMENT (OUTPATIENT)
Dept: WOUND CARE | Facility: CLINIC | Age: 51
End: 2021-01-29
Payer: COMMERCIAL

## 2021-01-29 VITALS
TEMPERATURE: 96.4 F | DIASTOLIC BLOOD PRESSURE: 95 MMHG | RESPIRATION RATE: 17 BRPM | SYSTOLIC BLOOD PRESSURE: 143 MMHG | HEART RATE: 80 BPM

## 2021-01-29 PROCEDURE — 99072 ADDL SUPL MATRL&STAF TM PHE: CPT

## 2021-01-29 PROCEDURE — 11042 DBRDMT SUBQ TIS 1ST 20SQCM/<: CPT

## 2021-01-29 NOTE — HISTORY OF PRESENT ILLNESS
[FreeTextEntry1] : Patient was hospitalized for April 9 , 2020 for covid at [facility] and discharged 7/1/20\par patient was in bed and developed right heel wound Patient has been using santyl collagenase and daily dressing changes\par patient saw Dr. jalloh last week who debrided wound and referred to wound center\par granular wound base with minimal fibrotic tissue\par significant depth 2.0 cm to ulcer posterior right heel\par concerning for probing to bone and possible osteomyelitis\par patient using santyl collagenase right heel\par granular wound not probing to bone\par DECREASED SIZE AND DEPTH OF WOUNDS RIGHT FOOT

## 2021-02-12 ENCOUNTER — APPOINTMENT (OUTPATIENT)
Dept: ORTHOPEDIC SURGERY | Facility: CLINIC | Age: 51
End: 2021-02-12
Payer: COMMERCIAL

## 2021-02-12 DIAGNOSIS — M79.89 OTHER SPECIFIED SOFT TISSUE DISORDERS: ICD-10-CM

## 2021-02-12 PROCEDURE — 99214 OFFICE O/P EST MOD 30 MIN: CPT

## 2021-02-12 PROCEDURE — 99072 ADDL SUPL MATRL&STAF TM PHE: CPT

## 2021-02-19 ENCOUNTER — APPOINTMENT (OUTPATIENT)
Dept: WOUND CARE | Facility: CLINIC | Age: 51
End: 2021-02-19

## 2021-02-19 ENCOUNTER — APPOINTMENT (OUTPATIENT)
Dept: ORTHOPEDIC SURGERY | Facility: CLINIC | Age: 51
End: 2021-02-19

## 2021-02-26 ENCOUNTER — APPOINTMENT (OUTPATIENT)
Dept: WOUND CARE | Facility: CLINIC | Age: 51
End: 2021-02-26
Payer: COMMERCIAL

## 2021-02-26 VITALS
HEART RATE: 77 BPM | TEMPERATURE: 96.5 F | HEIGHT: 69 IN | DIASTOLIC BLOOD PRESSURE: 85 MMHG | SYSTOLIC BLOOD PRESSURE: 133 MMHG | WEIGHT: 290 LBS | BODY MASS INDEX: 42.95 KG/M2

## 2021-02-26 DIAGNOSIS — L97.413 NON-PRESSURE CHRONIC ULCER OF RIGHT HEEL AND MIDFOOT WITH NECROSIS OF MUSCLE: ICD-10-CM

## 2021-02-26 PROCEDURE — 99072 ADDL SUPL MATRL&STAF TM PHE: CPT

## 2021-02-26 PROCEDURE — 99213 OFFICE O/P EST LOW 20 MIN: CPT

## 2021-02-26 NOTE — PLAN
[FreeTextEntry1] :  patient instructed to continue offloading and managing area and to returned to office when/if needed.

## 2021-02-26 NOTE — HISTORY OF PRESENT ILLNESS
[FreeTextEntry1] : Patient was hospitalized for April 9 , 2020 for covid at [facility] and discharged 7/1/20\par patient was in bed and developed right heel wound Patient has been using santyl collagenase and daily dressing changes\par patient saw Dr. jalloh last week who debrided wound and referred to wound center\par granular wound base with minimal fibrotic tissue\par significant depth 2.0 cm to ulcer posterior right heel\par concerning for probing to bone and possible osteomyelitis\par patient using santyl collagenase right heel\par granular wound not probing to bone\par DECREASED SIZE AND DEPTH OF WOUNDS RIGHT FOOT

## 2021-02-26 NOTE — ASSESSMENT
[FreeTextEntry1] : pt seen and evaluated\par -spent 30 min in evaluation with patient\par -aseptic full thickness debridement of wound with #15 blade, to the level of and not beyond subQ. Tissue removed was Hyperkeratotic fibrotic granular bed. Patient is healed.\par \par

## 2021-03-17 NOTE — DISCHARGE NOTE ADULT - PLAN OF CARE
Psychotherapy Provided: Individual Psychotherapy 45 minutes     Length of time in session: 45 minutes, follow up in 2 week    Goals addressed in session: Goal 1     Pain:      none    0    Current suicide risk : Low     D- ct shared that he is concerned about his son  Ct son has struggled during pandemic especially with grades  Ct talked to son who broke down in tears  Ct and son had a good talk  Ct son is willing to speak with a therapist   Andrew Stephens and wife run up and down in regards to communication  Ct wife seems to react harshly or coldly at times  She also has a difficult time moving on from an argument  Ct hours will change next week and he will move from second shift to first shift  Ct session was virtual and ct agreed to parameters of a virtual session  A- ct was mildly anxious and emotional when talking about his son  Ct was verbal and engaged in session  Ct insight and judgement is fair  P- ct will attend session and use techniques to manage anxiety and improve communication in marriage  Behavioral Health Treatment Plan [facility]: Diagnosis and Treatment Plan explained to Lori Cote relates understanding diagnosis and is agreeable to Treatment Plan   Yes pain management Continue pain medications as needed. A1c Continue all medications.

## 2021-03-22 ENCOUNTER — APPOINTMENT (OUTPATIENT)
Dept: CARDIOLOGY | Facility: CLINIC | Age: 51
End: 2021-03-22
Payer: COMMERCIAL

## 2021-03-22 ENCOUNTER — NON-APPOINTMENT (OUTPATIENT)
Age: 51
End: 2021-03-22

## 2021-03-22 VITALS
WEIGHT: 294 LBS | DIASTOLIC BLOOD PRESSURE: 84 MMHG | TEMPERATURE: 98.6 F | SYSTOLIC BLOOD PRESSURE: 134 MMHG | RESPIRATION RATE: 16 BRPM | OXYGEN SATURATION: 95 % | BODY MASS INDEX: 43.42 KG/M2 | HEART RATE: 116 BPM

## 2021-03-22 VITALS — HEART RATE: 96 BPM

## 2021-03-22 DIAGNOSIS — M25.531 PAIN IN RIGHT WRIST: ICD-10-CM

## 2021-03-22 DIAGNOSIS — M25.532 PAIN IN RIGHT WRIST: ICD-10-CM

## 2021-03-22 DIAGNOSIS — R00.0 TACHYCARDIA, UNSPECIFIED: ICD-10-CM

## 2021-03-22 LAB
25(OH)D3 SERPL-MCNC: 38.9 NG/ML
ALBUMIN SERPL ELPH-MCNC: 4.4 G/DL
ALBUMIN SERPL ELPH-MCNC: 4.7 G/DL
ALP BLD-CCNC: 91 U/L
ALP BLD-CCNC: 97 U/L
ALT SERPL-CCNC: 6 U/L
ALT SERPL-CCNC: 7 U/L
ANION GAP SERPL CALC-SCNC: 13 MMOL/L
ANION GAP SERPL CALC-SCNC: 14 MMOL/L
ANION GAP SERPL CALC-SCNC: 15 MMOL/L
AST SERPL-CCNC: 15 U/L
AST SERPL-CCNC: 18 U/L
BASOPHILS # BLD AUTO: 0.04 K/UL
BASOPHILS # BLD AUTO: 0.04 K/UL
BASOPHILS # BLD AUTO: 0.05 K/UL
BASOPHILS NFR BLD AUTO: 0.6 %
BASOPHILS NFR BLD AUTO: 0.7 %
BASOPHILS NFR BLD AUTO: 0.9 %
BILIRUB DIRECT SERPL-MCNC: 0.1 MG/DL
BILIRUB DIRECT SERPL-MCNC: 0.1 MG/DL
BILIRUB INDIRECT SERPL-MCNC: 0.2 MG/DL
BILIRUB INDIRECT SERPL-MCNC: 0.2 MG/DL
BILIRUB SERPL-MCNC: 0.3 MG/DL
BILIRUB SERPL-MCNC: 0.3 MG/DL
BUN SERPL-MCNC: 12 MG/DL
BUN SERPL-MCNC: 13 MG/DL
BUN SERPL-MCNC: 16 MG/DL
CALCIUM SERPL-MCNC: 10.2 MG/DL
CALCIUM SERPL-MCNC: 9.6 MG/DL
CALCIUM SERPL-MCNC: 9.9 MG/DL
CHLORIDE SERPL-SCNC: 102 MMOL/L
CHLORIDE SERPL-SCNC: 103 MMOL/L
CHLORIDE SERPL-SCNC: 104 MMOL/L
CHOLEST SERPL-MCNC: 213 MG/DL
CHOLEST/HDLC SERPL: 4.1 RATIO
CK SERPL-CCNC: 143 U/L
CK SERPL-CCNC: 201 U/L
CO2 SERPL-SCNC: 24 MMOL/L
CO2 SERPL-SCNC: 24 MMOL/L
CO2 SERPL-SCNC: 26 MMOL/L
CREAT SERPL-MCNC: 1.23 MG/DL
CREAT SERPL-MCNC: 1.31 MG/DL
CREAT SERPL-MCNC: 1.35 MG/DL
EOSINOPHIL # BLD AUTO: 0.15 K/UL
EOSINOPHIL # BLD AUTO: 0.16 K/UL
EOSINOPHIL # BLD AUTO: 0.22 K/UL
EOSINOPHIL NFR BLD AUTO: 2.4 %
EOSINOPHIL NFR BLD AUTO: 2.9 %
EOSINOPHIL NFR BLD AUTO: 3.6 %
ESTIMATED AVERAGE GLUCOSE: 114 MG/DL
FERRITIN SERPL-MCNC: 543 NG/ML
FERRITIN SERPL-MCNC: 547 NG/ML
FERRITIN SERPL-MCNC: 629 NG/ML
FOLATE SERPL-MCNC: >20 NG/ML
GLUCOSE SERPL-MCNC: 80 MG/DL
GLUCOSE SERPL-MCNC: 87 MG/DL
GLUCOSE SERPL-MCNC: 93 MG/DL
HBA1C MFR BLD HPLC: 5.6 %
HCT VFR BLD CALC: 41.4 %
HCT VFR BLD CALC: 44.3 %
HCT VFR BLD CALC: 45.1 %
HDLC SERPL-MCNC: 52 MG/DL
HGB BLD-MCNC: 13.3 G/DL
HGB BLD-MCNC: 13.7 G/DL
HGB BLD-MCNC: 14.2 G/DL
IMM GRANULOCYTES NFR BLD AUTO: 0 %
IMM GRANULOCYTES NFR BLD AUTO: 0.2 %
IMM GRANULOCYTES NFR BLD AUTO: 0.2 %
IRON SERPL-MCNC: 45 UG/DL
LDLC SERPL CALC-MCNC: 132 MG/DL
LDLC SERPL DIRECT ASSAY-MCNC: 115 MG/DL
LYMPHOCYTES # BLD AUTO: 2.6 K/UL
LYMPHOCYTES # BLD AUTO: 2.68 K/UL
LYMPHOCYTES # BLD AUTO: 3.02 K/UL
LYMPHOCYTES NFR BLD AUTO: 40.9 %
LYMPHOCYTES NFR BLD AUTO: 47.9 %
LYMPHOCYTES NFR BLD AUTO: 49.9 %
MAN DIFF?: NORMAL
MCHC RBC-ENTMCNC: 29 PG
MCHC RBC-ENTMCNC: 29 PG
MCHC RBC-ENTMCNC: 29.5 PG
MCHC RBC-ENTMCNC: 30.9 GM/DL
MCHC RBC-ENTMCNC: 31.5 GM/DL
MCHC RBC-ENTMCNC: 32.1 GM/DL
MCV RBC AUTO: 90.2 FL
MCV RBC AUTO: 93.7 FL
MCV RBC AUTO: 93.8 FL
MONOCYTES # BLD AUTO: 0.46 K/UL
MONOCYTES # BLD AUTO: 0.54 K/UL
MONOCYTES # BLD AUTO: 0.62 K/UL
MONOCYTES NFR BLD AUTO: 7.6 %
MONOCYTES NFR BLD AUTO: 9.6 %
MONOCYTES NFR BLD AUTO: 9.8 %
NEUTROPHILS # BLD AUTO: 2.17 K/UL
NEUTROPHILS # BLD AUTO: 2.3 K/UL
NEUTROPHILS # BLD AUTO: 2.93 K/UL
NEUTROPHILS NFR BLD AUTO: 38 %
NEUTROPHILS NFR BLD AUTO: 38.7 %
NEUTROPHILS NFR BLD AUTO: 46.1 %
PLATELET # BLD AUTO: 211 K/UL
PLATELET # BLD AUTO: 221 K/UL
PLATELET # BLD AUTO: 226 K/UL
POTASSIUM SERPL-SCNC: 3.7 MMOL/L
POTASSIUM SERPL-SCNC: 4 MMOL/L
POTASSIUM SERPL-SCNC: 4.3 MMOL/L
PROT SERPL-MCNC: 7.8 G/DL
PROT SERPL-MCNC: 8.2 G/DL
RBC # BLD: 4.59 M/UL
RBC # BLD: 4.73 M/UL
RBC # BLD: 4.81 M/UL
RBC # FLD: 12.7 %
RBC # FLD: 13.1 %
RBC # FLD: 13.8 %
SARS-COV-2 IGG SERPL IA-ACNC: 8.35 INDEX
SARS-COV-2 IGG SERPL QL IA: POSITIVE
SODIUM SERPL-SCNC: 140 MMOL/L
SODIUM SERPL-SCNC: 141 MMOL/L
SODIUM SERPL-SCNC: 143 MMOL/L
TRIGL SERPL-MCNC: 145 MG/DL
VIT B12 SERPL-MCNC: 717 PG/ML
WBC # FLD AUTO: 5.6 K/UL
WBC # FLD AUTO: 6.05 K/UL
WBC # FLD AUTO: 6.35 K/UL

## 2021-03-22 PROCEDURE — 93000 ELECTROCARDIOGRAM COMPLETE: CPT

## 2021-03-22 PROCEDURE — 99072 ADDL SUPL MATRL&STAF TM PHE: CPT

## 2021-03-22 PROCEDURE — 99214 OFFICE O/P EST MOD 30 MIN: CPT

## 2021-03-22 NOTE — PHYSICAL EXAM
[General Appearance - Well Developed] : well developed [Normal Appearance] : normal appearance [Well Groomed] : well groomed [General Appearance - Well Nourished] : well nourished [No Deformities] : no deformities [General Appearance - In No Acute Distress] : no acute distress [Normal Conjunctiva] : the conjunctiva exhibited no abnormalities [Eyelids - No Xanthelasma] : the eyelids demonstrated no xanthelasmas [Normal Oral Mucosa] : normal oral mucosa [No Oral Pallor] : no oral pallor [No Oral Cyanosis] : no oral cyanosis [Normal Jugular Venous A Waves Present] : normal jugular venous A waves present [Normal Jugular Venous V Waves Present] : normal jugular venous V waves present [No Jugular Venous Mendoza A Waves] : no jugular venous mendoza A waves [Respiration, Rhythm And Depth] : normal respiratory rhythm and effort [Exaggerated Use Of Accessory Muscles For Inspiration] : no accessory muscle use [Auscultation Breath Sounds / Voice Sounds] : lungs were clear to auscultation bilaterally [Heart Rate And Rhythm] : heart rate and rhythm were normal [Heart Sounds] : normal S1 and S2 [Murmurs] : no murmurs present [Abdomen Soft] : soft [Abdomen Tenderness] : non-tender [Abdomen Mass (___ Cm)] : no abdominal mass palpated [Abnormal Walk] : normal gait [Gait - Sufficient For Exercise Testing] : the gait was sufficient for exercise testing [Skin Color & Pigmentation] : normal skin color and pigmentation [] : no rash [No Venous Stasis] : no venous stasis [Skin Lesions] : no skin lesions [No Skin Ulcers] : no skin ulcer [No Xanthoma] : no  xanthoma was observed [Oriented To Time, Place, And Person] : oriented to person, place, and time [Affect] : the affect was normal [Mood] : the mood was normal [No Anxiety] : not feeling anxious [FreeTextEntry1] : Brace to the left hand and right foot /weakness bilateral arms

## 2021-03-22 NOTE — HISTORY OF PRESENT ILLNESS
[FreeTextEntry1] : This is a 51 year old gentlemen who presents today for follow up. Patient states that he is overall feeling OK. He still experiences some occasional wrist pain and tingling in the hands and feet. Patient did not go to the hematologist for elevated ferritin work up. Patient has been following with the wound specialist, and his wound has been heeling. Patient denies dyspnea, palpitations, chest pain, nausea, vomiting, dizziness and lightheadedness.\par

## 2021-03-23 ENCOUNTER — APPOINTMENT (OUTPATIENT)
Dept: ORTHOPEDIC SURGERY | Facility: CLINIC | Age: 51
End: 2021-03-23
Payer: COMMERCIAL

## 2021-03-23 DIAGNOSIS — M21.332 WRIST DROP, LEFT WRIST: ICD-10-CM

## 2021-03-23 PROCEDURE — 99214 OFFICE O/P EST MOD 30 MIN: CPT

## 2021-03-23 PROCEDURE — 99072 ADDL SUPL MATRL&STAF TM PHE: CPT

## 2021-03-28 PROBLEM — M21.332 LEFT WRISTDROP: Status: ACTIVE | Noted: 2020-11-24

## 2021-03-29 ENCOUNTER — NON-APPOINTMENT (OUTPATIENT)
Age: 51
End: 2021-03-29

## 2021-04-07 ENCOUNTER — NON-APPOINTMENT (OUTPATIENT)
Age: 51
End: 2021-04-07

## 2021-04-08 ENCOUNTER — NON-APPOINTMENT (OUTPATIENT)
Age: 51
End: 2021-04-08

## 2021-04-13 NOTE — ED ADULT NURSE NOTE - CADM POA CENTRAL LINE
Please have labs/testing performed before next visit.  Continue with weight loss efforts  Resume Cardio exercise    
No

## 2021-04-19 ENCOUNTER — NON-APPOINTMENT (OUTPATIENT)
Age: 51
End: 2021-04-19

## 2021-04-20 ENCOUNTER — NON-APPOINTMENT (OUTPATIENT)
Age: 51
End: 2021-04-20

## 2021-04-22 ENCOUNTER — OUTPATIENT (OUTPATIENT)
Dept: OUTPATIENT SERVICES | Facility: HOSPITAL | Age: 51
LOS: 1 days | Discharge: HOME | End: 2021-04-22

## 2021-04-22 DIAGNOSIS — Z98.890 OTHER SPECIFIED POSTPROCEDURAL STATES: Chronic | ICD-10-CM

## 2021-04-22 DIAGNOSIS — D64.9 ANEMIA, UNSPECIFIED: ICD-10-CM

## 2021-05-21 ENCOUNTER — LABORATORY RESULT (OUTPATIENT)
Age: 51
End: 2021-05-21

## 2021-05-21 ENCOUNTER — APPOINTMENT (OUTPATIENT)
Dept: HEMATOLOGY ONCOLOGY | Facility: CLINIC | Age: 51
End: 2021-05-21
Payer: COMMERCIAL

## 2021-05-21 ENCOUNTER — RESULT REVIEW (OUTPATIENT)
Age: 51
End: 2021-05-21

## 2021-05-21 VITALS
OXYGEN SATURATION: 97 % | TEMPERATURE: 97.9 F | BODY MASS INDEX: 43.52 KG/M2 | HEIGHT: 69.09 IN | WEIGHT: 293.85 LBS | DIASTOLIC BLOOD PRESSURE: 82 MMHG | SYSTOLIC BLOOD PRESSURE: 124 MMHG | RESPIRATION RATE: 18 BRPM | HEART RATE: 98 BPM

## 2021-05-21 DIAGNOSIS — Z80.0 FAMILY HISTORY OF MALIGNANT NEOPLASM OF DIGESTIVE ORGANS: ICD-10-CM

## 2021-05-21 DIAGNOSIS — Z80.3 FAMILY HISTORY OF MALIGNANT NEOPLASM OF BREAST: ICD-10-CM

## 2021-05-21 DIAGNOSIS — Z78.9 OTHER SPECIFIED HEALTH STATUS: ICD-10-CM

## 2021-05-21 DIAGNOSIS — Z83.3 FAMILY HISTORY OF DIABETES MELLITUS: ICD-10-CM

## 2021-05-21 LAB
BASOPHILS # BLD AUTO: 0.02 K/UL — SIGNIFICANT CHANGE UP (ref 0–0.2)
BASOPHILS NFR BLD AUTO: 0.3 % — SIGNIFICANT CHANGE UP (ref 0–2)
EOSINOPHIL # BLD AUTO: 0.14 K/UL — SIGNIFICANT CHANGE UP (ref 0–0.5)
EOSINOPHIL NFR BLD AUTO: 2 % — SIGNIFICANT CHANGE UP (ref 0–6)
HCT VFR BLD CALC: 42.9 % — SIGNIFICANT CHANGE UP (ref 39–50)
HGB BLD-MCNC: 13.8 G/DL — SIGNIFICANT CHANGE UP (ref 13–17)
IMM GRANULOCYTES NFR BLD AUTO: 0.3 % — SIGNIFICANT CHANGE UP (ref 0–1.5)
LYMPHOCYTES # BLD AUTO: 2.41 K/UL — SIGNIFICANT CHANGE UP (ref 1–3.3)
LYMPHOCYTES # BLD AUTO: 34.5 % — SIGNIFICANT CHANGE UP (ref 13–44)
MCHC RBC-ENTMCNC: 29.3 PG — SIGNIFICANT CHANGE UP (ref 27–34)
MCHC RBC-ENTMCNC: 32.2 G/DL — SIGNIFICANT CHANGE UP (ref 32–36)
MCV RBC AUTO: 91.1 FL — SIGNIFICANT CHANGE UP (ref 80–100)
MONOCYTES # BLD AUTO: 0.62 K/UL — SIGNIFICANT CHANGE UP (ref 0–0.9)
MONOCYTES NFR BLD AUTO: 8.9 % — SIGNIFICANT CHANGE UP (ref 2–14)
NEUTROPHILS # BLD AUTO: 3.78 K/UL — SIGNIFICANT CHANGE UP (ref 1.8–7.4)
NEUTROPHILS NFR BLD AUTO: 54 % — SIGNIFICANT CHANGE UP (ref 43–77)
NRBC # BLD: 0 /100 WBCS — SIGNIFICANT CHANGE UP (ref 0–0)
PLATELET # BLD AUTO: 184 K/UL — SIGNIFICANT CHANGE UP (ref 150–400)
RBC # BLD: 4.71 M/UL — SIGNIFICANT CHANGE UP (ref 4.2–5.8)
RBC # FLD: 12.6 % — SIGNIFICANT CHANGE UP (ref 10.3–14.5)
WBC # BLD: 6.99 K/UL — SIGNIFICANT CHANGE UP (ref 3.8–10.5)
WBC # FLD AUTO: 6.99 K/UL — SIGNIFICANT CHANGE UP (ref 3.8–10.5)

## 2021-05-21 PROCEDURE — 99072 ADDL SUPL MATRL&STAF TM PHE: CPT

## 2021-05-21 PROCEDURE — 99204 OFFICE O/P NEW MOD 45 MIN: CPT

## 2021-05-21 RX ORDER — METOPROLOL TARTRATE 50 MG/1
50 TABLET, FILM COATED ORAL
Qty: 180 | Refills: 1 | Status: DISCONTINUED | COMMUNITY
End: 2021-05-21

## 2021-05-21 NOTE — ASSESSMENT
[FreeTextEntry1] : 52 yo gentleman with PMhx of HLD, anemia, DVT, HTN, A fib on Eliquis,  referred for elevated ferritin.\par \par Patient complaints of intermittent headaches, denies dizziness, lightheadedness, sometimes has generalized body aches. Denies fevers, night sweats or weight loss. Patient was admitted with COVID 19 on 4/2020 for 3 months ( was intubated), developed a DVT during that admission.  Denies family history of blood clots. \par \par 3/22/21 WBC 6.87, Hb 15.8 g/dl, Hct 47.8%, MCV 93, RDW 12.3%, PLTs 214, iron % saturation 16%, ALT 12, AST 21, ferritin 532. \par \par I had a detailed discussion today with the patient regarding the natural history, epidemiology, diagnosis, and treatment of elevated ferritin. I reviewed his laboratory  studies in detail today. I then discussed the differential diagnosis of elevated ferritin either an inflammation reactant less likely hemochromatosis.   I answered all his questions to satisfaction.\par \par HTN: Felodipine. \par \par A fib: On Eliquis.\par \par Patient still has symptoms secondary to COVID 19, peripheral neuropathy on Pregabalin. \par \par Greater than 50% of the encounter time was spent on counseling and coordination of care for  elevated Ferritin    and I have spent  45   minutes of face to face time with the patient.\par \par RTC  prn. \par

## 2021-05-21 NOTE — CONSULT LETTER
[Dear  ___] : Dear  [unfilled], [Consult Letter:] : I had the pleasure of evaluating your patient, [unfilled]. [Please see my note below.] : Please see my note below. [Consult Closing:] : Thank you very much for allowing me to participate in the care of this patient.  If you have any questions, please do not hesitate to contact me. [Sincerely,] : Sincerely, [FreeTextEntry2] : Dr Gonsalo Aguilera

## 2021-05-21 NOTE — HISTORY OF PRESENT ILLNESS
[0 - No Distress] : Distress Level: 0 [de-identified] : 52 yo gentleman with PMhx of HLD, anemia,  A fib on Eliquis, DVT, HTN, referred for elevated ferritin.\par \par Patient complaints of intermittent headaches, denies dizziness, lightheadedness, sometimes has generalized body aches. Denies fevers, night sweats or weight loss. Patient was admitted with COVID 19 on 4/2020 for 3 months ( was intubated), developed a DVT during that admission.  Denies family history of blood clots. \par \par 3/22/21 WBC 6.87, Hb 15.8 g/dl, Hct 47.8%, MCV 93, RDW 12.3%, PLTs 214, iron % saturation 16%, ALT 12, AST 21, ferritin 532.

## 2021-05-28 ENCOUNTER — NON-APPOINTMENT (OUTPATIENT)
Age: 51
End: 2021-05-28

## 2021-06-18 ENCOUNTER — NON-APPOINTMENT (OUTPATIENT)
Age: 51
End: 2021-06-18

## 2021-06-18 ENCOUNTER — APPOINTMENT (OUTPATIENT)
Dept: CARDIOLOGY | Facility: CLINIC | Age: 51
End: 2021-06-18
Payer: COMMERCIAL

## 2021-06-18 VITALS
HEART RATE: 79 BPM | DIASTOLIC BLOOD PRESSURE: 90 MMHG | BODY MASS INDEX: 43.1 KG/M2 | OXYGEN SATURATION: 95 % | SYSTOLIC BLOOD PRESSURE: 130 MMHG | WEIGHT: 291 LBS | HEIGHT: 69.09 IN | TEMPERATURE: 98.7 F

## 2021-06-18 VITALS — SYSTOLIC BLOOD PRESSURE: 124 MMHG | DIASTOLIC BLOOD PRESSURE: 90 MMHG

## 2021-06-18 DIAGNOSIS — U07.1 COVID-19: ICD-10-CM

## 2021-06-18 PROCEDURE — 99072 ADDL SUPL MATRL&STAF TM PHE: CPT

## 2021-06-18 PROCEDURE — 93000 ELECTROCARDIOGRAM COMPLETE: CPT

## 2021-06-18 PROCEDURE — 99214 OFFICE O/P EST MOD 30 MIN: CPT

## 2021-06-18 NOTE — HISTORY OF PRESENT ILLNESS
[FreeTextEntry1] : This is a 51 year old gentlemen with a PMH of Afib, HTN, HLD, Anemia, and neuropathy who presents today for follow up. Patient states that he is feeling fine and has no complaints at this time. On patient's last set of blood work patient with HgBA1C of %6.7, patient was not aware. Patient also with elevated cholesterol, despite patient taking Crestor 20 mg daily. Patient was seen by Dr. Quiñonez- for elevated ferritin. Patient denies dyspnea, palpitations, chest pain, nausea, vomiting, dizziness and lightheadedness.\par

## 2021-07-30 ENCOUNTER — APPOINTMENT (OUTPATIENT)
Dept: ENDOCRINOLOGY | Facility: CLINIC | Age: 51
End: 2021-07-30

## 2021-07-30 NOTE — HISTORY OF PRESENT ILLNESS
[FreeTextEntry1] : Mr. JACKSON LAL is a 51 year old male who presents for initial endocrine evaluation with regard to a hx of type 2 dm. The diabetes was noted when A1c returned at 6.7% on 3/22/2021 and repeat on 6/18 at 6.6%. A1c value on 10/13/2021 was at 5.6%.  years. He denies any history of retiopathy or nephropathy. With regard to neuropathy,he    . For the diabetes, he   is currently taking   He   denies polyuria, polydipsia, or any visual changes. He  too denies any skin lesions, skin breakdown or non-healing areas of skin. He  too denies any podiatric concerns. Ophthalmologic evaluation is up to date. Home glucose monitoring has shown values to be running.  He  does deny any hypoglycemia or hypoglycemic signs or symptoms.\par Additional medical history includes that of   A-fib, htn, hyperlipidemia, vitamin d deficiency and hx of dvt along with past Covid infection.\par

## 2021-09-10 NOTE — ED PROVIDER NOTE - NS CPE EDP [facility]
37.2
no midline spine tenderness, + R paraspinal tenderness, no erythema, no edema, no obvious deformity b/l.

## 2021-10-06 PROBLEM — U07.1 PNEUMONIA DUE TO COVID-19 VIRUS: Status: ACTIVE | Noted: 2020-08-25

## 2021-10-29 ENCOUNTER — APPOINTMENT (OUTPATIENT)
Dept: CARDIOLOGY | Facility: CLINIC | Age: 51
End: 2021-10-29
Payer: COMMERCIAL

## 2021-10-29 VITALS
WEIGHT: 306 LBS | HEART RATE: 64 BPM | SYSTOLIC BLOOD PRESSURE: 142 MMHG | DIASTOLIC BLOOD PRESSURE: 100 MMHG | BODY MASS INDEX: 45.32 KG/M2 | OXYGEN SATURATION: 98 % | HEIGHT: 69.09 IN | TEMPERATURE: 98.5 F

## 2021-10-29 DIAGNOSIS — G56.30 LESION OF RADIAL NERVE, UNSPECIFIED UPPER LIMB: ICD-10-CM

## 2021-10-29 DIAGNOSIS — M79.605 PAIN IN RIGHT LEG: ICD-10-CM

## 2021-10-29 DIAGNOSIS — M79.604 PAIN IN RIGHT LEG: ICD-10-CM

## 2021-10-29 DIAGNOSIS — R80.9 PROTEINURIA, UNSPECIFIED: ICD-10-CM

## 2021-10-29 PROCEDURE — 99214 OFFICE O/P EST MOD 30 MIN: CPT

## 2021-10-29 RX ORDER — ROSUVASTATIN CALCIUM 20 MG/1
20 TABLET, FILM COATED ORAL
Qty: 90 | Refills: 1 | Status: DISCONTINUED | COMMUNITY
Start: 2020-08-05 | End: 2021-10-29

## 2021-10-29 RX ORDER — SODIUM SULFATE, POTASSIUM SULFATE, MAGNESIUM SULFATE 17.5; 3.13; 1.6 G/ML; G/ML; G/ML
17.5-3.13-1.6 SOLUTION, CONCENTRATE ORAL
Qty: 1 | Refills: 0 | Status: DISCONTINUED | COMMUNITY
Start: 2021-01-07 | End: 2021-10-29

## 2021-10-29 NOTE — HISTORY OF PRESENT ILLNESS
[FreeTextEntry1] : This is a 51 year old gentlemen with a PMH of Afib, HTN, HLD, Anemia, and neuropathy who presents today for follow up. Patient states that he is still experiencing pain of his BL arms and legs.  pt denies any chest  pain dizziness ,lightheadedness ,nausea vomiting diaphoresis\par

## 2021-10-30 ENCOUNTER — APPOINTMENT (OUTPATIENT)
Dept: CARDIOLOGY | Facility: CLINIC | Age: 51
End: 2021-10-30
Payer: COMMERCIAL

## 2021-10-30 PROCEDURE — 93306 TTE W/DOPPLER COMPLETE: CPT

## 2021-11-24 ENCOUNTER — APPOINTMENT (OUTPATIENT)
Dept: ORTHOPEDIC SURGERY | Facility: CLINIC | Age: 51
End: 2021-11-24
Payer: COMMERCIAL

## 2021-11-24 VITALS
BODY MASS INDEX: 45.32 KG/M2 | HEIGHT: 69 IN | DIASTOLIC BLOOD PRESSURE: 81 MMHG | HEART RATE: 82 BPM | SYSTOLIC BLOOD PRESSURE: 119 MMHG | WEIGHT: 306 LBS

## 2021-11-24 DIAGNOSIS — Z83.511 FAMILY HISTORY OF GLAUCOMA: ICD-10-CM

## 2021-11-24 DIAGNOSIS — Z86.79 PERSONAL HISTORY OF OTHER DISEASES OF THE CIRCULATORY SYSTEM: ICD-10-CM

## 2021-11-24 DIAGNOSIS — Z77.22 CONTACT WITH AND (SUSPECTED) EXPOSURE TO ENVIRONMENTAL TOBACCO SMOKE (ACUTE) (CHRONIC): ICD-10-CM

## 2021-11-24 DIAGNOSIS — Z84.0 FAMILY HISTORY OF DISEASES OF THE SKIN AND SUBCUTANEOUS TISSUE: ICD-10-CM

## 2021-11-24 DIAGNOSIS — M15.2 BOUCHARD'S NODES (WITH ARTHROPATHY): ICD-10-CM

## 2021-11-24 DIAGNOSIS — M79.642 PAIN IN RIGHT HAND: ICD-10-CM

## 2021-11-24 DIAGNOSIS — Z86.718 PERSONAL HISTORY OF OTHER VENOUS THROMBOSIS AND EMBOLISM: ICD-10-CM

## 2021-11-24 DIAGNOSIS — Z82.49 FAMILY HISTORY OF ISCHEMIC HEART DISEASE AND OTHER DISEASES OF THE CIRCULATORY SYSTEM: ICD-10-CM

## 2021-11-24 DIAGNOSIS — M25.531 PAIN IN RIGHT WRIST: ICD-10-CM

## 2021-11-24 DIAGNOSIS — M79.641 PAIN IN RIGHT HAND: ICD-10-CM

## 2021-11-24 DIAGNOSIS — Z86.39 PERSONAL HISTORY OF OTHER ENDOCRINE, NUTRITIONAL AND METABOLIC DISEASE: ICD-10-CM

## 2021-11-24 DIAGNOSIS — M25.532 PAIN IN LEFT WRIST: ICD-10-CM

## 2021-11-24 DIAGNOSIS — M19.90 UNSPECIFIED OSTEOARTHRITIS, UNSPECIFIED SITE: ICD-10-CM

## 2021-11-24 PROCEDURE — 99214 OFFICE O/P EST MOD 30 MIN: CPT

## 2021-11-24 PROCEDURE — 73130 X-RAY EXAM OF HAND: CPT | Mod: 50

## 2021-11-24 PROCEDURE — 73110 X-RAY EXAM OF WRIST: CPT | Mod: 50

## 2021-11-24 NOTE — ADDENDUM
[FreeTextEntry1] : I, Sampson Osman, acted solely as a scribe for Dr. Jaffe on this date on 11/24/2021.

## 2021-11-24 NOTE — DISCUSSION/SUMMARY
[FreeTextEntry1] : He has complaints of bilateral hand numbness and tingling, status post a severe Covid infection in May 2021.\par \par I had a discussion with the patient regarding today's visit, the prognosis of this diagnosis, and treatment recommendations and options. At this time, I recommended EMGs to better evaluate for peripheral neuropathy and/or carpal tunnel syndrome.  He will follow-up after the EMGs to review the results and discuss treatment recommendations.\par \par The patient has agreed to the above plan of management and has expressed full understanding.  All questions were fully answered to the patient's satisfaction. \par \par My cumulative time spent on this visit included: Preparation for the visit, review of the medical records, review of pertinent diagnostic studies, examination and counseling of the patient on the above diagnosis, treatment plan and prognosis, orders of diagnostic tests, medication and/or appropriate procedures and documentation in the medical records of today's visit.

## 2021-11-24 NOTE — PHYSICAL EXAM
[de-identified] : - Constitutional: This is a male in no obvious distress.  \par - Psych: Patient is alert and oriented to person, place and time.  Patient has a normal mood and affect.\par - Cardiovascular: Normal pulses throughout the upper extremities.  No significant varicosities are noted in the upper extremities. \par - Respiratory:  Patient exhibits no evidence of shortness of breath or difficulty breathing.\par - Skin: No rashes, lesions, or other abnormalities are noted in the upper extremities.\par \par ---\par \par Examination of both wrist and hand demonstrates no acute swelling.  Examination of his right little finger demonstrates obvious arthritis at the PIP joint with a 30 degree PIP joint flexion contracture and swelling.  There is limited flexion.  He has limitation of flexion and extension of the digits into the palm, more notably on the right side.  There is no evidence of a trigger finger.  He has complaints of numbness throughout the digits.  Provocative signs for carpal tunnel syndrome are equivocal. 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  There is no evidence of significant arthritis in the other joints of the digits.

## 2021-11-24 NOTE — END OF VISIT
[FreeTextEntry3] : This note was written by Sampson Osman on 11/24/2021 acting solely as a scribe for Dr. Primitivo Jaffe.\par  \par All medical record entries made by the Scribe were at my, Dr. Primitivo Jaffe, direction and personally dictated by me on 11/24/2021. I have personally reviewed the chart and agree that the record accurately reflects my personal performance of the history, physical exam.

## 2021-11-24 NOTE — CONSULT LETTER
[Dear  ___] : Dear  [unfilled], [Consult Letter:] : I had the pleasure of evaluating your patient, [unfilled]. [Please see my note below.] : Please see my note below. [Consult Closing:] : Thank you very much for allowing me to participate in the care of this patient.  If you have any questions, please do not hesitate to contact me. [Sincerely,] : Sincerely, [FreeTextEntry3] : Primitivo Jaffe M.D.\par Surgery of the Hand & Upper Extremity\par Orthopaedic Surgery\par Chief, Hand Service, [facility]\par Director, Hand Service, [facility]\par  of Orthopedic Surgery, [facility] [employer] [facility] [employer] [facility] \par [facility] HospitalEmail: [email or web address]\par Office Phone: [phone]\par

## 2021-11-24 NOTE — HISTORY OF PRESENT ILLNESS
[Right] : right hand dominant [FreeTextEntry1] : He comes in today for evaluation of bilateral hand numbness and tingling as well as pain. He states that he had this since May of 2020. He states that he takes Tylenol as needed. \par \par He had severe COVID in the past. He has a history of atrial fibrillation. He state that he takes Eliquis for atrial fibrillation. \par \par He was referred by Dr. Aguilera.

## 2022-02-24 ENCOUNTER — NON-APPOINTMENT (OUTPATIENT)
Age: 52
End: 2022-02-24

## 2022-02-24 ENCOUNTER — APPOINTMENT (OUTPATIENT)
Dept: CARDIOLOGY | Facility: CLINIC | Age: 52
End: 2022-02-24
Payer: COMMERCIAL

## 2022-02-24 VITALS
HEART RATE: 82 BPM | OXYGEN SATURATION: 95 % | WEIGHT: 308 LBS | SYSTOLIC BLOOD PRESSURE: 130 MMHG | TEMPERATURE: 98.4 F | BODY MASS INDEX: 45.62 KG/M2 | HEIGHT: 69 IN | DIASTOLIC BLOOD PRESSURE: 90 MMHG

## 2022-02-24 DIAGNOSIS — R79.89 OTHER SPECIFIED ABNORMAL FINDINGS OF BLOOD CHEMISTRY: ICD-10-CM

## 2022-02-24 PROCEDURE — 93000 ELECTROCARDIOGRAM COMPLETE: CPT

## 2022-02-24 PROCEDURE — 99214 OFFICE O/P EST MOD 30 MIN: CPT

## 2022-02-24 RX ORDER — CALCIUM ACETATE 667 MG/1
667 CAPSULE ORAL
Refills: 0 | Status: DISCONTINUED | COMMUNITY
End: 2022-02-24

## 2022-02-24 RX ORDER — COLLAGENASE SANTYL 250 [ARB'U]/G
250 OINTMENT TOPICAL
Refills: 0 | Status: DISCONTINUED | COMMUNITY
End: 2022-02-24

## 2022-02-24 RX ORDER — FOLIC ACID/VIT B COMPLEX AND C 0.8 MG
TABLET ORAL
Refills: 0 | Status: DISCONTINUED | COMMUNITY
End: 2022-02-24

## 2022-02-24 RX ORDER — SENNOSIDES 8.6 MG
TABLET ORAL
Refills: 0 | Status: DISCONTINUED | COMMUNITY
End: 2022-02-24

## 2022-02-24 RX ORDER — DULOXETINE HYDROCHLORIDE 30 MG/1
30 CAPSULE, DELAYED RELEASE PELLETS ORAL
Qty: 90 | Refills: 1 | Status: DISCONTINUED | COMMUNITY
End: 2022-02-24

## 2022-02-24 RX ORDER — PREGABALIN 50 MG/1
50 CAPSULE ORAL
Qty: 30 | Refills: 0 | Status: DISCONTINUED | COMMUNITY
End: 2022-02-24

## 2022-02-24 RX ORDER — POLYETHYLENE GLYCOL 3350 17 G/17G
17 POWDER, FOR SOLUTION ORAL
Refills: 0 | Status: DISCONTINUED | COMMUNITY
End: 2022-02-24

## 2022-02-24 NOTE — HISTORY OF PRESENT ILLNESS
[FreeTextEntry1] : This is a 52 year male with a Pmhx of Covid infection requiring hospitalization for 3 months s/p intubation  s/p extubation, afib (on eliquis) hx of DVT (on eliquis) DM HTN HLD anemia who presents to the office for follow up\par \par pt reports on goig b/l nerve pain in hands and feet pt with rare dyspnea \par \par Pt denies any CP,  , heart palpitations, dizziness, abdominal pain N/V/D fever or chills\par

## 2022-04-11 ENCOUNTER — NON-APPOINTMENT (OUTPATIENT)
Age: 52
End: 2022-04-11

## 2022-04-12 ENCOUNTER — APPOINTMENT (OUTPATIENT)
Dept: CARDIOLOGY | Facility: CLINIC | Age: 52
End: 2022-04-12
Payer: COMMERCIAL

## 2022-04-12 PROCEDURE — 93015 CV STRESS TEST SUPVJ I&R: CPT

## 2022-04-12 PROCEDURE — 78452 HT MUSCLE IMAGE SPECT MULT: CPT

## 2022-04-12 PROCEDURE — A9500: CPT

## 2022-04-12 RX ORDER — REGADENOSON 0.08 MG/ML
0.4 INJECTION, SOLUTION INTRAVENOUS
Qty: 1 | Refills: 0 | Status: COMPLETED | OUTPATIENT
Start: 2022-04-12

## 2022-04-12 RX ADMIN — REGADENOSON 5 MG/5ML: 0.08 INJECTION, SOLUTION INTRAVENOUS at 00:00

## 2022-04-14 ENCOUNTER — NON-APPOINTMENT (OUTPATIENT)
Age: 52
End: 2022-04-14

## 2022-06-28 ENCOUNTER — APPOINTMENT (OUTPATIENT)
Dept: CARDIOLOGY | Facility: CLINIC | Age: 52
End: 2022-06-28
Payer: COMMERCIAL

## 2022-06-28 ENCOUNTER — NON-APPOINTMENT (OUTPATIENT)
Age: 52
End: 2022-06-28

## 2022-06-28 VITALS
HEIGHT: 69 IN | OXYGEN SATURATION: 96 % | BODY MASS INDEX: 46.65 KG/M2 | WEIGHT: 315 LBS | DIASTOLIC BLOOD PRESSURE: 90 MMHG | SYSTOLIC BLOOD PRESSURE: 120 MMHG | HEART RATE: 81 BPM | TEMPERATURE: 98.1 F

## 2022-06-28 DIAGNOSIS — R20.0 ANESTHESIA OF SKIN: ICD-10-CM

## 2022-06-28 DIAGNOSIS — R20.2 ANESTHESIA OF SKIN: ICD-10-CM

## 2022-06-28 DIAGNOSIS — M79.671 PAIN IN RIGHT FOOT: ICD-10-CM

## 2022-06-28 PROCEDURE — 99214 OFFICE O/P EST MOD 30 MIN: CPT

## 2022-06-28 PROCEDURE — 93000 ELECTROCARDIOGRAM COMPLETE: CPT

## 2022-06-28 RX ORDER — GABAPENTIN 100 MG/1
100 CAPSULE ORAL
Qty: 90 | Refills: 1 | Status: ACTIVE | COMMUNITY
Start: 2022-06-28 | End: 1900-01-01

## 2022-06-28 NOTE — PHYSICAL EXAM

## 2022-07-05 ENCOUNTER — APPOINTMENT (OUTPATIENT)
Dept: CARDIOLOGY | Facility: CLINIC | Age: 52
End: 2022-07-05

## 2022-07-05 PROCEDURE — 93970 EXTREMITY STUDY: CPT

## 2022-07-05 PROCEDURE — 93925 LOWER EXTREMITY STUDY: CPT

## 2022-08-31 ENCOUNTER — NON-APPOINTMENT (OUTPATIENT)
Age: 52
End: 2022-08-31

## 2022-09-26 NOTE — DISCHARGE NOTE ADULT - CARE PROVIDERS DIRECT ADDRESSES
,DirectAddress_Unknown ,DirectAddress_Unknown,[email or web address] [employer] Preamble Text (Leave Blank If You Do Not Want): Undermining was performed with blunt dissection.

## 2022-10-28 ENCOUNTER — APPOINTMENT (OUTPATIENT)
Dept: CARDIOLOGY | Facility: CLINIC | Age: 52
End: 2022-10-28

## 2022-10-28 ENCOUNTER — NON-APPOINTMENT (OUTPATIENT)
Age: 52
End: 2022-10-28

## 2022-10-28 ENCOUNTER — RESULT CHARGE (OUTPATIENT)
Age: 52
End: 2022-10-28

## 2022-10-28 ENCOUNTER — APPOINTMENT (OUTPATIENT)
Dept: ENDOCRINOLOGY | Facility: CLINIC | Age: 52
End: 2022-10-28
Payer: COMMERCIAL

## 2022-10-28 VITALS
HEART RATE: 85 BPM | TEMPERATURE: 98.4 F | DIASTOLIC BLOOD PRESSURE: 82 MMHG | WEIGHT: 315 LBS | BODY MASS INDEX: 46.65 KG/M2 | OXYGEN SATURATION: 95 % | RESPIRATION RATE: 18 BRPM | SYSTOLIC BLOOD PRESSURE: 126 MMHG | HEIGHT: 69 IN

## 2022-10-28 VITALS
SYSTOLIC BLOOD PRESSURE: 126 MMHG | HEART RATE: 85 BPM | BODY MASS INDEX: 46.65 KG/M2 | DIASTOLIC BLOOD PRESSURE: 82 MMHG | OXYGEN SATURATION: 95 % | TEMPERATURE: 98.4 F | HEIGHT: 69 IN | WEIGHT: 315 LBS

## 2022-10-28 LAB
GLUCOSE BLDC GLUCOMTR-MCNC: 170
HBA1C MFR BLD HPLC: 7.8

## 2022-10-28 PROCEDURE — 99204 OFFICE O/P NEW MOD 45 MIN: CPT

## 2022-10-28 PROCEDURE — 93000 ELECTROCARDIOGRAM COMPLETE: CPT

## 2022-10-28 PROCEDURE — 99214 OFFICE O/P EST MOD 30 MIN: CPT | Mod: 25

## 2022-10-28 RX ORDER — METFORMIN HYDROCHLORIDE 500 MG/1
500 TABLET, COATED ORAL
Qty: 90 | Refills: 1 | Status: DISCONTINUED | COMMUNITY
Start: 2022-10-28 | End: 2022-10-28

## 2022-10-28 NOTE — HISTORY OF PRESENT ILLNESS
[FreeTextEntry1] : This is a 52 year male with a Pmhx of Covid s/p intubation/extubation afib (on eliquis) hx of DVT (on eliquis) DM HTN HLD anemia who presents to the office for follow up. pt reports feeling well Denies any complaints \par \par Pt denies any CP, SOB, heart palpitations, dizziness, abdominal pain N/V/D fever or chills\par

## 2022-10-28 NOTE — PHYSICAL EXAM
[Alert] : alert [Well Nourished] : well nourished [Obese] : obese [No Acute Distress] : no acute distress [Well Developed] : well developed [Normal Sclera/Conjunctiva] : normal sclera/conjunctiva [EOMI] : extra ocular movement intact [No Proptosis] : no proptosis [Normal Oropharynx] : the oropharynx was normal [Thyroid Not Enlarged] : the thyroid was not enlarged [No Thyroid Nodules] : no palpable thyroid nodules [No Respiratory Distress] : no respiratory distress [No Accessory Muscle Use] : no accessory muscle use [Clear to Auscultation] : lungs were clear to auscultation bilaterally [Normal S1, S2] : normal S1 and S2 [Normal Rate] : heart rate was normal [Regular Rhythm] : with a regular rhythm [No Edema] : no peripheral edema [Pedal Pulses Normal] : the pedal pulses are present [Normal Bowel Sounds] : normal bowel sounds [Not Tender] : non-tender [Not Distended] : not distended [Soft] : abdomen soft [Normal Anterior Cervical Nodes] : no anterior cervical lymphadenopathy [Normal Posterior Cervical Nodes] : no posterior cervical lymphadenopathy [No Spinal Tenderness] : no spinal tenderness [Spine Straight] : spine straight [No Stigmata of Cushings Syndrome] : no stigmata of Cushings Syndrome [Normal Gait] : normal gait [Normal Strength/Tone] : muscle strength and tone were normal [No Rash] : no rash [Acanthosis Nigricans] : no acanthosis nigricans [Normal] : normal [1+] : 1+ in the posterior tibialis [2+] : 2+ in the dorsalis pedis [Normal Reflexes] : deep tendon reflexes were 2+ and symmetric [No Tremors] : no tremors [Oriented x3] : oriented to person, place, and time [FreeTextEntry1] : callus formation heel [FreeTextEntry5] : callus formation heel

## 2022-10-28 NOTE — HISTORY OF PRESENT ILLNESS
[FreeTextEntry1] : 52 year M here for assessment for Type 2 diabetes mellitus\par \par Self-reported weight gain; reports recent hospitalization for COVID \par Thirst and frequent urination: yes\par Dry skin: yes\par Vision problems: stable \par Burning pain or tingling in the feet, legs, hands, other areas:  manageable, reports saw podiatry, had heel ulcer while hospitalized \par High blood pressure: no \par Extreme hunger: no \par Frequent and/or recurring urinary infections: no \par Skin infections:  no  \par \par  \par Screening \par Ophthalmology: follows\par Podiatry: follows\par LDL:  209, prescribed statin   \par EGFR: 62\par \par \par Current diabetic medication regimen (verified with patient): none\par Previously on glipizide, however stopped >1 year ago. Was prescribed metformin today by PCP, however patient did not want to take given potential adverse effects \par \par \par \par SMBG ranges (glucometer): not checked, does not have glucometer \par \par

## 2022-11-18 ENCOUNTER — OUTPATIENT (OUTPATIENT)
Dept: OUTPATIENT SERVICES | Facility: HOSPITAL | Age: 52
LOS: 1 days | End: 2022-11-18
Payer: SELF-PAY

## 2022-11-18 ENCOUNTER — APPOINTMENT (OUTPATIENT)
Dept: CT IMAGING | Facility: CLINIC | Age: 52
End: 2022-11-18

## 2022-11-18 DIAGNOSIS — Z98.890 OTHER SPECIFIED POSTPROCEDURAL STATES: Chronic | ICD-10-CM

## 2022-11-18 DIAGNOSIS — E78.5 HYPERLIPIDEMIA, UNSPECIFIED: ICD-10-CM

## 2022-11-18 PROCEDURE — 75571 CT HRT W/O DYE W/CA TEST: CPT

## 2022-11-18 PROCEDURE — 75571 CT HRT W/O DYE W/CA TEST: CPT | Mod: 26

## 2022-11-25 LAB
BASOPHILS # BLD AUTO: 0.04 K/UL
BASOPHILS NFR BLD AUTO: 0.6 %
EOSINOPHIL # BLD AUTO: 0.1 K/UL
EOSINOPHIL NFR BLD AUTO: 1.5 %
HCT VFR BLD CALC: 47.8 %
HGB BLD-MCNC: 15.6 G/DL
IMM GRANULOCYTES NFR BLD AUTO: 0.3 %
LYMPHOCYTES # BLD AUTO: 2.37 K/UL
LYMPHOCYTES NFR BLD AUTO: 34.5 %
MAN DIFF?: NORMAL
MCHC RBC-ENTMCNC: 30.4 PG
MCHC RBC-ENTMCNC: 32.6 GM/DL
MCV RBC AUTO: 93 FL
MONOCYTES # BLD AUTO: 0.7 K/UL
MONOCYTES NFR BLD AUTO: 10.2 %
NEUTROPHILS # BLD AUTO: 3.64 K/UL
NEUTROPHILS NFR BLD AUTO: 52.9 %
PLATELET # BLD AUTO: 214 K/UL
RBC # BLD: 5.14 M/UL
RBC # FLD: 12.3 %
WBC # FLD AUTO: 6.87 K/UL

## 2022-12-07 ENCOUNTER — APPOINTMENT (OUTPATIENT)
Dept: CARDIOLOGY | Facility: CLINIC | Age: 52
End: 2022-12-07

## 2022-12-07 ENCOUNTER — APPOINTMENT (OUTPATIENT)
Dept: ENDOCRINOLOGY | Facility: CLINIC | Age: 52
End: 2022-12-07

## 2022-12-20 ENCOUNTER — NON-APPOINTMENT (OUTPATIENT)
Age: 52
End: 2022-12-20

## 2022-12-28 ENCOUNTER — NON-APPOINTMENT (OUTPATIENT)
Age: 52
End: 2022-12-28

## 2022-12-28 ENCOUNTER — APPOINTMENT (OUTPATIENT)
Dept: CARDIOLOGY | Facility: CLINIC | Age: 52
End: 2022-12-28

## 2022-12-28 ENCOUNTER — APPOINTMENT (OUTPATIENT)
Dept: ENDOCRINOLOGY | Facility: CLINIC | Age: 52
End: 2022-12-28

## 2022-12-28 VITALS
HEIGHT: 69 IN | TEMPERATURE: 98.4 F | BODY MASS INDEX: 46.06 KG/M2 | SYSTOLIC BLOOD PRESSURE: 140 MMHG | WEIGHT: 311 LBS | OXYGEN SATURATION: 96 % | DIASTOLIC BLOOD PRESSURE: 100 MMHG | HEART RATE: 92 BPM

## 2022-12-28 VITALS
SYSTOLIC BLOOD PRESSURE: 138 MMHG | DIASTOLIC BLOOD PRESSURE: 82 MMHG | HEART RATE: 88 BPM | BODY MASS INDEX: 46.65 KG/M2 | WEIGHT: 315 LBS | TEMPERATURE: 98.5 F | OXYGEN SATURATION: 94 % | HEIGHT: 69 IN

## 2022-12-28 DIAGNOSIS — R79.89 OTHER SPECIFIED ABNORMAL FINDINGS OF BLOOD CHEMISTRY: ICD-10-CM

## 2022-12-28 DIAGNOSIS — R59.9 ENLARGED LYMPH NODES, UNSPECIFIED: ICD-10-CM

## 2022-12-28 LAB
GLUCOSE BLDC GLUCOMTR-MCNC: 120
HBA1C MFR BLD HPLC: 8.5

## 2022-12-28 PROCEDURE — 93000 ELECTROCARDIOGRAM COMPLETE: CPT

## 2022-12-28 PROCEDURE — 99214 OFFICE O/P EST MOD 30 MIN: CPT

## 2022-12-28 PROCEDURE — 82962 GLUCOSE BLOOD TEST: CPT

## 2022-12-28 PROCEDURE — 99214 OFFICE O/P EST MOD 30 MIN: CPT | Mod: 25

## 2022-12-28 PROCEDURE — 83036 HEMOGLOBIN GLYCOSYLATED A1C: CPT | Mod: QW

## 2022-12-28 NOTE — HISTORY OF PRESENT ILLNESS
[FreeTextEntry1] : 52 year M here for assessment for Type 2 diabetes mellitus\par \par  \par Screening \par Ophthalmology: follows\par Podiatry: follows\par LDL:  209, prescribed statin   \par EGFR: 62\par \par \par Current diabetic medication regimen (verified with patient): . Was prescribed metformin prior by PCP, however patient did not want to take given potential adverse effects \par \par \par at last visit represcribed glipizide ER 2.5mg po daily given his preference for this medication \par \par \par \par SMBG ranges (glucometer): \par avmg/dl\par highest: 211mg/dl\par lowest 137mg/dl\par \par am avmg/dl\par night: 158mg/dl \par \par \par No recent reported hypoglycemia \par

## 2022-12-28 NOTE — HISTORY OF PRESENT ILLNESS
[FreeTextEntry1] : This is a 52 year male with a Pmhx of Covid s/p intubation/extubation afib (on eliquis) hx of DVT (on eliquis) DM HTN HLD anemia who presents to the office for follow up. pt reports feeling well Denies any complaints \par pt reports he takes his statin "when he remembers" \par \par Pt denies any CP, SOB, heart palpitations, dizziness, abdominal pain N/V/D fever or chills

## 2022-12-28 NOTE — PHYSICAL EXAM
[Alert] : alert [Well Nourished] : well nourished [Obese] : obese [No Acute Distress] : no acute distress [Well Developed] : well developed [Normal Sclera/Conjunctiva] : normal sclera/conjunctiva [EOMI] : extra ocular movement intact [No Proptosis] : no proptosis [Normal Oropharynx] : the oropharynx was normal [Thyroid Not Enlarged] : the thyroid was not enlarged [No Thyroid Nodules] : no palpable thyroid nodules [No Respiratory Distress] : no respiratory distress [No Accessory Muscle Use] : no accessory muscle use [Clear to Auscultation] : lungs were clear to auscultation bilaterally [Normal S1, S2] : normal S1 and S2 [Normal Rate] : heart rate was normal [Regular Rhythm] : with a regular rhythm [No Edema] : no peripheral edema [Pedal Pulses Normal] : the pedal pulses are present [Normal Bowel Sounds] : normal bowel sounds [Not Tender] : non-tender [Not Distended] : not distended [Soft] : abdomen soft [Normal Anterior Cervical Nodes] : no anterior cervical lymphadenopathy [No Spinal Tenderness] : no spinal tenderness [Spine Straight] : spine straight [No Stigmata of Cushings Syndrome] : no stigmata of Cushings Syndrome [Normal Gait] : normal gait [Normal Strength/Tone] : muscle strength and tone were normal [No Rash] : no rash [Normal] : normal [1+] : 1+ in the posterior tibialis [2+] : 2+ in the dorsalis pedis [Normal Reflexes] : deep tendon reflexes were 2+ and symmetric [No Tremors] : no tremors [Oriented x3] : oriented to person, place, and time [Acanthosis Nigricans] : no acanthosis nigricans [FreeTextEntry1] : callus formation heel [FreeTextEntry5] : callus formation heel

## 2023-01-27 ENCOUNTER — NON-APPOINTMENT (OUTPATIENT)
Age: 53
End: 2023-01-27

## 2023-01-27 ENCOUNTER — APPOINTMENT (OUTPATIENT)
Dept: SURGICAL ONCOLOGY | Facility: CLINIC | Age: 53
End: 2023-01-27
Payer: COMMERCIAL

## 2023-01-27 VITALS
SYSTOLIC BLOOD PRESSURE: 151 MMHG | RESPIRATION RATE: 17 BRPM | HEART RATE: 89 BPM | WEIGHT: 315 LBS | HEIGHT: 69 IN | BODY MASS INDEX: 46.65 KG/M2 | OXYGEN SATURATION: 97 % | DIASTOLIC BLOOD PRESSURE: 99 MMHG

## 2023-01-27 DIAGNOSIS — R59.0 LOCALIZED ENLARGED LYMPH NODES: ICD-10-CM

## 2023-01-27 PROCEDURE — 99204 OFFICE O/P NEW MOD 45 MIN: CPT

## 2023-01-27 NOTE — HISTORY OF PRESENT ILLNESS
[de-identified] : Patient is a 52 y/o male who presents an initial consultation for enlarged lymph nodes. He was referred by Dr. Gonsalo Aguilera. Denies any fever, chills or night sweats. \par \par Patient did not undergo official ultrasound but nonspecific bilateral axillary lymph nodes with the largest one in the right which was partially imaged measuring about 1.1 cm seen on heart calcium score from 11/18/22. \par \par PMHx: DM, Hyperlipidemia, DVT, Covid vaccinated in left arm \par Maternal aunt had breast cancer. Maternal great aunt had uterine cancer.

## 2023-01-27 NOTE — PHYSICAL EXAM
[Normal] : supple, no neck mass and thyroid not enlarged [Normal Neck Lymph Nodes] : normal neck lymph nodes  [Normal Supraclavicular Lymph Nodes] : normal supraclavicular lymph nodes [Normal Groin Lymph Nodes] : normal groin lymph nodes [Normal Axillary Lymph Nodes] : normal axillary lymph nodes [Normal] : oriented to person, place and time, with appropriate affect [de-identified] :  no cervical or axillary adenopathy. us shows 1 cm right axillary lymph nodes w/ normal architecture.

## 2023-01-27 NOTE — CONSULT LETTER
[Dear  ___] : Dear  [unfilled], [Consult Letter:] : I had the pleasure of evaluating your patient, [unfilled]. [Please see my note below.] : Please see my note below. [Sincerely,] : Sincerely, [FreeTextEntry3] : Baldo Clemens MD FACS\par

## 2023-01-27 NOTE — ASSESSMENT
[FreeTextEntry1] : Right axillary lymphadenopathy-  incidental finding on coronary CT scan\par Clinically nodes are benign on exam and office ultrasound\par Reassured patient index of suspicion for malignancy is low\par RTO prn

## 2023-01-27 NOTE — ADDENDUM
[FreeTextEntry1] : I, Elvira Schofield, acted solely as a scribe for Dr. Baldo Clemens on this date 01/27/2023.\par \par

## 2023-02-27 ENCOUNTER — APPOINTMENT (OUTPATIENT)
Dept: ENDOCRINOLOGY | Facility: CLINIC | Age: 53
End: 2023-02-27
Payer: COMMERCIAL

## 2023-02-27 VITALS
WEIGHT: 315 LBS | TEMPERATURE: 98.5 F | DIASTOLIC BLOOD PRESSURE: 78 MMHG | OXYGEN SATURATION: 98 % | HEART RATE: 101 BPM | SYSTOLIC BLOOD PRESSURE: 125 MMHG | BODY MASS INDEX: 46.65 KG/M2 | HEIGHT: 69 IN

## 2023-02-27 LAB — GLUCOSE BLDC GLUCOMTR-MCNC: 150

## 2023-02-27 PROCEDURE — 99214 OFFICE O/P EST MOD 30 MIN: CPT

## 2023-02-27 PROCEDURE — 82962 GLUCOSE BLOOD TEST: CPT

## 2023-02-27 RX ORDER — BLOOD-GLUCOSE METER
70 EACH MISCELLANEOUS
Qty: 360 | Refills: 2 | Status: ACTIVE | COMMUNITY
Start: 2022-10-28 | End: 1900-01-01

## 2023-02-27 RX ORDER — GLUCOSAM/CHON-MSM1/C/MANG/BOSW 500-416.6
TABLET ORAL
Qty: 1 | Refills: 0 | Status: ACTIVE | COMMUNITY
Start: 2022-10-28 | End: 1900-01-01

## 2023-02-27 NOTE — HISTORY OF PRESENT ILLNESS
[FreeTextEntry1] : 52 year M here for f/up for Type 2 diabetes mellitus\par \par no new complaints\par \par At last visit was started on Rybelsus 3mg po daily. No nausea, no vomiting, no abdominal pain. \par \par  \par Screening \par Ophthalmology: due \par Podiatry: follows\par LDL:  209, prescribed statin   \par EGFR: 62\par \par Current diabetic medication regimen (verified with patient):\par glipizide ER 2.5mg po daily \par rybelsus 3mg po daily \par \par Prior intolerance to metformin (dizziness, diarrhea) \par \par SMBG ranges (glucometer): reported\par \par am: >128 -150 avg\par PP: <200 avg\par \par \par No recent reported hypoglycemia \par

## 2023-02-27 NOTE — PHYSICAL EXAM
[Alert] : alert [Well Nourished] : well nourished [Obese] : obese [No Acute Distress] : no acute distress [Well Developed] : well developed [Normal Sclera/Conjunctiva] : normal sclera/conjunctiva [EOMI] : extra ocular movement intact [No Proptosis] : no proptosis [Normal Oropharynx] : the oropharynx was normal [Thyroid Not Enlarged] : the thyroid was not enlarged [No Respiratory Distress] : no respiratory distress [No Accessory Muscle Use] : no accessory muscle use [Clear to Auscultation] : lungs were clear to auscultation bilaterally [Normal S1, S2] : normal S1 and S2 [Normal Rate] : heart rate was normal [Regular Rhythm] : with a regular rhythm [No Edema] : no peripheral edema [Pedal Pulses Normal] : the pedal pulses are present [Normal Bowel Sounds] : normal bowel sounds [Not Tender] : non-tender [Not Distended] : not distended [Soft] : abdomen soft [Normal Anterior Cervical Nodes] : no anterior cervical lymphadenopathy [No Spinal Tenderness] : no spinal tenderness [Spine Straight] : spine straight [No Stigmata of Cushings Syndrome] : no stigmata of Cushings Syndrome [Normal Gait] : normal gait [Normal Strength/Tone] : muscle strength and tone were normal [No Rash] : no rash [Acanthosis Nigricans] : no acanthosis nigricans [Normal] : normal [1+] : 1+ in the posterior tibialis [2+] : 2+ in the dorsalis pedis [Normal Reflexes] : deep tendon reflexes were 2+ and symmetric [No Tremors] : no tremors [Oriented x3] : oriented to person, place, and time [FreeTextEntry1] : callus formation heel [FreeTextEntry5] : callus formation heel

## 2023-03-03 ENCOUNTER — RX RENEWAL (OUTPATIENT)
Age: 53
End: 2023-03-03

## 2023-05-04 ENCOUNTER — APPOINTMENT (OUTPATIENT)
Dept: CARDIOLOGY | Facility: CLINIC | Age: 53
End: 2023-05-04

## 2023-05-05 ENCOUNTER — NON-APPOINTMENT (OUTPATIENT)
Age: 53
End: 2023-05-05

## 2023-05-05 ENCOUNTER — APPOINTMENT (OUTPATIENT)
Dept: CARDIOLOGY | Facility: CLINIC | Age: 53
End: 2023-05-05
Payer: COMMERCIAL

## 2023-05-05 VITALS
SYSTOLIC BLOOD PRESSURE: 130 MMHG | HEIGHT: 69 IN | HEART RATE: 70 BPM | BODY MASS INDEX: 46.51 KG/M2 | DIASTOLIC BLOOD PRESSURE: 100 MMHG | WEIGHT: 314 LBS | TEMPERATURE: 98.7 F | OXYGEN SATURATION: 97 %

## 2023-05-05 DIAGNOSIS — G62.9 POLYNEUROPATHY, UNSPECIFIED: ICD-10-CM

## 2023-05-05 PROCEDURE — 99214 OFFICE O/P EST MOD 30 MIN: CPT | Mod: 25

## 2023-05-05 PROCEDURE — 93000 ELECTROCARDIOGRAM COMPLETE: CPT

## 2023-05-05 NOTE — PHYSICAL EXAM
[Well Developed] : well developed [Well Nourished] : well nourished [No Acute Distress] : no acute distress [Normal Conjunctiva] : normal conjunctiva [Normal Venous Pressure] : normal venous pressure [No Carotid Bruit] : no carotid bruit [Normal S1, S2] : normal S1, S2 [No Murmur] : no murmur [No Rub] : no rub [No Gallop] : no gallop [Clear Lung Fields] : clear lung fields [Good Air Entry] : good air entry [No Respiratory Distress] : no respiratory distress  [Soft] : abdomen soft [Non Tender] : non-tender [No Masses/organomegaly] : no masses/organomegaly [Normal Bowel Sounds] : normal bowel sounds [Normal Gait] : normal gait [No Edema] : no edema [No Clubbing] : no clubbing [No Cyanosis] : no cyanosis [No Varicosities] : no varicosities [No Rash] : no rash [Moves all extremities] : moves all extremities [No Skin Lesions] : no skin lesions [No Focal Deficits] : no focal deficits [Normal Speech] : normal speech [Alert and Oriented] : alert and oriented [Normal memory] : normal memory

## 2023-05-05 NOTE — HISTORY OF PRESENT ILLNESS
[FreeTextEntry1] : This is a 53 year male with a Pmhx of Covid s/p intubation/extubation afib (on eliquis) hx of DVT (on eliquis) DM HTN HLD anemia who presents to the office for follow up. pt reports feeling well Denies any complaints \par \par Pt denies any CP, SOB, heart palpitations, dizziness, abdominal pain N/V/D fever or chills \par

## 2023-05-19 ENCOUNTER — APPOINTMENT (OUTPATIENT)
Dept: ENDOCRINOLOGY | Facility: CLINIC | Age: 53
End: 2023-05-19
Payer: COMMERCIAL

## 2023-05-19 VITALS
BODY MASS INDEX: 46.65 KG/M2 | SYSTOLIC BLOOD PRESSURE: 128 MMHG | OXYGEN SATURATION: 97 % | HEART RATE: 75 BPM | HEIGHT: 69 IN | DIASTOLIC BLOOD PRESSURE: 80 MMHG | TEMPERATURE: 97.5 F | WEIGHT: 315 LBS

## 2023-05-19 PROCEDURE — 99214 OFFICE O/P EST MOD 30 MIN: CPT

## 2023-05-19 NOTE — HISTORY OF PRESENT ILLNESS
[FreeTextEntry1] : 52 year M here for f/up for Type 2 diabetes mellitus\par \par no new complaints\par \par  \par Screening \par Podiatry: follows\par LDL:  209, prescribed statin   \par EGFR: 66\par microalbumin: cr: -ve\par \par Current diabetic medication regimen (verified with patient):\par glipizide ER 2.5mg po daily \par rybelsus 7mg po daily \par \par Prior intolerance to metformin (dizziness, diarrhea) \par \par SMBG ranges (glucometer): reported\par \par am: <130 avg\par \par \par No recent reported hypoglycemia \par

## 2023-05-19 NOTE — PHYSICAL EXAM
[Alert] : alert [Well Nourished] : well nourished [No Acute Distress] : no acute distress [Well Developed] : well developed [Normal Sclera/Conjunctiva] : normal sclera/conjunctiva [EOMI] : extra ocular movement intact [No Proptosis] : no proptosis [Normal Oropharynx] : the oropharynx was normal [Thyroid Not Enlarged] : the thyroid was not enlarged [No Respiratory Distress] : no respiratory distress [No Accessory Muscle Use] : no accessory muscle use [Clear to Auscultation] : lungs were clear to auscultation bilaterally [Normal S1, S2] : normal S1 and S2 [Normal Rate] : heart rate was normal [Regular Rhythm] : with a regular rhythm [No Edema] : no peripheral edema [Pedal Pulses Normal] : the pedal pulses are present [Normal Bowel Sounds] : normal bowel sounds [Not Tender] : non-tender [Not Distended] : not distended [Soft] : abdomen soft [Normal Anterior Cervical Nodes] : no anterior cervical lymphadenopathy [Normal Posterior Cervical Nodes] : no posterior cervical lymphadenopathy [No Spinal Tenderness] : no spinal tenderness [Spine Straight] : spine straight [No Stigmata of Cushings Syndrome] : no stigmata of Cushings Syndrome [Normal Strength/Tone] : muscle strength and tone were normal [Normal Gait] : normal gait [No Rash] : no rash [Normal Reflexes] : deep tendon reflexes were 2+ and symmetric [No Tremors] : no tremors [Oriented x3] : oriented to person, place, and time [Acanthosis Nigricans] : no acanthosis nigricans

## 2023-05-22 RX ORDER — BLOOD-GLUCOSE METER
W/DEVICE EACH MISCELLANEOUS
Qty: 1 | Refills: 0 | Status: ACTIVE | COMMUNITY
Start: 2022-10-28 | End: 1900-01-01

## 2023-05-22 RX ORDER — LANCETS
EACH MISCELLANEOUS
Qty: 1 | Refills: 2 | Status: ACTIVE | COMMUNITY
Start: 2022-10-28 | End: 1900-01-01

## 2023-05-22 RX ORDER — BLOOD SUGAR DIAGNOSTIC
STRIP MISCELLANEOUS
Qty: 2 | Refills: 2 | Status: ACTIVE | COMMUNITY
Start: 2022-10-28 | End: 1900-01-01

## 2023-09-12 NOTE — DISCHARGE NOTE ADULT - NSTOBACCOWEBSITE_GEN_A_NCS
[place] Website [place] [email or web address] website [place] [email or web address] Notified Halle Alvarez. Obtain EKG per Kim MD Alvarez made aware/IV Tylenol to be given for pain/Monitor/PP/RN Cover blood glucose w/ SQ insulin per order. No insulin gtt at this time. Post-Care Instructions: I reviewed with the patient in detail post-care instructions. Patient is to wear sunprotection, and avoid picking at any of the treated lesions. Pt may apply Vaseline to crusted or scabbing areas. BC x2, CBC/BMP, UA/UC, covid swab, chest and abdominal XRAY ordered. after collecting bloodwork, start Zosyn.

## 2023-09-22 ENCOUNTER — APPOINTMENT (OUTPATIENT)
Dept: ENDOCRINOLOGY | Facility: CLINIC | Age: 53
End: 2023-09-22
Payer: COMMERCIAL

## 2023-09-22 ENCOUNTER — NON-APPOINTMENT (OUTPATIENT)
Age: 53
End: 2023-09-22

## 2023-09-22 ENCOUNTER — APPOINTMENT (OUTPATIENT)
Dept: CARDIOLOGY | Facility: CLINIC | Age: 53
End: 2023-09-22
Payer: COMMERCIAL

## 2023-09-22 VITALS
TEMPERATURE: 97.6 F | OXYGEN SATURATION: 97 % | BODY MASS INDEX: 46.65 KG/M2 | WEIGHT: 315 LBS | HEIGHT: 69 IN | DIASTOLIC BLOOD PRESSURE: 80 MMHG | HEART RATE: 67 BPM | SYSTOLIC BLOOD PRESSURE: 130 MMHG

## 2023-09-22 VITALS
HEIGHT: 69 IN | TEMPERATURE: 97.6 F | SYSTOLIC BLOOD PRESSURE: 130 MMHG | RESPIRATION RATE: 18 BRPM | DIASTOLIC BLOOD PRESSURE: 80 MMHG | HEART RATE: 67 BPM | BODY MASS INDEX: 46.65 KG/M2 | OXYGEN SATURATION: 97 % | WEIGHT: 315 LBS

## 2023-09-22 DIAGNOSIS — Z12.11 ENCOUNTER FOR SCREENING FOR MALIGNANT NEOPLASM OF COLON: ICD-10-CM

## 2023-09-22 DIAGNOSIS — H04.209 UNSPECIFIED EPIPHORA, UNSPECIFIED SIDE: ICD-10-CM

## 2023-09-22 DIAGNOSIS — Z12.5 ENCOUNTER FOR SCREENING FOR MALIGNANT NEOPLASM OF PROSTATE: ICD-10-CM

## 2023-09-22 LAB — HBA1C MFR BLD HPLC: 7.6

## 2023-09-22 PROCEDURE — 93000 ELECTROCARDIOGRAM COMPLETE: CPT

## 2023-09-22 PROCEDURE — 99214 OFFICE O/P EST MOD 30 MIN: CPT | Mod: 25

## 2023-09-22 PROCEDURE — 99214 OFFICE O/P EST MOD 30 MIN: CPT

## 2023-09-22 PROCEDURE — 83036 HEMOGLOBIN GLYCOSYLATED A1C: CPT | Mod: QW

## 2023-09-23 ENCOUNTER — APPOINTMENT (OUTPATIENT)
Dept: RADIOLOGY | Facility: IMAGING CENTER | Age: 53
End: 2023-09-23
Payer: COMMERCIAL

## 2023-09-23 ENCOUNTER — OUTPATIENT (OUTPATIENT)
Dept: OUTPATIENT SERVICES | Facility: HOSPITAL | Age: 53
LOS: 1 days | End: 2023-09-23
Payer: COMMERCIAL

## 2023-09-23 DIAGNOSIS — Z00.8 ENCOUNTER FOR OTHER GENERAL EXAMINATION: ICD-10-CM

## 2023-09-23 DIAGNOSIS — R06.02 SHORTNESS OF BREATH: ICD-10-CM

## 2023-09-23 DIAGNOSIS — Z98.890 OTHER SPECIFIED POSTPROCEDURAL STATES: Chronic | ICD-10-CM

## 2023-09-23 PROCEDURE — 71046 X-RAY EXAM CHEST 2 VIEWS: CPT

## 2023-09-23 PROCEDURE — 71046 X-RAY EXAM CHEST 2 VIEWS: CPT | Mod: 26

## 2023-09-25 LAB
ALBUMIN SERPL ELPH-MCNC: 4.2 G/DL
ALP BLD-CCNC: 106 U/L
ALT SERPL-CCNC: 12 U/L
ANION GAP SERPL CALC-SCNC: 13 MMOL/L
APPEARANCE: CLEAR
AST SERPL-CCNC: 18 U/L
BACTERIA: NEGATIVE /HPF
BASOPHILS # BLD AUTO: 0.04 K/UL
BASOPHILS NFR BLD AUTO: 0.7 %
BILIRUB DIRECT SERPL-MCNC: 0.1 MG/DL
BILIRUB INDIRECT SERPL-MCNC: 0.4 MG/DL
BILIRUB SERPL-MCNC: 0.5 MG/DL
BILIRUBIN URINE: NEGATIVE
BLOOD URINE: NEGATIVE
BUN SERPL-MCNC: 14 MG/DL
CALCIUM SERPL-MCNC: 9.7 MG/DL
CAST: 0 /LPF
CHLORIDE SERPL-SCNC: 103 MMOL/L
CHOLEST SERPL-MCNC: 252 MG/DL
CK SERPL-CCNC: 312 U/L
CO2 SERPL-SCNC: 26 MMOL/L
COLOR: YELLOW
CREAT SERPL-MCNC: 1.3 MG/DL
CREAT SPEC-SCNC: 171 MG/DL
EGFR: 65 ML/MIN/1.73M2
EOSINOPHIL # BLD AUTO: 0.09 K/UL
EOSINOPHIL NFR BLD AUTO: 1.6 %
EPITHELIAL CELLS: 0 /HPF
ESTIMATED AVERAGE GLUCOSE: 183 MG/DL
FERRITIN SERPL-MCNC: 312 NG/ML
FOLATE SERPL-MCNC: 16.6 NG/ML
GLUCOSE QUALITATIVE U: NEGATIVE MG/DL
GLUCOSE SERPL-MCNC: 82 MG/DL
HBA1C MFR BLD HPLC: 8 %
HCT VFR BLD CALC: 49.4 %
HDLC SERPL-MCNC: 40 MG/DL
HGB BLD-MCNC: 15.2 G/DL
IMM GRANULOCYTES NFR BLD AUTO: 0.2 %
IRON SATN MFR SERPL: 19 %
IRON SERPL-MCNC: 65 UG/DL
KETONES URINE: NEGATIVE MG/DL
LDLC SERPL CALC-MCNC: 187 MG/DL
LEUKOCYTE ESTERASE URINE: NEGATIVE
LYMPHOCYTES # BLD AUTO: 2.18 K/UL
LYMPHOCYTES NFR BLD AUTO: 37.7 %
MAGNESIUM SERPL-MCNC: 2 MG/DL
MAN DIFF?: NORMAL
MCHC RBC-ENTMCNC: 29.7 PG
MCHC RBC-ENTMCNC: 30.8 GM/DL
MCV RBC AUTO: 96.5 FL
MICROALBUMIN 24H UR DL<=1MG/L-MCNC: 2.5 MG/DL
MICROALBUMIN/CREAT 24H UR-RTO: 15 MG/G
MICROSCOPIC-UA: NORMAL
MONOCYTES # BLD AUTO: 0.59 K/UL
MONOCYTES NFR BLD AUTO: 10.2 %
NEUTROPHILS # BLD AUTO: 2.87 K/UL
NEUTROPHILS NFR BLD AUTO: 49.6 %
NITRITE URINE: NEGATIVE
NONHDLC SERPL-MCNC: 212 MG/DL
PH URINE: 5.5
PLATELET # BLD AUTO: 190 K/UL
POTASSIUM SERPL-SCNC: 4.1 MMOL/L
PROT SERPL-MCNC: 8.2 G/DL
PROTEIN URINE: NEGATIVE MG/DL
PSA SERPL-MCNC: 1.34 NG/ML
RBC # BLD: 5.12 M/UL
RBC # FLD: 13.2 %
RED BLOOD CELLS URINE: 0 /HPF
SODIUM SERPL-SCNC: 142 MMOL/L
SPECIFIC GRAVITY URINE: 1.02
T4 FREE SERPL-MCNC: 1 NG/DL
TIBC SERPL-MCNC: 339 UG/DL
TRANSFERRIN SERPL-MCNC: 260 MG/DL
TRIGL SERPL-MCNC: 134 MG/DL
TSH SERPL-ACNC: 1.3 UIU/ML
UIBC SERPL-MCNC: 274 UG/DL
UROBILINOGEN URINE: 1 MG/DL
VIT B12 SERPL-MCNC: 594 PG/ML
WBC # FLD AUTO: 5.78 K/UL
WHITE BLOOD CELLS URINE: 1 /HPF

## 2023-09-25 RX ORDER — ATORVASTATIN CALCIUM 40 MG/1
40 TABLET, FILM COATED ORAL
Qty: 90 | Refills: 0 | Status: ACTIVE | COMMUNITY
Start: 2022-10-28 | End: 1900-01-01

## 2023-09-28 NOTE — PLAN
[FreeTextEntry1] : X-rays right heel 3 views revealed no signs of any obvious osteomyelitis\par consider MRI with contrast for right heel but no signs of cellulitis or probing of wound to bone so we will hold off on getting MRI at this point\par decreased size and depth of wound\par no signs of cellulitis no signs of acute infection\par full thickness debridement of ulceration right heel with sterile 3 10 blade down to the level of the subcutaneous tissue\par continue with santyl collagenase daily wound care dressings\par no need for oral antibiosis since there is no signs of cellulitis but concerning for chronic osteomyelitis right heel\par Continue with multipodus boot to off load posterior right heel wound \par Patient not using multipodus boot every night\par +Diabetic pressure wound right posterior plantar heel\par Patient is using the off loading boot or heel relief shoe\par stress importance of getting off loading boot to avoid bone infection of calcaneus\par stress importance of staying off of right foot and keeping pressure off of right heel\par patient told that 50% of all patients with calcaneal osteomyelitis will need a below knee amputation so it is important to not wear sandals and properly off load right heel at all times of day and night\par  Rituxan Counseling:  I discussed with the patient the risks of Rituxan infusions. Side effects can include infusion reactions, severe drug rashes including mucocutaneous reactions, reactivation of latent hepatitis and other infections and rarely progressive multifocal leukoencephalopathy.  All of the patient's questions and concerns were addressed.

## 2023-10-13 ENCOUNTER — NON-APPOINTMENT (OUTPATIENT)
Age: 53
End: 2023-10-13

## 2023-10-13 ENCOUNTER — APPOINTMENT (OUTPATIENT)
Dept: CARDIOLOGY | Facility: CLINIC | Age: 53
End: 2023-10-13
Payer: COMMERCIAL

## 2023-10-13 PROCEDURE — 93015 CV STRESS TEST SUPVJ I&R: CPT

## 2023-10-13 PROCEDURE — A9500: CPT

## 2023-10-13 PROCEDURE — 78452 HT MUSCLE IMAGE SPECT MULT: CPT

## 2023-10-13 PROCEDURE — 93306 TTE W/DOPPLER COMPLETE: CPT

## 2023-10-13 RX ORDER — REGADENOSON 0.08 MG/ML
0.4 INJECTION, SOLUTION INTRAVENOUS
Qty: 1 | Refills: 0 | Status: COMPLETED | OUTPATIENT
Start: 2023-10-13

## 2023-10-16 ENCOUNTER — NON-APPOINTMENT (OUTPATIENT)
Age: 53
End: 2023-10-16

## 2023-10-16 ENCOUNTER — APPOINTMENT (OUTPATIENT)
Dept: CARDIOLOGY | Facility: CLINIC | Age: 53
End: 2023-10-16
Payer: COMMERCIAL

## 2023-10-16 VITALS
WEIGHT: 315 LBS | OXYGEN SATURATION: 94 % | HEIGHT: 69 IN | DIASTOLIC BLOOD PRESSURE: 70 MMHG | TEMPERATURE: 97.9 F | SYSTOLIC BLOOD PRESSURE: 130 MMHG | BODY MASS INDEX: 46.65 KG/M2 | HEART RATE: 86 BPM

## 2023-10-16 DIAGNOSIS — R94.39 ABNORMAL RESULT OF OTHER CARDIOVASCULAR FUNCTION STUDY: ICD-10-CM

## 2023-10-16 PROCEDURE — 93000 ELECTROCARDIOGRAM COMPLETE: CPT

## 2023-10-16 PROCEDURE — 99214 OFFICE O/P EST MOD 30 MIN: CPT | Mod: 25

## 2023-10-17 LAB
ALBUMIN SERPL ELPH-MCNC: 4.3 G/DL
ALP BLD-CCNC: 119 U/L
ALT SERPL-CCNC: 13 U/L
ANION GAP SERPL CALC-SCNC: 10 MMOL/L
AST SERPL-CCNC: 17 U/L
BASOPHILS # BLD AUTO: 0.05 K/UL
BASOPHILS NFR BLD AUTO: 0.8 %
BILIRUB SERPL-MCNC: 0.3 MG/DL
BUN SERPL-MCNC: 16 MG/DL
CALCIUM SERPL-MCNC: 10.1 MG/DL
CHLORIDE SERPL-SCNC: 102 MMOL/L
CO2 SERPL-SCNC: 28 MMOL/L
CREAT SERPL-MCNC: 1.44 MG/DL
EGFR: 57 ML/MIN/1.73M2
EOSINOPHIL # BLD AUTO: 0.12 K/UL
EOSINOPHIL NFR BLD AUTO: 1.8 %
GLUCOSE SERPL-MCNC: 148 MG/DL
HCT VFR BLD CALC: 49.6 %
HGB BLD-MCNC: 15.5 G/DL
IMM GRANULOCYTES NFR BLD AUTO: 0.3 %
LYMPHOCYTES # BLD AUTO: 2.25 K/UL
LYMPHOCYTES NFR BLD AUTO: 34.5 %
MAN DIFF?: NORMAL
MCHC RBC-ENTMCNC: 29.5 PG
MCHC RBC-ENTMCNC: 31.3 GM/DL
MCV RBC AUTO: 94.3 FL
MONOCYTES # BLD AUTO: 0.64 K/UL
MONOCYTES NFR BLD AUTO: 9.8 %
NEUTROPHILS # BLD AUTO: 3.45 K/UL
NEUTROPHILS NFR BLD AUTO: 52.8 %
PLATELET # BLD AUTO: 241 K/UL
POTASSIUM SERPL-SCNC: 4.4 MMOL/L
PROT SERPL-MCNC: 8.2 G/DL
RBC # BLD: 5.26 M/UL
RBC # FLD: 13.1 %
SODIUM SERPL-SCNC: 140 MMOL/L
WBC # FLD AUTO: 6.53 K/UL

## 2023-10-18 ENCOUNTER — NON-APPOINTMENT (OUTPATIENT)
Age: 53
End: 2023-10-18

## 2023-10-20 ENCOUNTER — TRANSCRIPTION ENCOUNTER (OUTPATIENT)
Age: 53
End: 2023-10-20

## 2023-10-20 ENCOUNTER — OUTPATIENT (OUTPATIENT)
Dept: OUTPATIENT SERVICES | Facility: HOSPITAL | Age: 53
LOS: 1 days | End: 2023-10-20
Payer: COMMERCIAL

## 2023-10-20 VITALS
HEART RATE: 79 BPM | OXYGEN SATURATION: 94 % | DIASTOLIC BLOOD PRESSURE: 78 MMHG | SYSTOLIC BLOOD PRESSURE: 146 MMHG | RESPIRATION RATE: 18 BRPM

## 2023-10-20 VITALS — DIASTOLIC BLOOD PRESSURE: 80 MMHG | HEART RATE: 88 BPM | RESPIRATION RATE: 16 BRPM | SYSTOLIC BLOOD PRESSURE: 160 MMHG

## 2023-10-20 DIAGNOSIS — Z98.890 OTHER SPECIFIED POSTPROCEDURAL STATES: Chronic | ICD-10-CM

## 2023-10-20 DIAGNOSIS — R94.39 ABNORMAL RESULT OF OTHER CARDIOVASCULAR FUNCTION STUDY: ICD-10-CM

## 2023-10-20 LAB
ANION GAP SERPL CALC-SCNC: 13 MMOL/L — SIGNIFICANT CHANGE UP (ref 5–17)
ANION GAP SERPL CALC-SCNC: 13 MMOL/L — SIGNIFICANT CHANGE UP (ref 5–17)
BUN SERPL-MCNC: 16 MG/DL — SIGNIFICANT CHANGE UP (ref 7–23)
BUN SERPL-MCNC: 16 MG/DL — SIGNIFICANT CHANGE UP (ref 7–23)
CALCIUM SERPL-MCNC: 9.9 MG/DL — SIGNIFICANT CHANGE UP (ref 8.4–10.5)
CALCIUM SERPL-MCNC: 9.9 MG/DL — SIGNIFICANT CHANGE UP (ref 8.4–10.5)
CHLORIDE SERPL-SCNC: 102 MMOL/L — SIGNIFICANT CHANGE UP (ref 96–108)
CHLORIDE SERPL-SCNC: 102 MMOL/L — SIGNIFICANT CHANGE UP (ref 96–108)
CO2 SERPL-SCNC: 24 MMOL/L — SIGNIFICANT CHANGE UP (ref 22–31)
CO2 SERPL-SCNC: 24 MMOL/L — SIGNIFICANT CHANGE UP (ref 22–31)
CREAT SERPL-MCNC: 1.33 MG/DL — HIGH (ref 0.5–1.3)
CREAT SERPL-MCNC: 1.33 MG/DL — HIGH (ref 0.5–1.3)
EGFR: 63 ML/MIN/1.73M2 — SIGNIFICANT CHANGE UP
EGFR: 63 ML/MIN/1.73M2 — SIGNIFICANT CHANGE UP
GLUCOSE BLDC GLUCOMTR-MCNC: 207 MG/DL — HIGH (ref 70–99)
GLUCOSE BLDC GLUCOMTR-MCNC: 207 MG/DL — HIGH (ref 70–99)
GLUCOSE SERPL-MCNC: 242 MG/DL — HIGH (ref 70–99)
GLUCOSE SERPL-MCNC: 242 MG/DL — HIGH (ref 70–99)
HCT VFR BLD CALC: 46.4 % — SIGNIFICANT CHANGE UP (ref 39–50)
HCT VFR BLD CALC: 46.4 % — SIGNIFICANT CHANGE UP (ref 39–50)
HGB BLD-MCNC: 15.2 G/DL — SIGNIFICANT CHANGE UP (ref 13–17)
HGB BLD-MCNC: 15.2 G/DL — SIGNIFICANT CHANGE UP (ref 13–17)
MCHC RBC-ENTMCNC: 29.4 PG — SIGNIFICANT CHANGE UP (ref 27–34)
MCHC RBC-ENTMCNC: 29.4 PG — SIGNIFICANT CHANGE UP (ref 27–34)
MCHC RBC-ENTMCNC: 32.8 GM/DL — SIGNIFICANT CHANGE UP (ref 32–36)
MCHC RBC-ENTMCNC: 32.8 GM/DL — SIGNIFICANT CHANGE UP (ref 32–36)
MCV RBC AUTO: 89.7 FL — SIGNIFICANT CHANGE UP (ref 80–100)
MCV RBC AUTO: 89.7 FL — SIGNIFICANT CHANGE UP (ref 80–100)
NRBC # BLD: 0 /100 WBCS — SIGNIFICANT CHANGE UP (ref 0–0)
NRBC # BLD: 0 /100 WBCS — SIGNIFICANT CHANGE UP (ref 0–0)
PLATELET # BLD AUTO: 207 K/UL — SIGNIFICANT CHANGE UP (ref 150–400)
PLATELET # BLD AUTO: 207 K/UL — SIGNIFICANT CHANGE UP (ref 150–400)
POTASSIUM SERPL-MCNC: 4.4 MMOL/L — SIGNIFICANT CHANGE UP (ref 3.5–5.3)
POTASSIUM SERPL-MCNC: 4.4 MMOL/L — SIGNIFICANT CHANGE UP (ref 3.5–5.3)
POTASSIUM SERPL-SCNC: 4.4 MMOL/L — SIGNIFICANT CHANGE UP (ref 3.5–5.3)
POTASSIUM SERPL-SCNC: 4.4 MMOL/L — SIGNIFICANT CHANGE UP (ref 3.5–5.3)
RBC # BLD: 5.17 M/UL — SIGNIFICANT CHANGE UP (ref 4.2–5.8)
RBC # BLD: 5.17 M/UL — SIGNIFICANT CHANGE UP (ref 4.2–5.8)
RBC # FLD: 12.7 % — SIGNIFICANT CHANGE UP (ref 10.3–14.5)
RBC # FLD: 12.7 % — SIGNIFICANT CHANGE UP (ref 10.3–14.5)
SODIUM SERPL-SCNC: 139 MMOL/L — SIGNIFICANT CHANGE UP (ref 135–145)
SODIUM SERPL-SCNC: 139 MMOL/L — SIGNIFICANT CHANGE UP (ref 135–145)
WBC # BLD: 6.54 K/UL — SIGNIFICANT CHANGE UP (ref 3.8–10.5)
WBC # BLD: 6.54 K/UL — SIGNIFICANT CHANGE UP (ref 3.8–10.5)
WBC # FLD AUTO: 6.54 K/UL — SIGNIFICANT CHANGE UP (ref 3.8–10.5)
WBC # FLD AUTO: 6.54 K/UL — SIGNIFICANT CHANGE UP (ref 3.8–10.5)

## 2023-10-20 PROCEDURE — 93010 ELECTROCARDIOGRAM REPORT: CPT

## 2023-10-20 PROCEDURE — 93005 ELECTROCARDIOGRAM TRACING: CPT

## 2023-10-20 PROCEDURE — 93454 CORONARY ARTERY ANGIO S&I: CPT

## 2023-10-20 PROCEDURE — C1894: CPT

## 2023-10-20 PROCEDURE — 93454 CORONARY ARTERY ANGIO S&I: CPT | Mod: 26

## 2023-10-20 PROCEDURE — 82962 GLUCOSE BLOOD TEST: CPT

## 2023-10-20 PROCEDURE — 99152 MOD SED SAME PHYS/QHP 5/>YRS: CPT

## 2023-10-20 PROCEDURE — 80048 BASIC METABOLIC PNL TOTAL CA: CPT

## 2023-10-20 PROCEDURE — C1887: CPT

## 2023-10-20 PROCEDURE — C1769: CPT

## 2023-10-20 PROCEDURE — 36415 COLL VENOUS BLD VENIPUNCTURE: CPT

## 2023-10-20 PROCEDURE — 85027 COMPLETE CBC AUTOMATED: CPT

## 2023-10-20 RX ORDER — APIXABAN 2.5 MG/1
1 TABLET, FILM COATED ORAL
Qty: 0 | Refills: 0 | DISCHARGE

## 2023-10-20 RX ORDER — SEMAGLUTIDE 0.68 MG/ML
2 INJECTION, SOLUTION SUBCUTANEOUS
Refills: 0 | DISCHARGE

## 2023-10-20 RX ORDER — GABAPENTIN 400 MG/1
1 CAPSULE ORAL
Refills: 0 | DISCHARGE

## 2023-10-20 RX ORDER — ATORVASTATIN CALCIUM 80 MG/1
1 TABLET, FILM COATED ORAL
Refills: 0 | DISCHARGE

## 2023-10-20 RX ORDER — SODIUM CHLORIDE 9 MG/ML
250 INJECTION INTRAMUSCULAR; INTRAVENOUS; SUBCUTANEOUS ONCE
Refills: 0 | Status: COMPLETED | OUTPATIENT
Start: 2023-10-20 | End: 2023-10-20

## 2023-10-20 RX ORDER — NABUMETONE 750 MG
2 TABLET ORAL
Qty: 0 | Refills: 0 | DISCHARGE

## 2023-10-20 RX ORDER — FELODIPINE 5 MG/1
1 TABLET, FILM COATED, EXTENDED RELEASE ORAL
Refills: 0 | DISCHARGE

## 2023-10-20 RX ADMIN — SODIUM CHLORIDE 500 MILLILITER(S): 9 INJECTION INTRAMUSCULAR; INTRAVENOUS; SUBCUTANEOUS at 11:38

## 2023-10-20 NOTE — H&P CARDIOLOGY - HISTORY OF PRESENT ILLNESS
54 yo M with PMhx covid for which he was intubated , hx of Afib on eliquis last taken on Monday 10/16  , h/o  DVT in 2020 in Right arm and leg , HTN, DM, HLD, anemia he presents for [facility] today after having an abnormal nuclear stress test which showed a small area of mild anteroapical ischemia. He does report some CP and SOB with exertion but no symptoms at rest. He has no fever or chills, no N/V/D or abd pain.   He follows with Dr Aguilera .

## 2023-10-20 NOTE — H&P CARDIOLOGY - NSICDXPASTMEDICALHX_GEN_ALL_CORE_FT
PAST MEDICAL HISTORY:  Abscess     Afib     Diabetes mellitus ~ diagnosed during week of 08/13 - 19/2017    DVT, lower extremity     Hypercholesteremia

## 2023-10-20 NOTE — H&P CARDIOLOGY - NSICDXFAMILYHX_GEN_ALL_CORE_FT
FAMILY HISTORY:  Family history of heart failure, father    Sibling  Still living? Unknown  Family history of diabetes mellitus, Age at diagnosis: Age Unknown    Grandparent  Still living? Unknown  Family history of cerebrovascular accident (CVA), Age at diagnosis: Age Unknown  Family history of hypertension, Age at diagnosis: Age Unknown    Aunt  Still living? Unknown  Family history of breast cancer, Age at diagnosis: Age Unknown  Family history of breast cancer, Age at diagnosis: Age Unknown

## 2023-10-20 NOTE — ASU PREOP CHECKLIST - NSWEIGHTCALCTOOLDRUG_GEN_A_CORE
used Pt. Identified as a qualifier for Phase II Cardiac Rehab. Staff discussed benefits of exercise, program protocol, and brochure for  Ramin HARRY provided. Teach back verified. Pt. Wishes to speak with his cardiologist to discuss need for admission to program.

## 2023-10-20 NOTE — ASU DISCHARGE PLAN (ADULT/PEDIATRIC) - CARE PROVIDER_API CALL
Gonsalo Aguilera  Cardiology  [address], [address]  [place] [address]  Phone: [phone]  Fax: [phone]  Established Patient  Follow Up Time: 2 weeks

## 2023-10-20 NOTE — H&P CARDIOLOGY - NSICDXPASTSURGICALHX_GEN_ALL_CORE_FT
PAST SURGICAL HISTORY:  H/O arthroscopy of right knee     History of back surgery     History of dermoid cyst excision

## 2023-10-20 NOTE — ASU PATIENT PROFILE, ADULT - FALL HARM RISK - UNIVERSAL INTERVENTIONS
Bed in lowest position, wheels locked, appropriate side rails in place/Call bell, personal items and telephone in reach/Instruct patient to call for assistance before getting out of bed or chair/Non-slip footwear when patient is out of bed/[place] to call system/Physically safe environment - no spills, clutter or unnecessary equipment/Purposeful Proactive Rounding/Room/bathroom lighting operational, light cord in reach

## 2023-10-23 LAB
ANION GAP SERPL CALC-SCNC: 10 MMOL/L
BUN SERPL-MCNC: 16 MG/DL
CALCIUM SERPL-MCNC: 10.5 MG/DL
CHLORIDE SERPL-SCNC: 102 MMOL/L
CO2 SERPL-SCNC: 26 MMOL/L
CREAT SERPL-MCNC: 1.34 MG/DL
EGFR: 63 ML/MIN/1.73M2
GLUCOSE SERPL-MCNC: 180 MG/DL
POTASSIUM SERPL-SCNC: 4.4 MMOL/L
SODIUM SERPL-SCNC: 138 MMOL/L

## 2023-10-24 ENCOUNTER — NON-APPOINTMENT (OUTPATIENT)
Age: 53
End: 2023-10-24

## 2023-11-03 PROBLEM — I82.409 ACUTE EMBOLISM AND THROMBOSIS OF UNSPECIFIED DEEP VEINS OF UNSPECIFIED LOWER EXTREMITY: Chronic | Status: ACTIVE | Noted: 2023-10-20

## 2023-11-03 PROBLEM — I48.91 UNSPECIFIED ATRIAL FIBRILLATION: Chronic | Status: ACTIVE | Noted: 2023-10-20

## 2023-11-22 ENCOUNTER — APPOINTMENT (OUTPATIENT)
Dept: CARDIOLOGY | Facility: CLINIC | Age: 53
End: 2023-11-22
Payer: COMMERCIAL

## 2023-11-22 VITALS
BODY MASS INDEX: 46.65 KG/M2 | DIASTOLIC BLOOD PRESSURE: 80 MMHG | HEIGHT: 69 IN | OXYGEN SATURATION: 97 % | HEART RATE: 93 BPM | TEMPERATURE: 98 F | RESPIRATION RATE: 18 BRPM | WEIGHT: 315 LBS | SYSTOLIC BLOOD PRESSURE: 140 MMHG

## 2023-11-22 DIAGNOSIS — Z71.85 ENCOUNTER FOR IMMUNIZATION SAFETY COUNSELING: ICD-10-CM

## 2023-11-22 PROCEDURE — 93000 ELECTROCARDIOGRAM COMPLETE: CPT

## 2023-11-22 PROCEDURE — 99214 OFFICE O/P EST MOD 30 MIN: CPT | Mod: 25

## 2023-12-01 LAB
ANION GAP SERPL CALC-SCNC: 14 MMOL/L
BASOPHILS # BLD AUTO: 0.04 K/UL
BASOPHILS NFR BLD AUTO: 0.7 %
BUN SERPL-MCNC: 16 MG/DL
CALCIUM SERPL-MCNC: 10.1 MG/DL
CHLORIDE SERPL-SCNC: 101 MMOL/L
CO2 SERPL-SCNC: 27 MMOL/L
CREAT SERPL-MCNC: 1.42 MG/DL
EGFR: 58 ML/MIN/1.73M2
EOSINOPHIL # BLD AUTO: 0.11 K/UL
EOSINOPHIL NFR BLD AUTO: 1.8 %
FERRITIN SERPL-MCNC: 386 NG/ML
GLUCOSE SERPL-MCNC: 135 MG/DL
HCT VFR BLD CALC: 50.2 %
HGB BLD-MCNC: 15.7 G/DL
IMM GRANULOCYTES NFR BLD AUTO: 0.2 %
IRON SATN MFR SERPL: 15 %
IRON SERPL-MCNC: 55 UG/DL
LYMPHOCYTES # BLD AUTO: 2.3 K/UL
LYMPHOCYTES NFR BLD AUTO: 38 %
MAN DIFF?: NORMAL
MCHC RBC-ENTMCNC: 29.8 PG
MCHC RBC-ENTMCNC: 31.3 GM/DL
MCV RBC AUTO: 95.3 FL
MONOCYTES # BLD AUTO: 0.69 K/UL
MONOCYTES NFR BLD AUTO: 11.4 %
NEUTROPHILS # BLD AUTO: 2.9 K/UL
NEUTROPHILS NFR BLD AUTO: 47.9 %
PLATELET # BLD AUTO: 207 K/UL
POTASSIUM SERPL-SCNC: 4.2 MMOL/L
RBC # BLD: 5.27 M/UL
RBC # FLD: 13.1 %
SODIUM SERPL-SCNC: 141 MMOL/L
TIBC SERPL-MCNC: 377 UG/DL
UIBC SERPL-MCNC: 322 UG/DL
WBC # FLD AUTO: 6.05 K/UL

## 2024-02-25 ENCOUNTER — EMERGENCY (EMERGENCY)
Facility: HOSPITAL | Age: 54
LOS: 0 days | Discharge: HOME | End: 2024-02-25
Attending: STUDENT IN AN ORGANIZED HEALTH CARE EDUCATION/TRAINING PROGRAM
Payer: COMMERCIAL

## 2024-02-25 VITALS
HEIGHT: 69 IN | SYSTOLIC BLOOD PRESSURE: 170 MMHG | OXYGEN SATURATION: 97 % | WEIGHT: 315 LBS | RESPIRATION RATE: 20 BRPM | TEMPERATURE: 98 F | DIASTOLIC BLOOD PRESSURE: 110 MMHG | HEART RATE: 82 BPM

## 2024-02-25 VITALS — RESPIRATION RATE: 18 BRPM | OXYGEN SATURATION: 97 %

## 2024-02-25 DIAGNOSIS — Z98.890 OTHER SPECIFIED POSTPROCEDURAL STATES: Chronic | ICD-10-CM

## 2024-02-25 DIAGNOSIS — Z86.718 PERSONAL HISTORY OF OTHER VENOUS THROMBOSIS AND EMBOLISM: ICD-10-CM

## 2024-02-25 DIAGNOSIS — E11.9 TYPE 2 DIABETES MELLITUS WITHOUT COMPLICATIONS: ICD-10-CM

## 2024-02-25 DIAGNOSIS — I48.91 UNSPECIFIED ATRIAL FIBRILLATION: ICD-10-CM

## 2024-02-25 DIAGNOSIS — E78.5 HYPERLIPIDEMIA, UNSPECIFIED: ICD-10-CM

## 2024-02-25 DIAGNOSIS — R06.02 SHORTNESS OF BREATH: ICD-10-CM

## 2024-02-25 DIAGNOSIS — Z79.01 LONG TERM (CURRENT) USE OF ANTICOAGULANTS: ICD-10-CM

## 2024-02-25 LAB
ALBUMIN SERPL ELPH-MCNC: 3.6 G/DL — SIGNIFICANT CHANGE UP (ref 3.3–5)
ALP SERPL-CCNC: 117 U/L — SIGNIFICANT CHANGE UP (ref 40–120)
ALT FLD-CCNC: 16 U/L — SIGNIFICANT CHANGE UP (ref 12–78)
ANION GAP SERPL CALC-SCNC: 7 MMOL/L — SIGNIFICANT CHANGE UP (ref 5–17)
AST SERPL-CCNC: 13 U/L — LOW (ref 15–37)
BASOPHILS # BLD AUTO: 0.03 K/UL — SIGNIFICANT CHANGE UP (ref 0–0.2)
BASOPHILS NFR BLD AUTO: 0.6 % — SIGNIFICANT CHANGE UP (ref 0–2)
BILIRUB SERPL-MCNC: 0.4 MG/DL — SIGNIFICANT CHANGE UP (ref 0.2–1.2)
BUN SERPL-MCNC: 13 MG/DL — SIGNIFICANT CHANGE UP (ref 7–23)
CALCIUM SERPL-MCNC: 9.1 MG/DL — SIGNIFICANT CHANGE UP (ref 8.5–10.1)
CHLORIDE SERPL-SCNC: 109 MMOL/L — HIGH (ref 96–108)
CO2 SERPL-SCNC: 25 MMOL/L — SIGNIFICANT CHANGE UP (ref 22–31)
CREAT SERPL-MCNC: 1.33 MG/DL — HIGH (ref 0.5–1.3)
D DIMER BLD IA.RAPID-MCNC: <150 NG/ML DDU — SIGNIFICANT CHANGE UP
EGFR: 63 ML/MIN/1.73M2 — SIGNIFICANT CHANGE UP
EOSINOPHIL # BLD AUTO: 0.06 K/UL — SIGNIFICANT CHANGE UP (ref 0–0.5)
EOSINOPHIL NFR BLD AUTO: 1.1 % — SIGNIFICANT CHANGE UP (ref 0–6)
GLUCOSE SERPL-MCNC: 223 MG/DL — HIGH (ref 70–99)
HCT VFR BLD CALC: 50.2 % — HIGH (ref 39–50)
HGB BLD-MCNC: 15.8 G/DL — SIGNIFICANT CHANGE UP (ref 13–17)
IMM GRANULOCYTES NFR BLD AUTO: 0.2 % — SIGNIFICANT CHANGE UP (ref 0–0.9)
LYMPHOCYTES # BLD AUTO: 1.27 K/UL — SIGNIFICANT CHANGE UP (ref 1–3.3)
LYMPHOCYTES # BLD AUTO: 24.2 % — SIGNIFICANT CHANGE UP (ref 13–44)
MCHC RBC-ENTMCNC: 29.2 PG — SIGNIFICANT CHANGE UP (ref 27–34)
MCHC RBC-ENTMCNC: 31.5 G/DL — LOW (ref 32–36)
MCV RBC AUTO: 92.8 FL — SIGNIFICANT CHANGE UP (ref 80–100)
MONOCYTES # BLD AUTO: 0.48 K/UL — SIGNIFICANT CHANGE UP (ref 0–0.9)
MONOCYTES NFR BLD AUTO: 9.2 % — SIGNIFICANT CHANGE UP (ref 2–14)
NEUTROPHILS # BLD AUTO: 3.39 K/UL — SIGNIFICANT CHANGE UP (ref 1.8–7.4)
NEUTROPHILS NFR BLD AUTO: 64.7 % — SIGNIFICANT CHANGE UP (ref 43–77)
NRBC # BLD: 0 /100 WBCS — SIGNIFICANT CHANGE UP (ref 0–0)
NT-PROBNP SERPL-SCNC: 27 PG/ML — SIGNIFICANT CHANGE UP (ref 0–125)
PLATELET # BLD AUTO: 187 K/UL — SIGNIFICANT CHANGE UP (ref 150–400)
POTASSIUM SERPL-MCNC: 3.9 MMOL/L — SIGNIFICANT CHANGE UP (ref 3.5–5.3)
POTASSIUM SERPL-SCNC: 3.9 MMOL/L — SIGNIFICANT CHANGE UP (ref 3.5–5.3)
PROT SERPL-MCNC: 8.3 GM/DL — SIGNIFICANT CHANGE UP (ref 6–8.3)
RBC # BLD: 5.41 M/UL — SIGNIFICANT CHANGE UP (ref 4.2–5.8)
RBC # FLD: 12.9 % — SIGNIFICANT CHANGE UP (ref 10.3–14.5)
SODIUM SERPL-SCNC: 141 MMOL/L — SIGNIFICANT CHANGE UP (ref 135–145)
TROPONIN I, HIGH SENSITIVITY RESULT: 20.5 NG/L — SIGNIFICANT CHANGE UP
TROPONIN I, HIGH SENSITIVITY RESULT: 43.4 NG/L — SIGNIFICANT CHANGE UP
TROPONIN I, HIGH SENSITIVITY RESULT: 45.8 NG/L — SIGNIFICANT CHANGE UP
WBC # BLD: 5.24 K/UL — SIGNIFICANT CHANGE UP (ref 3.8–10.5)
WBC # FLD AUTO: 5.24 K/UL — SIGNIFICANT CHANGE UP (ref 3.8–10.5)

## 2024-02-25 PROCEDURE — 71045 X-RAY EXAM CHEST 1 VIEW: CPT | Mod: 26

## 2024-02-25 PROCEDURE — 93010 ELECTROCARDIOGRAM REPORT: CPT | Mod: 77

## 2024-02-25 PROCEDURE — 93010 ELECTROCARDIOGRAM REPORT: CPT

## 2024-02-25 PROCEDURE — 99285 EMERGENCY DEPT VISIT HI MDM: CPT

## 2024-02-25 RX ORDER — ACETAMINOPHEN 500 MG
975 TABLET ORAL ONCE
Refills: 0 | Status: COMPLETED | OUTPATIENT
Start: 2024-02-25 | End: 2024-02-25

## 2024-02-25 RX ADMIN — Medication 975 MILLIGRAM(S): at 14:41

## 2024-02-25 NOTE — ED PROVIDER NOTE - PROGRESS NOTE DETAILS
Trop increasing.  Discussed with interventional cardiology through transfer center given recent LHC with 1 vessel with severe stenosis.  Patient remains pain free at this time.  Would recommend repeat troponin and ecg now and consider treating as nstemi if it continues to rise. Repeat trop not continuing to rise.  Likely demand from incident but does not appear to have ongoing ischemia.  Would recommend follow up outpatient.  Discussed with patient and answered questions, will dc. Trop increasing.  Discussed with interventional cardiology through transfer center, Dr. Hargrove, given recent LHC with 1 vessel with severe stenosis.  Patient remains pain free at this time.  Would recommend repeat troponin and ecg now and consider treating as nstemi if it continues to rise.

## 2024-02-25 NOTE — ED ADULT NURSE NOTE - OBJECTIVE STATEMENT
As per pt he was katherine dispute at his house, does not wish to disclose further information - under PD custody in ED. Pt states felt normal yesterday, had situation occur at home that left him SOB, denies CP at this time but states had earlier, denies smoking, noted to have room O2 90%. PMH afib, diabetes on eliquis, allergic to hydroxycholoroquine. Denies fever coughing resp symptoms.

## 2024-02-25 NOTE — ED PROVIDER NOTE - CLINICAL SUMMARY MEDICAL DECISION MAKING FREE TEXT BOX
55yo male with pmh afib, dvt, on ac, dm, hld, presenting with shortness of breath.  Patient under arrest.  States he was having a verbal altercation at the house and police were called.  No physical injuries or trauma.  States shortness of breath started when he was placed under arrest and handcuffed.  Thinks it started 2/2 stress.  No cp now but had earlier.  Also endorsing whole body pain "from head to toe."  Never had this before.  No fever, cough, congestion, n/v, diaphoresis, abd pain, back pain, edema.  Reviewed prior cath report in system from prior admission late last year - 1 vessel with severe stenosis not amenable to pci, remainder overall ok.  Endorsing compliance with ac.  Exam here nonfocal and patient appears comfortable.  Will eval acs, pe.  Lungs cta, doubt pna, infection.  Nonsmoker, no copd/ asthma hx.  Plan labs with trop x2, tele, xr, reassess.

## 2024-02-25 NOTE — ED ADULT NURSE REASSESSMENT NOTE - NS ED NURSE REASSESS COMMENT FT1
pt resting on stretcher, repeat trop sent, PD at bedside pt remains under custody. Denies food tray at this time, awaiting results.

## 2024-02-25 NOTE — ED PROVIDER NOTE - CARE PROVIDER_API CALL
Clayton Jessica  Cardiology  [address] [place] [address]  Phone: [phone]  Fax: [phone]  Follow Up Time:

## 2024-02-25 NOTE — ED PROVIDER NOTE - PHYSICAL EXAMINATION
General appearance: Nontoxic appearing, conversant, afebrile    Eyes: anicteric sclerae, NATTY, EOMI   HENT: Atraumatic; oropharynx clear, MMM and no ulcerations, no pharyngeal erythema or exudate   Neck: Trachea midline; Full range of motion, supple   Pulm: CTA bl, normal respiratory effort and no intercostal retractions, normal work of breathing   CV: RRR, No murmurs, rubs, or gallops   Abdomen: Soft, non-tender, non-distended; no guarding or rebound   Extremities: Trace peripheral edema, no gross deformities, FROM x4   Skin: Dry, normal temperature, turgor and texture; no rash   Psych: Appropriate affect, cooperative

## 2024-02-25 NOTE — ED ADULT NURSE NOTE - NS ED NURSE DC INFO COMPLEXITY
Problem: Nutrition  Goal: Tolerates feedings  Outcome: Monitoring/Evaluating progress  Note: Tolerating increasing feeds      Simple: Patient demonstrates quick and easy understanding/Verbalized Understanding

## 2024-02-25 NOTE — ED PROVIDER NOTE - EKG #1 DATE/TIME
L back mass, R thigh mass x2, RLQ mass x2, LLQ mass, R flank mass x2, Midline mass x3, R shoulder mass 25-Feb-2024 13:01

## 2024-02-25 NOTE — ED PROVIDER NOTE - PATIENT PORTAL LINK FT
You can access the FollowMyHealth Patient Portal offered by [facility] by registering at the following website: [email or web address]. By joining [employer]’s FollowMyHealth portal, you will also be able to view your health information using other applications (apps) compatible with our system.

## 2024-02-25 NOTE — ED PROVIDER NOTE - CCCP TRG CHIEF CMPLNT
Dr. Villeda discussed with Dr. Rodriguez. He is scheduled for CEA tomorrow, pending the results of his cardiac angiogram today.
shortness of breath

## 2024-02-27 ENCOUNTER — NON-APPOINTMENT (OUTPATIENT)
Age: 54
End: 2024-02-27

## 2024-02-27 ENCOUNTER — APPOINTMENT (OUTPATIENT)
Dept: INTERNAL MEDICINE | Facility: CLINIC | Age: 54
End: 2024-02-27
Payer: COMMERCIAL

## 2024-02-27 VITALS
SYSTOLIC BLOOD PRESSURE: 142 MMHG | OXYGEN SATURATION: 95 % | WEIGHT: 315 LBS | DIASTOLIC BLOOD PRESSURE: 100 MMHG | BODY MASS INDEX: 46.65 KG/M2 | TEMPERATURE: 98.4 F | HEIGHT: 69 IN | HEART RATE: 75 BPM

## 2024-02-27 VITALS — SYSTOLIC BLOOD PRESSURE: 140 MMHG | DIASTOLIC BLOOD PRESSURE: 100 MMHG

## 2024-02-27 PROCEDURE — 99214 OFFICE O/P EST MOD 30 MIN: CPT

## 2024-02-27 PROCEDURE — 93000 ELECTROCARDIOGRAM COMPLETE: CPT

## 2024-02-27 PROCEDURE — G2211 COMPLEX E/M VISIT ADD ON: CPT

## 2024-02-27 NOTE — PHYSICAL EXAM
[Well Nourished] : well nourished [No Acute Distress] : no acute distress [Well Developed] : well developed [Well-Appearing] : well-appearing [Normal Sclera/Conjunctiva] : normal sclera/conjunctiva [Normal Outer Ear/Nose] : the outer ears and nose were normal in appearance [No JVD] : no jugular venous distention [Normal Oropharynx] : the oropharynx was normal [No Lymphadenopathy] : no lymphadenopathy [Supple] : supple [No Respiratory Distress] : no respiratory distress  [No Accessory Muscle Use] : no accessory muscle use [Clear to Auscultation] : lungs were clear to auscultation bilaterally [Normal Rate] : normal rate  [Regular Rhythm] : with a regular rhythm [No Murmur] : no murmur heard [Normal S1, S2] : normal S1 and S2 [No Carotid Bruits] : no carotid bruits [No Varicosities] : no varicosities [Pedal Pulses Present] : the pedal pulses are present [No Extremity Clubbing/Cyanosis] : no extremity clubbing/cyanosis [Soft] : abdomen soft [Non Tender] : non-tender [Non-distended] : non-distended [Normal Bowel Sounds] : normal bowel sounds [Normal Anterior Cervical Nodes] : no anterior cervical lymphadenopathy [No CVA Tenderness] : no CVA  tenderness [No Joint Swelling] : no joint swelling [No Spinal Tenderness] : no spinal tenderness [Grossly Normal Strength/Tone] : grossly normal strength/tone [No Rash] : no rash [Coordination Grossly Intact] : coordination grossly intact [No Focal Deficits] : no focal deficits [Normal Gait] : normal gait [Alert and Oriented x3] : oriented to person, place, and time [de-identified] : 1+ pitting edema prison up

## 2024-02-27 NOTE — HEALTH RISK ASSESSMENT
[0] : 2) Feeling down, depressed, or hopeless: Not at all (0) [PHQ-2 Negative - No further assessment needed] : PHQ-2 Negative - No further assessment needed [Never] : Never [[id number]] : 0

## 2024-02-27 NOTE — HISTORY OF PRESENT ILLNESS
[FreeTextEntry1] : ER f/u [de-identified] : This is a 55 y/o male with a pmhx of Covid s/p intubation/extubation afib (on eliquis) hx of DVT (on eliquis) DM HTN HLD anemia coming in today for ER follow up.  Pt went to [facility] on 2/25 for SOB after being arrested and was in policy custody at the time. D-dimer negative. Troponin negative x2. Creatinine at 1.3, baseline. Bloodwork otherwise negative. CXR showed mild increased pulmonary vascular markings which may represent congestion. Since then, he reports SOB has improved and back to his baseline. Says he had SOB that day due to stress as he was in police custody over a dispute.  Denies chest pain, SOB, VASQUZE, dizziness, diaphoresis, palpitations, LE swelling, orthopnea, syncope, n/v, headache.

## 2024-02-28 LAB
ALBUMIN SERPL ELPH-MCNC: 4.2 G/DL
ALP BLD-CCNC: 104 U/L
ALT SERPL-CCNC: 9 U/L
ANION GAP SERPL CALC-SCNC: 13 MMOL/L
AST SERPL-CCNC: 18 U/L
BASOPHILS # BLD AUTO: 0.04 K/UL
BASOPHILS NFR BLD AUTO: 0.7 %
BILIRUB SERPL-MCNC: 0.5 MG/DL
BUN SERPL-MCNC: 15 MG/DL
CALCIUM SERPL-MCNC: 9.5 MG/DL
CHLORIDE SERPL-SCNC: 103 MMOL/L
CHOLEST SERPL-MCNC: 270 MG/DL
CK SERPL-CCNC: 782 U/L
CO2 SERPL-SCNC: 24 MMOL/L
CREAT SERPL-MCNC: 1.29 MG/DL
EGFR: 65 ML/MIN/1.73M2
EOSINOPHIL # BLD AUTO: 0.06 K/UL
EOSINOPHIL NFR BLD AUTO: 1.1 %
ESTIMATED AVERAGE GLUCOSE: 186 MG/DL
GLUCOSE SERPL-MCNC: 158 MG/DL
HBA1C MFR BLD HPLC: 8.1 %
HCT VFR BLD CALC: 47.6 %
HDLC SERPL-MCNC: 37 MG/DL
HGB BLD-MCNC: 15.5 G/DL
IMM GRANULOCYTES NFR BLD AUTO: 0.2 %
LDLC SERPL CALC-MCNC: 212 MG/DL
LYMPHOCYTES # BLD AUTO: 1.82 K/UL
LYMPHOCYTES NFR BLD AUTO: 32.6 %
MAN DIFF?: NORMAL
MCHC RBC-ENTMCNC: 29.9 PG
MCHC RBC-ENTMCNC: 32.6 GM/DL
MCV RBC AUTO: 91.9 FL
MONOCYTES # BLD AUTO: 0.56 K/UL
MONOCYTES NFR BLD AUTO: 10 %
NEUTROPHILS # BLD AUTO: 3.09 K/UL
NEUTROPHILS NFR BLD AUTO: 55.4 %
NONHDLC SERPL-MCNC: 233 MG/DL
NT-PROBNP SERPL-MCNC: <36 PG/ML
PLATELET # BLD AUTO: 194 K/UL
POTASSIUM SERPL-SCNC: 3.9 MMOL/L
PROT SERPL-MCNC: 8.1 G/DL
RBC # BLD: 5.18 M/UL
RBC # FLD: 13 %
SODIUM SERPL-SCNC: 140 MMOL/L
T4 FREE SERPL-MCNC: 1 NG/DL
TRIGL SERPL-MCNC: 117 MG/DL
TSH SERPL-ACNC: 1.32 UIU/ML
WBC # FLD AUTO: 5.58 K/UL

## 2024-03-04 ENCOUNTER — APPOINTMENT (OUTPATIENT)
Dept: INTERNAL MEDICINE | Facility: CLINIC | Age: 54
End: 2024-03-04
Payer: COMMERCIAL

## 2024-03-04 ENCOUNTER — APPOINTMENT (OUTPATIENT)
Dept: CARDIOLOGY | Facility: CLINIC | Age: 54
End: 2024-03-04
Payer: COMMERCIAL

## 2024-03-04 VITALS — SYSTOLIC BLOOD PRESSURE: 108 MMHG | DIASTOLIC BLOOD PRESSURE: 70 MMHG

## 2024-03-04 VITALS
HEART RATE: 92 BPM | HEIGHT: 69 IN | WEIGHT: 315 LBS | OXYGEN SATURATION: 95 % | BODY MASS INDEX: 46.65 KG/M2 | TEMPERATURE: 98.8 F | SYSTOLIC BLOOD PRESSURE: 106 MMHG | DIASTOLIC BLOOD PRESSURE: 82 MMHG

## 2024-03-04 DIAGNOSIS — D64.9 ANEMIA, UNSPECIFIED: ICD-10-CM

## 2024-03-04 PROCEDURE — G2211 COMPLEX E/M VISIT ADD ON: CPT

## 2024-03-04 PROCEDURE — 93306 TTE W/DOPPLER COMPLETE: CPT

## 2024-03-04 PROCEDURE — 99214 OFFICE O/P EST MOD 30 MIN: CPT

## 2024-03-05 LAB
ALBUMIN SERPL ELPH-MCNC: 4.5 G/DL
ALP BLD-CCNC: 108 U/L
ALT SERPL-CCNC: 27 U/L
ANION GAP SERPL CALC-SCNC: 17 MMOL/L
AST SERPL-CCNC: 27 U/L
BILIRUB SERPL-MCNC: 0.6 MG/DL
BUN SERPL-MCNC: 14 MG/DL
CALCIUM SERPL-MCNC: 9.6 MG/DL
CHLORIDE SERPL-SCNC: 104 MMOL/L
CK SERPL-CCNC: 294 U/L
CO2 SERPL-SCNC: 22 MMOL/L
CREAT SERPL-MCNC: 1.61 MG/DL
EGFR: 50 ML/MIN/1.73M2
GLUCOSE SERPL-MCNC: 86 MG/DL
NT-PROBNP SERPL-MCNC: 41 PG/ML
POTASSIUM SERPL-SCNC: 4 MMOL/L
PROT SERPL-MCNC: 7.9 G/DL
SODIUM SERPL-SCNC: 142 MMOL/L

## 2024-03-09 ENCOUNTER — APPOINTMENT (OUTPATIENT)
Dept: RADIOLOGY | Facility: IMAGING CENTER | Age: 54
End: 2024-03-09
Payer: COMMERCIAL

## 2024-03-09 ENCOUNTER — OUTPATIENT (OUTPATIENT)
Dept: OUTPATIENT SERVICES | Facility: HOSPITAL | Age: 54
LOS: 1 days | End: 2024-03-09
Payer: COMMERCIAL

## 2024-03-09 DIAGNOSIS — Z98.890 OTHER SPECIFIED POSTPROCEDURAL STATES: Chronic | ICD-10-CM

## 2024-03-09 DIAGNOSIS — R09.89 OTHER SPECIFIED SYMPTOMS AND SIGNS INVOLVING THE CIRCULATORY AND RESPIRATORY SYSTEMS: ICD-10-CM

## 2024-03-09 PROCEDURE — 71046 X-RAY EXAM CHEST 2 VIEWS: CPT | Mod: 26

## 2024-03-09 PROCEDURE — 71046 X-RAY EXAM CHEST 2 VIEWS: CPT

## 2024-03-11 PROBLEM — D64.9 ANEMIA, UNSPECIFIED TYPE: Status: ACTIVE | Noted: 2020-08-05

## 2024-03-11 NOTE — PHYSICAL EXAM
[No Acute Distress] : no acute distress [Well Nourished] : well nourished [Well Developed] : well developed [Well-Appearing] : well-appearing [Normal Sclera/Conjunctiva] : normal sclera/conjunctiva [Normal Outer Ear/Nose] : the outer ears and nose were normal in appearance [Normal Oropharynx] : the oropharynx was normal [No JVD] : no jugular venous distention [No Lymphadenopathy] : no lymphadenopathy [Supple] : supple [No Respiratory Distress] : no respiratory distress  [No Accessory Muscle Use] : no accessory muscle use [Clear to Auscultation] : lungs were clear to auscultation bilaterally [Normal Rate] : normal rate  [Regular Rhythm] : with a regular rhythm [Normal S1, S2] : normal S1 and S2 [No Murmur] : no murmur heard [No Carotid Bruits] : no carotid bruits [No Varicosities] : no varicosities [Pedal Pulses Present] : the pedal pulses are present [No Extremity Clubbing/Cyanosis] : no extremity clubbing/cyanosis [Soft] : abdomen soft [Non Tender] : non-tender [Non-distended] : non-distended [Normal Bowel Sounds] : normal bowel sounds [Normal Anterior Cervical Nodes] : no anterior cervical lymphadenopathy [No CVA Tenderness] : no CVA  tenderness [No Spinal Tenderness] : no spinal tenderness [No Joint Swelling] : no joint swelling [Grossly Normal Strength/Tone] : grossly normal strength/tone [No Rash] : no rash [Coordination Grossly Intact] : coordination grossly intact [No Focal Deficits] : no focal deficits [Normal Gait] : normal gait [Alert and Oriented x3] : oriented to person, place, and time [de-identified] : trace ankle edema bilaterally, improved from last time

## 2024-03-11 NOTE — HEALTH RISK ASSESSMENT
[0] : 2) Feeling down, depressed, or hopeless: Not at all (0) [PHQ-2 Negative - No further assessment needed] : PHQ-2 Negative - No further assessment needed [Never] : Never [[id number]] : 0

## 2024-03-11 NOTE — HISTORY OF PRESENT ILLNESS
[FreeTextEntry1] : bp f/u [de-identified] : This is a 53 y/o male with a pmhx of Covid s/p intubation/extubation afib (on eliquis) hx of DVT (on eliquis) DM HTN HLD anemia coming in today for bp check.  Last week bp was elevated and Pt had LE swelling and chest congestion seen on CXR. Thus started lasix 40mg. Since then, Pt notes LE swelling has improved and breathing is a little better also. He feels well and has no acute complaints today. Denies chest pain, SOB, dizziness, diaphoresis, palpitations, LE swelling, orthopnea, syncope, n/v, headache.

## 2024-03-21 ENCOUNTER — NON-APPOINTMENT (OUTPATIENT)
Age: 54
End: 2024-03-21

## 2024-03-22 ENCOUNTER — APPOINTMENT (OUTPATIENT)
Dept: ENDOCRINOLOGY | Facility: CLINIC | Age: 54
End: 2024-03-22
Payer: COMMERCIAL

## 2024-03-22 ENCOUNTER — APPOINTMENT (OUTPATIENT)
Dept: CARDIOLOGY | Facility: CLINIC | Age: 54
End: 2024-03-22
Payer: COMMERCIAL

## 2024-03-22 ENCOUNTER — APPOINTMENT (OUTPATIENT)
Dept: INTERNAL MEDICINE | Facility: CLINIC | Age: 54
End: 2024-03-22
Payer: COMMERCIAL

## 2024-03-22 VITALS
OXYGEN SATURATION: 97 % | BODY MASS INDEX: 46.65 KG/M2 | WEIGHT: 315 LBS | RESPIRATION RATE: 16 BRPM | HEIGHT: 69 IN | SYSTOLIC BLOOD PRESSURE: 110 MMHG | HEART RATE: 84 BPM | DIASTOLIC BLOOD PRESSURE: 76 MMHG | TEMPERATURE: 98.8 F

## 2024-03-22 VITALS
SYSTOLIC BLOOD PRESSURE: 114 MMHG | DIASTOLIC BLOOD PRESSURE: 76 MMHG | TEMPERATURE: 98.7 F | BODY MASS INDEX: 46.65 KG/M2 | HEIGHT: 69 IN | HEART RATE: 85 BPM | WEIGHT: 315 LBS | OXYGEN SATURATION: 99 %

## 2024-03-22 VITALS
SYSTOLIC BLOOD PRESSURE: 110 MMHG | OXYGEN SATURATION: 97 % | HEART RATE: 84 BPM | DIASTOLIC BLOOD PRESSURE: 90 MMHG | WEIGHT: 315 LBS | TEMPERATURE: 98.8 F | HEIGHT: 69 IN | BODY MASS INDEX: 46.65 KG/M2

## 2024-03-22 DIAGNOSIS — I49.3 VENTRICULAR PREMATURE DEPOLARIZATION: ICD-10-CM

## 2024-03-22 DIAGNOSIS — R74.8 ABNORMAL LEVELS OF OTHER SERUM ENZYMES: ICD-10-CM

## 2024-03-22 LAB — GLUCOSE BLDC GLUCOMTR-MCNC: 111

## 2024-03-22 PROCEDURE — 99214 OFFICE O/P EST MOD 30 MIN: CPT

## 2024-03-22 PROCEDURE — 82962 GLUCOSE BLOOD TEST: CPT

## 2024-03-22 PROCEDURE — G2211 COMPLEX E/M VISIT ADD ON: CPT

## 2024-03-22 RX ORDER — GLIPIZIDE 2.5 MG/1
2.5 TABLET, FILM COATED, EXTENDED RELEASE ORAL DAILY
Qty: 90 | Refills: 1 | Status: ACTIVE | COMMUNITY
Start: 2022-10-28 | End: 1900-01-01

## 2024-03-22 RX ORDER — ORAL SEMAGLUTIDE 14 MG/1
14 TABLET ORAL
Qty: 90 | Refills: 1 | Status: DISCONTINUED | COMMUNITY
Start: 2022-12-28 | End: 2024-03-22

## 2024-03-22 NOTE — HISTORY OF PRESENT ILLNESS
[FreeTextEntry1] : 54 year M here for f/up for Type 2 diabetes mellitus  no new complaints   Screening  Podiatry: follows LDL:  212, prescribed statin    EGFR: 50 microalbumin: cr: -ve  Current diabetic medication regimen (verified with patient): glipizide ER 2.5mg po daily  rybelsus 14 mg po daily   Prior intolerance to metformin (dizziness, diarrhea)    SMBG: not performed  No recent reported hypoglycemia    Obesity:  No appreciable weight loss with rybelsus

## 2024-03-22 NOTE — PHYSICAL EXAM
[Alert] : alert [Well Nourished] : well nourished [Obese] : obese [No Acute Distress] : no acute distress [Well Developed] : well developed [Normal Sclera/Conjunctiva] : normal sclera/conjunctiva [EOMI] : extra ocular movement intact [No Proptosis] : no proptosis [Normal Oropharynx] : the oropharynx was normal [Supple] : the neck was supple [Thyroid Not Enlarged] : the thyroid was not enlarged [No Respiratory Distress] : no respiratory distress [No Accessory Muscle Use] : no accessory muscle use [Clear to Auscultation] : lungs were clear to auscultation bilaterally [Normal S1, S2] : normal S1 and S2 [Normal Rate] : heart rate was normal [Regular Rhythm] : with a regular rhythm [No Edema] : no peripheral edema [Pedal Pulses Normal] : the pedal pulses are present [Normal Bowel Sounds] : normal bowel sounds [Not Tender] : non-tender [Not Distended] : not distended [Soft] : abdomen soft [Normal Anterior Cervical Nodes] : no anterior cervical lymphadenopathy [No Spinal Tenderness] : no spinal tenderness [Spine Straight] : spine straight [No Stigmata of Cushings Syndrome] : no stigmata of Cushings Syndrome [Normal Gait] : normal gait [Normal Strength/Tone] : muscle strength and tone were normal [No Rash] : no rash [Acanthosis Nigricans] : no acanthosis nigricans [Normal Reflexes] : deep tendon reflexes were 2+ and symmetric [No Tremors] : no tremors [Oriented x3] : oriented to person, place, and time

## 2024-03-22 NOTE — HISTORY OF PRESENT ILLNESS
[FreeTextEntry1] : This is a 53 y/o male with a pmhx of ovid s/p intubation/extubation afib (on eliquis) hx of DVT (on eliquis) DM HTN HLD anemia coming in today for follow up. Patient was seen by Dr. Garrison 2/27/24 reporting SOB with LE edema and chest congestion seen on CXR and was started on lasix 40. He was noted with increased Cr and lasix was lowered to 40 mg alternating with 20 mg po qd. He reports SOB has improved. Chest pain has resolved.  Patient denies chest pain, palpitations, dizziness, syncope, changes in bowel/bladder habits or appetite.

## 2024-03-23 LAB
25(OH)D3 SERPL-MCNC: 30.4 NG/ML
25(OH)D3 SERPL-MCNC: 30.8 NG/ML
25(OH)D3 SERPL-MCNC: 35.4 NG/ML
ALBUMIN SERPL ELPH-MCNC: 4.1 G/DL
ALBUMIN SERPL ELPH-MCNC: 4.2 G/DL
ALBUMIN SERPL ELPH-MCNC: 4.3 G/DL
ALBUMIN SERPL ELPH-MCNC: 4.8 G/DL
ALP BLD-CCNC: 114 U/L
ALP BLD-CCNC: 116 U/L
ALP BLD-CCNC: 116 U/L
ALP BLD-CCNC: 128 U/L
ALT SERPL-CCNC: 11 U/L
ALT SERPL-CCNC: 11 U/L
ALT SERPL-CCNC: 12 U/L
ALT SERPL-CCNC: 12 U/L
ANION GAP SERPL CALC-SCNC: 11 MMOL/L
ANION GAP SERPL CALC-SCNC: 12 MMOL/L
ANION GAP SERPL CALC-SCNC: 12 MMOL/L
ANION GAP SERPL CALC-SCNC: 13 MMOL/L
ANION GAP SERPL CALC-SCNC: 14 MMOL/L
ANION GAP SERPL CALC-SCNC: 16 MMOL/L
ANION GAP SERPL CALC-SCNC: 17 MMOL/L
APPEARANCE: CLEAR
AST SERPL-CCNC: 15 U/L
AST SERPL-CCNC: 15 U/L
AST SERPL-CCNC: 21 U/L
AST SERPL-CCNC: 22 U/L
BACTERIA UR CULT: NORMAL
BACTERIA: NEGATIVE
BACTERIA: NEGATIVE /HPF
BASOPHILS # BLD AUTO: 0.03 K/UL
BASOPHILS # BLD AUTO: 0.04 K/UL
BASOPHILS # BLD AUTO: 0.04 K/UL
BASOPHILS NFR BLD AUTO: 0.4 %
BASOPHILS NFR BLD AUTO: 0.5 %
BASOPHILS NFR BLD AUTO: 0.6 %
BASOPHILS NFR BLD AUTO: 0.6 %
BASOPHILS NFR BLD AUTO: 0.8 %
BILIRUB DIRECT SERPL-MCNC: 0 MG/DL
BILIRUB DIRECT SERPL-MCNC: 0.1 MG/DL
BILIRUB INDIRECT SERPL-MCNC: 0.1 MG/DL
BILIRUB INDIRECT SERPL-MCNC: 0.2 MG/DL
BILIRUB INDIRECT SERPL-MCNC: 0.4 MG/DL
BILIRUB INDIRECT SERPL-MCNC: 0.4 MG/DL
BILIRUB SERPL-MCNC: 0.2 MG/DL
BILIRUB SERPL-MCNC: 0.2 MG/DL
BILIRUB SERPL-MCNC: 0.4 MG/DL
BILIRUB SERPL-MCNC: 0.5 MG/DL
BILIRUBIN URINE: NEGATIVE
BLOOD URINE: NEGATIVE
BUN SERPL-MCNC: 13 MG/DL
BUN SERPL-MCNC: 13 MG/DL
BUN SERPL-MCNC: 14 MG/DL
BUN SERPL-MCNC: 17 MG/DL
BUN SERPL-MCNC: 9 MG/DL
CALCIUM SERPL-MCNC: 10 MG/DL
CALCIUM SERPL-MCNC: 10.2 MG/DL
CALCIUM SERPL-MCNC: 10.3 MG/DL
CALCIUM SERPL-MCNC: 9.6 MG/DL
CALCIUM SERPL-MCNC: 9.7 MG/DL
CALCIUM SERPL-MCNC: 9.7 MG/DL
CALCIUM SERPL-MCNC: 9.9 MG/DL
CAST: 0 /LPF
CHLORIDE SERPL-SCNC: 100 MMOL/L
CHLORIDE SERPL-SCNC: 102 MMOL/L
CHLORIDE SERPL-SCNC: 103 MMOL/L
CHLORIDE SERPL-SCNC: 103 MMOL/L
CHLORIDE SERPL-SCNC: 104 MMOL/L
CHLORIDE SERPL-SCNC: 104 MMOL/L
CHLORIDE SERPL-SCNC: 99 MMOL/L
CHOLEST SERPL-MCNC: 230 MG/DL
CHOLEST SERPL-MCNC: 234 MG/DL
CHOLEST SERPL-MCNC: 268 MG/DL
CHOLEST SERPL-MCNC: 272 MG/DL
CHOLEST SERPL-MCNC: 282 MG/DL
CHOLEST SERPL-MCNC: 309 MG/DL
CK SERPL-CCNC: 338 U/L
CK SERPL-CCNC: 376 U/L
CK SERPL-CCNC: 377 U/L
CK SERPL-CCNC: 516 U/L
CO2 SERPL-SCNC: 21 MMOL/L
CO2 SERPL-SCNC: 22 MMOL/L
CO2 SERPL-SCNC: 23 MMOL/L
CO2 SERPL-SCNC: 24 MMOL/L
CO2 SERPL-SCNC: 24 MMOL/L
CO2 SERPL-SCNC: 27 MMOL/L
CO2 SERPL-SCNC: 28 MMOL/L
COLOR: NORMAL
COLOR: NORMAL
COLOR: YELLOW
CREAT SERPL-MCNC: 1.24 MG/DL
CREAT SERPL-MCNC: 1.27 MG/DL
CREAT SERPL-MCNC: 1.28 MG/DL
CREAT SERPL-MCNC: 1.33 MG/DL
CREAT SERPL-MCNC: 1.33 MG/DL
CREAT SERPL-MCNC: 1.35 MG/DL
CREAT SERPL-MCNC: 1.47 MG/DL
CREAT SPEC-SCNC: 158 MG/DL
CREAT SPEC-SCNC: 214 MG/DL
CREAT SPEC-SCNC: 237 MG/DL
EGFR: 56 ML/MIN/1.73M2
EGFR: 62 ML/MIN/1.73M2
EGFR: 63 ML/MIN/1.73M2
EGFR: 66 ML/MIN/1.73M2
EGFR: 67 ML/MIN/1.73M2
EOSINOPHIL # BLD AUTO: 0.09 K/UL
EOSINOPHIL # BLD AUTO: 0.11 K/UL
EOSINOPHIL # BLD AUTO: 0.12 K/UL
EOSINOPHIL # BLD AUTO: 0.13 K/UL
EOSINOPHIL # BLD AUTO: 0.14 K/UL
EOSINOPHIL NFR BLD AUTO: 1.7 %
EOSINOPHIL NFR BLD AUTO: 1.8 %
EOSINOPHIL NFR BLD AUTO: 1.9 %
EOSINOPHIL NFR BLD AUTO: 2 %
EOSINOPHIL NFR BLD AUTO: 2.4 %
EPITHELIAL CELLS: 0 /HPF
ESTIMATED AVERAGE GLUCOSE: 143 MG/DL
ESTIMATED AVERAGE GLUCOSE: 146 MG/DL
ESTIMATED AVERAGE GLUCOSE: 154 MG/DL
ESTIMATED AVERAGE GLUCOSE: 160 MG/DL
ESTIMATED AVERAGE GLUCOSE: 177 MG/DL
ESTIMATED AVERAGE GLUCOSE: 189 MG/DL
FERRITIN SERPL-MCNC: 366 NG/ML
FERRITIN SERPL-MCNC: 428 NG/ML
FERRITIN SERPL-MCNC: 450 NG/ML
FERRITIN SERPL-MCNC: 532 NG/ML
FERRITIN SERPL-MCNC: 629 NG/ML
FOLATE SERPL-MCNC: 15 NG/ML
FOLATE SERPL-MCNC: 15 NG/ML
FOLATE SERPL-MCNC: 17.1 NG/ML
FOLATE SERPL-MCNC: 18.6 NG/ML
FOLATE SERPL-MCNC: 19.8 NG/ML
GLUCOSE QUALITATIVE U: ABNORMAL
GLUCOSE QUALITATIVE U: NEGATIVE
GLUCOSE QUALITATIVE U: NEGATIVE MG/DL
GLUCOSE SERPL-MCNC: 103 MG/DL
GLUCOSE SERPL-MCNC: 123 MG/DL
GLUCOSE SERPL-MCNC: 131 MG/DL
GLUCOSE SERPL-MCNC: 139 MG/DL
GLUCOSE SERPL-MCNC: 175 MG/DL
GLUCOSE SERPL-MCNC: 85 MG/DL
GLUCOSE SERPL-MCNC: 92 MG/DL
HAPTOGLOB SERPL-MCNC: 161 MG/DL
HBA1C MFR BLD HPLC: 6.6 %
HBA1C MFR BLD HPLC: 6.7 %
HBA1C MFR BLD HPLC: 7 %
HBA1C MFR BLD HPLC: 7.2 %
HBA1C MFR BLD HPLC: 7.8 %
HBA1C MFR BLD HPLC: 8.2 %
HCT VFR BLD CALC: 45.6 %
HCT VFR BLD CALC: 47.8 %
HCT VFR BLD CALC: 49.7 %
HCT VFR BLD CALC: 50 %
HCT VFR BLD CALC: 51.7 %
HDLC SERPL-MCNC: 37 MG/DL
HDLC SERPL-MCNC: 42 MG/DL
HDLC SERPL-MCNC: 42 MG/DL
HDLC SERPL-MCNC: 47 MG/DL
HDLC SERPL-MCNC: 48 MG/DL
HDLC SERPL-MCNC: 49 MG/DL
HGB BLD-MCNC: 14.1 G/DL
HGB BLD-MCNC: 15 G/DL
HGB BLD-MCNC: 15.3 G/DL
HGB BLD-MCNC: 15.4 G/DL
HGB BLD-MCNC: 16.2 G/DL
HYALINE CASTS: 0 /LPF
IMM GRANULOCYTES NFR BLD AUTO: 0 %
IMM GRANULOCYTES NFR BLD AUTO: 0 %
IMM GRANULOCYTES NFR BLD AUTO: 0.1 %
IMM GRANULOCYTES NFR BLD AUTO: 0.1 %
IMM GRANULOCYTES NFR BLD AUTO: 0.6 %
IRON SATN MFR SERPL: 16 %
IRON SATN MFR SERPL: 19 %
IRON SATN MFR SERPL: 20 %
IRON SATN MFR SERPL: 24 %
IRON SERPL-MCNC: 49 UG/DL
IRON SERPL-MCNC: 57 UG/DL
IRON SERPL-MCNC: 64 UG/DL
IRON SERPL-MCNC: 71 UG/DL
IRON SERPL-MCNC: 77 UG/DL
KETONES URINE: NEGATIVE
KETONES URINE: NEGATIVE MG/DL
LDH SERPL-CCNC: 183 U/L
LDLC SERPL CALC-MCNC: 145 MG/DL
LDLC SERPL CALC-MCNC: 175 MG/DL
LDLC SERPL CALC-MCNC: 204 MG/DL
LDLC SERPL CALC-MCNC: 205 MG/DL
LDLC SERPL CALC-MCNC: 209 MG/DL
LDLC SERPL CALC-MCNC: 237 MG/DL
LEUKOCYTE ESTERASE URINE: NEGATIVE
LYMPHOCYTES # BLD AUTO: 1.68 K/UL
LYMPHOCYTES # BLD AUTO: 2.13 K/UL
LYMPHOCYTES # BLD AUTO: 2.44 K/UL
LYMPHOCYTES # BLD AUTO: 2.64 K/UL
LYMPHOCYTES # BLD AUTO: 3.09 K/UL
LYMPHOCYTES NFR BLD AUTO: 33.4 %
LYMPHOCYTES NFR BLD AUTO: 37 %
LYMPHOCYTES NFR BLD AUTO: 39.1 %
LYMPHOCYTES NFR BLD AUTO: 43.2 %
LYMPHOCYTES NFR BLD AUTO: 44.8 %
MAGNESIUM SERPL-MCNC: 1.9 MG/DL
MAGNESIUM SERPL-MCNC: 1.9 MG/DL
MAGNESIUM SERPL-MCNC: 2 MG/DL
MAN DIFF?: NORMAL
MCHC RBC-ENTMCNC: 28.7 PG
MCHC RBC-ENTMCNC: 29.4 PG
MCHC RBC-ENTMCNC: 29.4 PG
MCHC RBC-ENTMCNC: 29.7 PG
MCHC RBC-ENTMCNC: 30.1 PG
MCHC RBC-ENTMCNC: 30.6 GM/DL
MCHC RBC-ENTMCNC: 30.9 GM/DL
MCHC RBC-ENTMCNC: 31 GM/DL
MCHC RBC-ENTMCNC: 31.3 GM/DL
MCHC RBC-ENTMCNC: 31.4 GM/DL
MCV RBC AUTO: 93.7 FL
MCV RBC AUTO: 93.8 FL
MCV RBC AUTO: 94.8 FL
MCV RBC AUTO: 96.1 FL
MCV RBC AUTO: 96.2 FL
MICROALBUMIN 24H UR DL<=1MG/L-MCNC: 1.7 MG/DL
MICROALBUMIN 24H UR DL<=1MG/L-MCNC: 8.8 MG/DL
MICROALBUMIN 24H UR DL<=1MG/L-MCNC: 9.3 MG/DL
MICROALBUMIN/CREAT 24H UR-RTO: 10 MG/G
MICROALBUMIN/CREAT 24H UR-RTO: 37 MG/G
MICROALBUMIN/CREAT 24H UR-RTO: 43 MG/G
MICROSCOPIC-UA: NORMAL
MONOCYTES # BLD AUTO: 0.49 K/UL
MONOCYTES # BLD AUTO: 0.55 K/UL
MONOCYTES # BLD AUTO: 0.58 K/UL
MONOCYTES # BLD AUTO: 0.61 K/UL
MONOCYTES # BLD AUTO: 0.75 K/UL
MONOCYTES NFR BLD AUTO: 10.5 %
MONOCYTES NFR BLD AUTO: 10.8 %
MONOCYTES NFR BLD AUTO: 10.9 %
MONOCYTES NFR BLD AUTO: 8.4 %
MONOCYTES NFR BLD AUTO: 9 %
NEUTROPHILS # BLD AUTO: 2.46 K/UL
NEUTROPHILS # BLD AUTO: 2.61 K/UL
NEUTROPHILS # BLD AUTO: 2.71 K/UL
NEUTROPHILS # BLD AUTO: 3.03 K/UL
NEUTROPHILS # BLD AUTO: 3.57 K/UL
NEUTROPHILS NFR BLD AUTO: 43.6 %
NEUTROPHILS NFR BLD AUTO: 44.1 %
NEUTROPHILS NFR BLD AUTO: 49.6 %
NEUTROPHILS NFR BLD AUTO: 50.2 %
NEUTROPHILS NFR BLD AUTO: 51.9 %
NITRITE URINE: NEGATIVE
NONHDLC SERPL-MCNC: 182 MG/DL
NONHDLC SERPL-MCNC: 192 MG/DL
NONHDLC SERPL-MCNC: 231 MG/DL
NONHDLC SERPL-MCNC: 231 MG/DL
NONHDLC SERPL-MCNC: 235 MG/DL
NONHDLC SERPL-MCNC: 261 MG/DL
PH URINE: 5.5
PH URINE: 6
PLATELET # BLD AUTO: 182 K/UL
PLATELET # BLD AUTO: 206 K/UL
PLATELET # BLD AUTO: 229 K/UL
PLATELET # BLD AUTO: 232 K/UL
PLATELET # BLD AUTO: 243 K/UL
POTASSIUM SERPL-SCNC: 4.1 MMOL/L
POTASSIUM SERPL-SCNC: 4.2 MMOL/L
POTASSIUM SERPL-SCNC: 4.2 MMOL/L
POTASSIUM SERPL-SCNC: 4.5 MMOL/L
POTASSIUM SERPL-SCNC: 4.5 MMOL/L
PROT SERPL-MCNC: 8 G/DL
PROT SERPL-MCNC: 8.1 G/DL
PROT SERPL-MCNC: 8.4 G/DL
PROT SERPL-MCNC: 8.6 G/DL
PROTEIN URINE: ABNORMAL
PROTEIN URINE: NEGATIVE MG/DL
PROTEIN URINE: NORMAL
PSA SERPL-MCNC: 0.82 NG/ML
RBC # BLD: 4.74 M/UL
RBC # BLD: 5.1 M/UL
RBC # BLD: 5.24 M/UL
RBC # BLD: 5.33 M/UL
RBC # BLD: 5.38 M/UL
RBC # FLD: 13 %
RBC # FLD: 13 %
RBC # FLD: 13.1 %
RBC # FLD: 13.2 %
RBC # FLD: 13.2 %
RED BLOOD CELLS URINE: 0 /HPF
RED BLOOD CELLS URINE: 0 /HPF
RED BLOOD CELLS URINE: 1 /HPF
SODIUM SERPL-SCNC: 138 MMOL/L
SODIUM SERPL-SCNC: 139 MMOL/L
SODIUM SERPL-SCNC: 140 MMOL/L
SODIUM SERPL-SCNC: 141 MMOL/L
SODIUM SERPL-SCNC: 142 MMOL/L
SPECIFIC GRAVITY URINE: 1.02
SQUAMOUS EPITHELIAL CELLS: 0 /HPF
T4 FREE SERPL-MCNC: 0.9 NG/DL
T4 FREE SERPL-MCNC: 0.9 NG/DL
T4 FREE SERPL-MCNC: 1 NG/DL
T4 FREE SERPL-MCNC: 1 NG/DL
T4 SERPL-MCNC: 5 UG/DL
TIBC SERPL-MCNC: 295 UG/DL
TIBC SERPL-MCNC: 316 UG/DL
TIBC SERPL-MCNC: 323 UG/DL
TIBC SERPL-MCNC: 326 UG/DL
TRANSFERRIN SERPL-MCNC: 239 MG/DL
TRANSFERRIN SERPL-MCNC: 245 MG/DL
TRANSFERRIN SERPL-MCNC: 269 MG/DL
TRANSFERRIN SERPL-MCNC: 270 MG/DL
TRANSFERRIN SERPL-MCNC: 272 MG/DL
TRIGL SERPL-MCNC: 122 MG/DL
TRIGL SERPL-MCNC: 129 MG/DL
TRIGL SERPL-MCNC: 131 MG/DL
TRIGL SERPL-MCNC: 133 MG/DL
TRIGL SERPL-MCNC: 184 MG/DL
TRIGL SERPL-MCNC: 83 MG/DL
TSH SERPL-ACNC: 0.97 UIU/ML
TSH SERPL-ACNC: 1.07 UIU/ML
TSH SERPL-ACNC: 1.08 UIU/ML
TSH SERPL-ACNC: 1.57 UIU/ML
TSH SERPL-ACNC: 2.14 UIU/ML
UIBC SERPL-MCNC: 238 UG/DL
UIBC SERPL-MCNC: 249 UG/DL
UIBC SERPL-MCNC: 259 UG/DL
UIBC SERPL-MCNC: 267 UG/DL
UROBILINOGEN URINE: 0.2 MG/DL
UROBILINOGEN URINE: ABNORMAL
UROBILINOGEN URINE: NORMAL
VIT B12 SERPL-MCNC: 1049 PG/ML
VIT B12 SERPL-MCNC: 620 PG/ML
VIT B12 SERPL-MCNC: 674 PG/ML
VIT B12 SERPL-MCNC: 709 PG/ML
WBC # FLD AUTO: 5.03 K/UL
WBC # FLD AUTO: 5.45 K/UL
WBC # FLD AUTO: 5.65 K/UL
WBC # FLD AUTO: 6.89 K/UL
WBC # FLD AUTO: 7.13 K/UL
WHITE BLOOD CELLS URINE: 0 /HPF
WHITE BLOOD CELLS URINE: 1 /HPF
WHITE BLOOD CELLS URINE: 1 /HPF

## 2024-03-24 PROBLEM — R74.8 ELEVATED CPK: Status: ACTIVE | Noted: 2022-10-28

## 2024-03-24 NOTE — PHYSICAL EXAM
[No Acute Distress] : no acute distress [Well Nourished] : well nourished [Well Developed] : well developed [Normal Sclera/Conjunctiva] : normal sclera/conjunctiva [Well-Appearing] : well-appearing [Normal Oropharynx] : the oropharynx was normal [Normal Outer Ear/Nose] : the outer ears and nose were normal in appearance [No Lymphadenopathy] : no lymphadenopathy [No JVD] : no jugular venous distention [Supple] : supple [No Respiratory Distress] : no respiratory distress  [No Accessory Muscle Use] : no accessory muscle use [Clear to Auscultation] : lungs were clear to auscultation bilaterally [Normal Rate] : normal rate  [Regular Rhythm] : with a regular rhythm [Normal S1, S2] : normal S1 and S2 [No Murmur] : no murmur heard [No Carotid Bruits] : no carotid bruits [No Varicosities] : no varicosities [Pedal Pulses Present] : the pedal pulses are present [Soft] : abdomen soft [No Extremity Clubbing/Cyanosis] : no extremity clubbing/cyanosis [Non Tender] : non-tender [Non-distended] : non-distended [Normal Bowel Sounds] : normal bowel sounds [Normal Anterior Cervical Nodes] : no anterior cervical lymphadenopathy [No CVA Tenderness] : no CVA  tenderness [No Spinal Tenderness] : no spinal tenderness [No Joint Swelling] : no joint swelling [No Rash] : no rash [Grossly Normal Strength/Tone] : grossly normal strength/tone [No Focal Deficits] : no focal deficits [Coordination Grossly Intact] : coordination grossly intact [Normal Gait] : normal gait [Alert and Oriented x3] : oriented to person, place, and time [de-identified] : trace ankle edema bilaterally, improved from prior

## 2024-03-24 NOTE — HISTORY OF PRESENT ILLNESS
[FreeTextEntry1] : 2 week f/u  [de-identified] : This is a 55 y/o male with a pmhx of Covid s/p intubation/extubation afib (on eliquis) hx of DVT (on eliquis) DM HTN HLD anemia coming in today for 2 week f/u. Saw Pt 2 weeks ago and was on lasix 40mg for pulm vasc congestion on cXR. Given his Cr uptrending to 1.6, instructed Pt to lower lasix to 40mg/20mg alternating from 40mg daily.  Since then, Pt reports his breathing and legg swelling have much improved Repeat CXR on 3/9 showed clear lungs. His cpk improved to 294 from 700s. Denies chest pain, SOB, VASQUEZ, dizziness, diaphoresis, palpitations, LE swelling, orthopnea, syncope, n/v, headache.

## 2024-03-24 NOTE — HEALTH RISK ASSESSMENT
[0] : 2) Feeling down, depressed, or hopeless: Not at all (0) [PHQ-2 Negative - No further assessment needed] : PHQ-2 Negative - No further assessment needed [Never] : Never [[id number]] : 0

## 2024-03-25 LAB
ANION GAP SERPL CALC-SCNC: 16 MMOL/L
BUN SERPL-MCNC: 12 MG/DL
CALCIUM SERPL-MCNC: 9.6 MG/DL
CHLORIDE SERPL-SCNC: 101 MMOL/L
CO2 SERPL-SCNC: 24 MMOL/L
CREAT SERPL-MCNC: 1.52 MG/DL
EGFR: 53 ML/MIN/1.73M2
GLUCOSE SERPL-MCNC: 89 MG/DL
NT-PROBNP SERPL-MCNC: 115 PG/ML
POTASSIUM SERPL-SCNC: 4.3 MMOL/L
SODIUM SERPL-SCNC: 141 MMOL/L

## 2024-03-27 ENCOUNTER — NON-APPOINTMENT (OUTPATIENT)
Age: 54
End: 2024-03-27

## 2024-03-27 LAB
APPEARANCE: CLEAR
BACTERIA: NEGATIVE /HPF
BILIRUBIN URINE: NEGATIVE
BLOOD URINE: NEGATIVE
CAST: 0 /LPF
COLOR: YELLOW
EPITHELIAL CELLS: 0 /HPF
GLUCOSE QUALITATIVE U: NEGATIVE MG/DL
KETONES URINE: NEGATIVE MG/DL
LEUKOCYTE ESTERASE URINE: NEGATIVE
MICROSCOPIC-UA: NORMAL
NITRITE URINE: NEGATIVE
PH URINE: 5.5
PROTEIN URINE: NEGATIVE MG/DL
RED BLOOD CELLS URINE: 0 /HPF
SPECIFIC GRAVITY URINE: 1.02
UROBILINOGEN URINE: 0.2 MG/DL
WHITE BLOOD CELLS URINE: 0 /HPF

## 2024-04-08 NOTE — ED PROVIDER NOTE - WEIGHT BEARING
Pt BIBA from [facility] s/p unwitnessed fall with right "acute  fracture at the base of the olecranon"  Not on anticoags pt unable to sit or stand due to pain

## 2024-04-09 ENCOUNTER — APPOINTMENT (OUTPATIENT)
Dept: PULMONOLOGY | Facility: CLINIC | Age: 54
End: 2024-04-09
Payer: COMMERCIAL

## 2024-04-09 VITALS
HEART RATE: 74 BPM | DIASTOLIC BLOOD PRESSURE: 70 MMHG | WEIGHT: 315 LBS | OXYGEN SATURATION: 97 % | RESPIRATION RATE: 16 BRPM | HEIGHT: 69 IN | BODY MASS INDEX: 46.65 KG/M2 | SYSTOLIC BLOOD PRESSURE: 101 MMHG

## 2024-04-09 DIAGNOSIS — R07.9 CHEST PAIN, UNSPECIFIED: ICD-10-CM

## 2024-04-09 PROCEDURE — 94727 GAS DIL/WSHOT DETER LNG VOL: CPT

## 2024-04-09 PROCEDURE — 94010 BREATHING CAPACITY TEST: CPT

## 2024-04-09 PROCEDURE — 99204 OFFICE O/P NEW MOD 45 MIN: CPT | Mod: 25

## 2024-04-09 PROCEDURE — ZZZZZ: CPT

## 2024-04-09 PROCEDURE — 94729 DIFFUSING CAPACITY: CPT

## 2024-04-09 NOTE — REASON FOR VISIT
[Consultation] : a consultation [Shortness of Breath] : shortness of breath [TextBox_44] : Pulmonary evaluation [TextBox_13] : Dr. Gonsalo Aguilera

## 2024-04-09 NOTE — DISCUSSION/SUMMARY
[FreeTextEntry1] : Mr. JACKSON LAL is an 54 year old male, history of Hypertension, Hypercholesterolemia, Diabetes mellitus, atrial fibrillation, DVT.  Jackson has resolved chest pain and shortness of breath at this point, likely secondary to stress/anxiety.  Patient appears stable from a pulmonary perspective.

## 2024-04-09 NOTE — ADDENDUM
[FreeTextEntry1] : I, Carina Teixeirajustus, acted solely as a scribe for Dr. Ashlyn Wallace D.O., on this date 04/09/2024.   All medical record entries made by the Scribe were at my, Dr. Ashlyn Wallace D.O., direction and personally dictated by me on 04/09/2024. I have reviewed the chart and agree that the record accurately reflects my personal performance of the history, physical exam, assessment and plan. I have also personally directed, reviewed, and agreed with the chart.

## 2024-04-09 NOTE — CONSULT LETTER
[Dear  ___] : Dear  [unfilled], [Courtesy Letter:] : I had the pleasure of seeing your patient, [unfilled], in my office today. [Please see my note below.] : Please see my note below. [Consult Closing:] : Thank you very much for allowing me to participate in the care of this patient.  If you have any questions, please do not hesitate to contact me. [Sincerely,] : Sincerely, [FreeTextEntry3] : Dr. Ashlyn Wallace

## 2024-04-09 NOTE — PROCEDURE
[FreeTextEntry1] : Pulmonary Function Test obtained in office today which revealed: Spirometry: Within normal limits, Lung Volume: Within normal limits, Diffusion: Within normal limits. ___ Xray Chest 2 Views PA/Lat             Final  No Documents Attached    	 EXAM: [id number] - XR CHEST PA LAT 2V  - ORDERED BY: TARIQ SEQUEIRA   PROCEDURE DATE:  03/09/2024    INTERPRETATION:  PA and lateral chest radiographs  COMPARISON: 2/25/2024 chest x-ray.  CLINICAL INFORMATION: Congestion. Follow-up..  FINDINGS: CATHETERS AND TUBES: None  PULMONARY: The airway is midline. There are no airspace consolidations or radiographic evidence of pulmonary nodules.. No pleural effusion or pneumothorax.  HEART/VASCULAR: The heart size and mediastinum configuration are within the limits of normal.  BONES: The visualized osseous thorax is intact.  IMPRESSION:  No radiographic evidence of active chest disease..  --- End of Report ---       SHARDA VALLECILLO MD; Attending Radiologist This document has been electronically signed. Mar 10 2024 11:51AM  Ordered by: TARIQ SEQUEIRA       Collected/Examined: 09Mar2024 01:15PM       Verified by: TARIQ SEQUEIRA 11Mar2024 09:07AM       Result Communication: Call patient with results; Stage: Final       Performed at: [facility] at the [facility]       Resulted: 10Mar2024 11:48AM       Last Updated: 11Mar2024 09:07AM       Accession: [id number]

## 2024-04-09 NOTE — HISTORY OF PRESENT ILLNESS
[Never] : never [TextBox_4] : JACKSON LAL is a 54 year male, with history of Hypertension, Diabetes mellitus, Hypercholesterolemia, atrial fibrillation on Eliquis, DVT, who presents to the office for an initial evaluation. Patient reports of having symptoms of dyspnea and chest pain 2 weeks ago which has resolved. He denies of having any symptoms of a cough at the time. Patient denies of having any cigarette smoking history. He denies of having any known Hx of pulmonary diseases.

## 2024-04-09 NOTE — ASSESSMENT
[FreeTextEntry1] : Reviewed chest x-ray dated 03/09/2024 with patient today. Obtained and reviewed pulmonary function test results with patient today.  Continue Eliquis for history of atrial fibrillation. Return for pulmonary follow up on an as needed basis.

## 2024-05-14 ENCOUNTER — APPOINTMENT (OUTPATIENT)
Dept: CARDIOLOGY | Facility: CLINIC | Age: 54
End: 2024-05-14
Payer: COMMERCIAL

## 2024-05-14 ENCOUNTER — APPOINTMENT (OUTPATIENT)
Dept: ENDOCRINOLOGY | Facility: CLINIC | Age: 54
End: 2024-05-14
Payer: COMMERCIAL

## 2024-05-14 VITALS
RESPIRATION RATE: 17 BRPM | DIASTOLIC BLOOD PRESSURE: 78 MMHG | TEMPERATURE: 98.1 F | SYSTOLIC BLOOD PRESSURE: 118 MMHG | OXYGEN SATURATION: 94 % | HEART RATE: 102 BPM | HEIGHT: 69 IN | BODY MASS INDEX: 46.65 KG/M2 | WEIGHT: 315 LBS

## 2024-05-14 DIAGNOSIS — E78.5 HYPERLIPIDEMIA, UNSPECIFIED: ICD-10-CM

## 2024-05-14 DIAGNOSIS — I10 ESSENTIAL (PRIMARY) HYPERTENSION: ICD-10-CM

## 2024-05-14 DIAGNOSIS — I48.91 UNSPECIFIED ATRIAL FIBRILLATION: ICD-10-CM

## 2024-05-14 DIAGNOSIS — E11.9 TYPE 2 DIABETES MELLITUS W/OUT COMPLICATIONS: ICD-10-CM

## 2024-05-14 DIAGNOSIS — R09.89 OTHER SPECIFIED SYMPTOMS AND SIGNS INVOLVING THE CIRCULATORY AND RESPIRATORY SYSTEMS: ICD-10-CM

## 2024-05-14 DIAGNOSIS — E66.9 OBESITY, UNSPECIFIED: ICD-10-CM

## 2024-05-14 DIAGNOSIS — Z13.228 ENCOUNTER FOR SCREENING FOR OTHER METABOLIC DISORDERS: ICD-10-CM

## 2024-05-14 DIAGNOSIS — R79.89 OTHER SPECIFIED ABNORMAL FINDINGS OF BLOOD CHEMISTRY: ICD-10-CM

## 2024-05-14 DIAGNOSIS — R06.02 SHORTNESS OF BREATH: ICD-10-CM

## 2024-05-14 DIAGNOSIS — E66.01 MORBID (SEVERE) OBESITY DUE TO EXCESS CALORIES: ICD-10-CM

## 2024-05-14 DIAGNOSIS — I25.10 ATHEROSCLEROTIC HEART DISEASE OF NATIVE CORONARY ARTERY W/OUT ANGINA PECTORIS: ICD-10-CM

## 2024-05-14 DIAGNOSIS — I82.409 ACUTE EMBOLISM AND THROMBOSIS OF UNSPECIFIED DEEP VEINS OF UNSPECIFIED LOWER EXTREMITY: ICD-10-CM

## 2024-05-14 LAB
GLUCOSE BLDC GLUCOMTR-MCNC: 249
HBA1C MFR BLD HPLC: 7.4

## 2024-05-14 PROCEDURE — 82962 GLUCOSE BLOOD TEST: CPT

## 2024-05-14 PROCEDURE — 93000 ELECTROCARDIOGRAM COMPLETE: CPT

## 2024-05-14 PROCEDURE — 99214 OFFICE O/P EST MOD 30 MIN: CPT

## 2024-05-14 PROCEDURE — 99214 OFFICE O/P EST MOD 30 MIN: CPT | Mod: 25

## 2024-05-14 PROCEDURE — 83036 HEMOGLOBIN GLYCOSYLATED A1C: CPT | Mod: QW

## 2024-05-14 PROCEDURE — G2211 COMPLEX E/M VISIT ADD ON: CPT | Mod: NC,1L

## 2024-05-14 RX ORDER — FELODIPINE 5 MG/1
5 TABLET, EXTENDED RELEASE ORAL
Qty: 180 | Refills: 2 | Status: ACTIVE | COMMUNITY
Start: 2020-12-23 | End: 1900-01-01

## 2024-05-14 RX ORDER — APIXABAN 5 MG/1
5 TABLET, FILM COATED ORAL
Qty: 90 | Refills: 1 | Status: ACTIVE | COMMUNITY
Start: 1900-01-01 | End: 1900-01-01

## 2024-05-14 RX ORDER — TIRZEPATIDE 5 MG/.5ML
5 INJECTION, SOLUTION SUBCUTANEOUS
Qty: 12 | Refills: 0 | Status: ACTIVE | COMMUNITY
Start: 2024-03-22 | End: 1900-01-01

## 2024-05-14 NOTE — HISTORY OF PRESENT ILLNESS
[FreeTextEntry1] : This is a 53 y/o male with a pmhx of covid s/p intubation/extubation afib (on eliquis) hx of DVT (on eliquis) DM HTN HLD anemia coming in today for follow up. Patient reports he had back pain for a few weeks which resolved. Patient reports SOB has improved. Patient denies chest pain, dyspnea, palpitations, dizziness, syncope, changes in bowel/bladder habits or appetite.

## 2024-05-14 NOTE — REVIEW OF SYSTEMS
Onset:  20   Location/Description:  25 yo, back/ abd pain, pregn       Full Name of Provider seen for current symptoms:  Gisele CRAWFORD MD     Symptoms: WI- back pain that's coming into stomach      Pregnant (If Yes, how long?):18  weeks  4 day    Pt c/o Sharp pain to middle  of back near her shoulder blades.  The pain  Shoots around to Upper stomach,  Back and stomach  pain is 8/10.   Back pain became more  intense 2 hr ago, at  6 pm.   Pain is constant, but sharpness intensifies here and there.      Cramping/Contractions: abd sharpness  pain maybe  4 min apart lasting a min   Vaginal Drainage/Bleeding:   None   Fetus Active?   Can feel some flutters, has been having lower stomach cramping, not severe, it is minor cramping     No fever     Is with pt   Pain Hurts more when  laying  Down , not as intense  When walking   Not severe abd pain, it is moderate     Pt's  is already   driving  Pt to [facility] now,  They left a few mins  ago at 735 pm , they are  about 20 mins away.     Precipitating Factors:  unkn , pregn   Pain Scale (1-10), 10 highest: 8/10  Associated Symptoms:  Upper abd/ mid back pain, cramping  LMP: Patient's last menstrual period was 2021.  EDC:  9/10/2022  Gestational Age:  18w4d  Blood Type: A Rh Negative  OB History:    OB History    Para Term  AB Living   1 0 0 0 0 0   SAB IAB Ectopic Molar Multiple Live Births   0 0 0 0 0 0        Vaginal/C Section:   0/0  Group B Strep (pos or neg):   Too early   History of previous Labor & Delivery:  Na, see chart   Recent visits (last 3-4 weeks) for same reason or recent surgery:   Na  3/24/22 ob check     Hospital:  [facility]   Travel time : 25 mins   Hinds MD : Gisele Urias V  OB     PLAN:  Directed to Emergency Department    Patient/Caller agrees to follow recommendations.  Already on their way to the Er.     Reason for Disposition  • MODERATE-SEVERE abdominal pain (e.g., interferes with normal activities,  awakens from sleep)    Protocols used: PREGNANCY - ABDOMINAL PAIN LESS THAN 20 WEEKS EGA-A-AH       [Negative] : Heme/Lymph

## 2024-05-14 NOTE — HISTORY OF PRESENT ILLNESS
[FreeTextEntry1] : 54 year M here for f/up for Type 2 diabetes mellitus and obesity   no new complaints   Screening  Podiatry: follows LDL:  212, prescribed statin    EGFR: 50 microalbumin: cr: -ve  Current diabetic medication regimen (verified with patient): glipizide ER 2.5mg po daily  mounjaro 2.5mg Q weekly   Prior intolerance to metformin (dizziness, diarrhea)    SMBG: not performed  No recent reported hypoglycemia    Obesity:  No appreciable weight loss with rybelsus ---> transitioned to mounjaro around 3.2024

## 2024-05-17 LAB
25(OH)D3 SERPL-MCNC: 26 NG/ML
ALBUMIN SERPL ELPH-MCNC: 4.4 G/DL
ALP BLD-CCNC: 121 U/L
ALT SERPL-CCNC: 12 U/L
ANION GAP SERPL CALC-SCNC: 16 MMOL/L
AST SERPL-CCNC: 12 U/L
BASOPHILS # BLD AUTO: 0.06 K/UL
BASOPHILS NFR BLD AUTO: 0.8 %
BILIRUB DIRECT SERPL-MCNC: 0.1 MG/DL
BILIRUB INDIRECT SERPL-MCNC: 0.2 MG/DL
BILIRUB SERPL-MCNC: 0.3 MG/DL
BUN SERPL-MCNC: 14 MG/DL
CALCIUM SERPL-MCNC: 9.6 MG/DL
CHLORIDE SERPL-SCNC: 101 MMOL/L
CHOLEST SERPL-MCNC: 276 MG/DL
CK SERPL-CCNC: 339 U/L
CO2 SERPL-SCNC: 21 MMOL/L
CREAT SERPL-MCNC: 1.35 MG/DL
CREAT SPEC-SCNC: 224 MG/DL
EGFR: 62 ML/MIN/1.73M2
EOSINOPHIL # BLD AUTO: 0.14 K/UL
EOSINOPHIL NFR BLD AUTO: 1.9 %
ESTIMATED AVERAGE GLUCOSE: 157 MG/DL
GLUCOSE SERPL-MCNC: 123 MG/DL
HBA1C MFR BLD HPLC: 7.1 %
HCT VFR BLD CALC: 50.2 %
HDLC SERPL-MCNC: 43 MG/DL
HGB BLD-MCNC: 16.1 G/DL
IMM GRANULOCYTES NFR BLD AUTO: 0.4 %
LDLC SERPL CALC-MCNC: 199 MG/DL
LYMPHOCYTES # BLD AUTO: 2.95 K/UL
LYMPHOCYTES NFR BLD AUTO: 39.5 %
MAGNESIUM SERPL-MCNC: 2 MG/DL
MAN DIFF?: NORMAL
MCHC RBC-ENTMCNC: 29.6 PG
MCHC RBC-ENTMCNC: 32.1 GM/DL
MCV RBC AUTO: 92.3 FL
MICROALBUMIN 24H UR DL<=1MG/L-MCNC: 3.1 MG/DL
MICROALBUMIN/CREAT 24H UR-RTO: 14 MG/G
MONOCYTES # BLD AUTO: 0.89 K/UL
MONOCYTES NFR BLD AUTO: 11.9 %
NEUTROPHILS # BLD AUTO: 3.39 K/UL
NEUTROPHILS NFR BLD AUTO: 45.5 %
NONHDLC SERPL-MCNC: 233 MG/DL
NT-PROBNP SERPL-MCNC: <36 PG/ML
PLATELET # BLD AUTO: 230 K/UL
POTASSIUM SERPL-SCNC: 4.4 MMOL/L
PROT SERPL-MCNC: 8 G/DL
RBC # BLD: 5.44 M/UL
RBC # FLD: 13.4 %
SODIUM SERPL-SCNC: 138 MMOL/L
T4 FREE SERPL-MCNC: 0.9 NG/DL
TRIGL SERPL-MCNC: 179 MG/DL
TSH SERPL-ACNC: 1.72 UIU/ML
WBC # FLD AUTO: 7.46 K/UL

## 2024-05-28 RX ORDER — FUROSEMIDE 40 MG/1
40 TABLET ORAL
Qty: 90 | Refills: 0 | Status: ACTIVE | COMMUNITY
Start: 2024-02-27 | End: 1900-01-01

## 2024-08-09 ENCOUNTER — APPOINTMENT (OUTPATIENT)
Dept: CARDIOLOGY | Facility: CLINIC | Age: 54
End: 2024-08-09

## 2024-08-09 PROBLEM — E55.9 VITAMIN D DEFICIENCY: Status: ACTIVE | Noted: 2024-08-09

## 2024-08-09 PROCEDURE — G2211 COMPLEX E/M VISIT ADD ON: CPT | Mod: NC

## 2024-08-09 PROCEDURE — 93000 ELECTROCARDIOGRAM COMPLETE: CPT

## 2024-08-09 PROCEDURE — 99214 OFFICE O/P EST MOD 30 MIN: CPT | Mod: 25

## 2024-08-09 NOTE — HISTORY OF PRESENT ILLNESS
[FreeTextEntry1] : his is a 55 y/o male with a pmhx of covid s/p intubation/extubation afib (on eliquis) hx of DVT (on eliquis) DM HTN HLD anemia coming in today for follow up. Patient reports dyspnea has improved.  Patient denies chest pain, palpitations, dizziness, syncope, changes in bowel/bladder habits or appetite.

## 2024-09-13 ENCOUNTER — APPOINTMENT (OUTPATIENT)
Dept: ENDOCRINOLOGY | Facility: CLINIC | Age: 54
End: 2024-09-13
Payer: COMMERCIAL

## 2024-09-13 VITALS
WEIGHT: 315 LBS | HEIGHT: 66 IN | TEMPERATURE: 97.5 F | HEART RATE: 78 BPM | DIASTOLIC BLOOD PRESSURE: 82 MMHG | BODY MASS INDEX: 50.62 KG/M2 | RESPIRATION RATE: 18 BRPM | OXYGEN SATURATION: 91 % | SYSTOLIC BLOOD PRESSURE: 130 MMHG

## 2024-09-13 DIAGNOSIS — E66.01 MORBID (SEVERE) OBESITY DUE TO EXCESS CALORIES: ICD-10-CM

## 2024-09-13 DIAGNOSIS — E11.9 TYPE 2 DIABETES MELLITUS W/OUT COMPLICATIONS: ICD-10-CM

## 2024-09-13 LAB — GLUCOSE BLDC GLUCOMTR-MCNC: 306

## 2024-09-13 PROCEDURE — 82962 GLUCOSE BLOOD TEST: CPT

## 2024-09-13 PROCEDURE — 99214 OFFICE O/P EST MOD 30 MIN: CPT

## 2024-09-13 NOTE — HISTORY OF PRESENT ILLNESS
[FreeTextEntry1] :  f/up for Type 2 diabetes mellitus and obesity   no new complaints   Screening  Podiatry: follows LDL:  212, prescribed statin    EGFR: 66 microalbumin: cr: -ve  Current diabetic medication regimen (verified with patient): glipizide ER 2.5mg po daily  mounjaro 5mg Q weekly (patient intermittently adherent, then self d/c---> no reported v/ abdominal pain, he reported he did not like the feeling of nausea)  He is willing to resume mounjaro for weight loss and metabolic benefits    Prior intolerance to metformin (dizziness, diarrhea)    SMBG: not performed  No recent reported hypoglycemia    Obesity:  No appreciable weight loss with rybelsus ---> transitioned to mounjaro around 3.2024---> patient them self d/c

## 2024-09-17 NOTE — CONSULT NOTE ADULT - EXTREMITIES COMMENTS
[FreeTextEntry1] :  Documented by Se Fisher acting as scribe for Dr. Meier on 09/17/2024. All Medical record entries made by the Scribe were at my, Dr. Meier, direction and personally dictated by me on 09/17/2024 . I have reviewed the chart and agree that the record accurately reflects my personal performance of the history, physical exam, assessment and plan. I have also personally directed, reviewed, and agreed with the discharge instructions. Lt thigh incision with drains with slight induration on edges with packing

## 2024-12-13 ENCOUNTER — APPOINTMENT (OUTPATIENT)
Dept: CARDIOLOGY | Facility: CLINIC | Age: 54
End: 2024-12-13
Payer: COMMERCIAL

## 2024-12-13 VITALS
DIASTOLIC BLOOD PRESSURE: 84 MMHG | WEIGHT: 311 LBS | TEMPERATURE: 98.3 F | HEIGHT: 69 IN | OXYGEN SATURATION: 96 % | SYSTOLIC BLOOD PRESSURE: 120 MMHG | HEART RATE: 71 BPM | BODY MASS INDEX: 46.06 KG/M2

## 2024-12-13 DIAGNOSIS — I48.91 UNSPECIFIED ATRIAL FIBRILLATION: ICD-10-CM

## 2024-12-13 DIAGNOSIS — E78.5 HYPERLIPIDEMIA, UNSPECIFIED: ICD-10-CM

## 2024-12-13 DIAGNOSIS — Z71.85 ENCOUNTER FOR IMMUNIZATION SAFETY COUNSELING: ICD-10-CM

## 2024-12-13 DIAGNOSIS — I25.10 ATHEROSCLEROTIC HEART DISEASE OF NATIVE CORONARY ARTERY W/OUT ANGINA PECTORIS: ICD-10-CM

## 2024-12-13 DIAGNOSIS — E55.9 VITAMIN D DEFICIENCY, UNSPECIFIED: ICD-10-CM

## 2024-12-13 DIAGNOSIS — I82.409 ACUTE EMBOLISM AND THROMBOSIS OF UNSPECIFIED DEEP VEINS OF UNSPECIFIED LOWER EXTREMITY: ICD-10-CM

## 2024-12-13 DIAGNOSIS — I10 ESSENTIAL (PRIMARY) HYPERTENSION: ICD-10-CM

## 2024-12-13 DIAGNOSIS — E11.9 TYPE 2 DIABETES MELLITUS W/OUT COMPLICATIONS: ICD-10-CM

## 2024-12-13 DIAGNOSIS — R79.89 OTHER SPECIFIED ABNORMAL FINDINGS OF BLOOD CHEMISTRY: ICD-10-CM

## 2024-12-13 DIAGNOSIS — Z13.228 ENCOUNTER FOR SCREENING FOR OTHER METABOLIC DISORDERS: ICD-10-CM

## 2024-12-13 DIAGNOSIS — E66.9 OBESITY, UNSPECIFIED: ICD-10-CM

## 2024-12-13 PROCEDURE — 99214 OFFICE O/P EST MOD 30 MIN: CPT

## 2024-12-13 PROCEDURE — G2211 COMPLEX E/M VISIT ADD ON: CPT | Mod: NC

## 2024-12-13 PROCEDURE — 93000 ELECTROCARDIOGRAM COMPLETE: CPT

## 2024-12-15 LAB
25(OH)D3 SERPL-MCNC: 41.4 NG/ML
ALBUMIN SERPL ELPH-MCNC: 4.3 G/DL
ALP BLD-CCNC: 144 U/L
ALT SERPL-CCNC: 16 U/L
ANION GAP SERPL CALC-SCNC: 16 MMOL/L
APPEARANCE: CLEAR
AST SERPL-CCNC: 13 U/L
BACTERIA: NEGATIVE /HPF
BILIRUB DIRECT SERPL-MCNC: 0.2 MG/DL
BILIRUB INDIRECT SERPL-MCNC: 0.4 MG/DL
BILIRUB SERPL-MCNC: 0.6 MG/DL
BILIRUBIN URINE: NEGATIVE
BLOOD URINE: NEGATIVE
BUN SERPL-MCNC: 15 MG/DL
CALCIUM SERPL-MCNC: 9.9 MG/DL
CAST: 0 /LPF
CHLORIDE SERPL-SCNC: 99 MMOL/L
CHOLEST SERPL-MCNC: 304 MG/DL
CK SERPL-CCNC: 277 U/L
CO2 SERPL-SCNC: 24 MMOL/L
COLOR: YELLOW
CREAT SERPL-MCNC: 1.22 MG/DL
CREAT SPEC-SCNC: 192 MG/DL
EGFR: 70 ML/MIN/1.73M2
EPITHELIAL CELLS: 0 /HPF
ESTIMATED AVERAGE GLUCOSE: 243 MG/DL
GLUCOSE QUALITATIVE U: NEGATIVE MG/DL
GLUCOSE SERPL-MCNC: 172 MG/DL
HBA1C MFR BLD HPLC: 10.1 %
HDLC SERPL-MCNC: 37 MG/DL
KETONES URINE: NEGATIVE MG/DL
LDLC SERPL CALC-MCNC: 232 MG/DL
LEUKOCYTE ESTERASE URINE: ABNORMAL
MICROALBUMIN 24H UR DL<=1MG/L-MCNC: 1.9 MG/DL
MICROALBUMIN/CREAT 24H UR-RTO: 10 MG/G
MICROSCOPIC-UA: NORMAL
NITRITE URINE: NEGATIVE
NONHDLC SERPL-MCNC: 267 MG/DL
PH URINE: 5.5
POTASSIUM SERPL-SCNC: 4.4 MMOL/L
PROT SERPL-MCNC: 8.3 G/DL
PROTEIN URINE: NORMAL MG/DL
RED BLOOD CELLS URINE: 0 /HPF
SODIUM SERPL-SCNC: 140 MMOL/L
SPECIFIC GRAVITY URINE: 1.02
T4 FREE SERPL-MCNC: 1.1 NG/DL
TRIGL SERPL-MCNC: 175 MG/DL
TSH SERPL-ACNC: 1.29 UIU/ML
UROBILINOGEN URINE: 0.2 MG/DL
WHITE BLOOD CELLS URINE: 3 /HPF

## 2024-12-17 ENCOUNTER — APPOINTMENT (OUTPATIENT)
Dept: CARDIOLOGY | Facility: CLINIC | Age: 54
End: 2024-12-17
Payer: COMMERCIAL

## 2024-12-17 PROCEDURE — A9500: CPT

## 2024-12-17 PROCEDURE — 78452 HT MUSCLE IMAGE SPECT MULT: CPT

## 2024-12-17 PROCEDURE — 93015 CV STRESS TEST SUPVJ I&R: CPT

## 2024-12-26 DIAGNOSIS — R74.8 ABNORMAL LEVELS OF OTHER SERUM ENZYMES: ICD-10-CM

## 2024-12-31 RX ORDER — EZETIMIBE 10 MG/1
10 TABLET ORAL DAILY
Qty: 30 | Refills: 2 | Status: ACTIVE | COMMUNITY
Start: 2024-12-31 | End: 1900-01-01

## 2025-01-03 ENCOUNTER — APPOINTMENT (OUTPATIENT)
Dept: ENDOCRINOLOGY | Facility: CLINIC | Age: 55
End: 2025-01-03
Payer: COMMERCIAL

## 2025-01-03 ENCOUNTER — RESULT CHARGE (OUTPATIENT)
Age: 55
End: 2025-01-03

## 2025-01-03 VITALS
BODY MASS INDEX: 46.65 KG/M2 | WEIGHT: 315 LBS | TEMPERATURE: 97.6 F | RESPIRATION RATE: 18 BRPM | HEART RATE: 73 BPM | SYSTOLIC BLOOD PRESSURE: 145 MMHG | HEIGHT: 69 IN | DIASTOLIC BLOOD PRESSURE: 95 MMHG | OXYGEN SATURATION: 97 %

## 2025-01-03 DIAGNOSIS — E66.813 OBESITY, CLASS 3: ICD-10-CM

## 2025-01-03 DIAGNOSIS — E11.9 TYPE 2 DIABETES MELLITUS W/OUT COMPLICATIONS: ICD-10-CM

## 2025-01-03 LAB
GLUCOSE BLDC GLUCOMTR-MCNC: 112
HBA1C MFR BLD HPLC: ABNORMAL

## 2025-01-03 PROCEDURE — 99214 OFFICE O/P EST MOD 30 MIN: CPT

## 2025-01-03 PROCEDURE — 82962 GLUCOSE BLOOD TEST: CPT

## 2025-01-03 PROCEDURE — 83036 HEMOGLOBIN GLYCOSYLATED A1C: CPT | Mod: QW

## 2025-01-03 RX ORDER — BLOOD-GLUCOSE METER
W/DEVICE KIT MISCELLANEOUS
Qty: 1 | Refills: 0 | Status: ACTIVE | COMMUNITY
Start: 2025-01-03 | End: 1900-01-01

## 2025-01-03 RX ORDER — LANCETS 33 GAUGE
EACH MISCELLANEOUS
Qty: 2 | Refills: 2 | Status: ACTIVE | COMMUNITY
Start: 2025-01-03 | End: 1900-01-01

## 2025-01-03 RX ORDER — BLOOD-GLUCOSE METER
70 EACH MISCELLANEOUS
Qty: 3 | Refills: 3 | Status: ACTIVE | COMMUNITY
Start: 2025-01-03 | End: 1900-01-01

## 2025-01-03 RX ORDER — EMPAGLIFLOZIN 10 MG/1
10 TABLET, FILM COATED ORAL DAILY
Qty: 90 | Refills: 1 | Status: ACTIVE | COMMUNITY
Start: 2025-01-03 | End: 1900-01-01

## 2025-01-03 RX ORDER — GLUCOSAM/CHON-MSM1/C/MANG/BOSW 500-416.6
TABLET ORAL
Qty: 1 | Refills: 0 | Status: ACTIVE | COMMUNITY
Start: 2025-01-03 | End: 1900-01-01

## 2025-01-03 RX ORDER — BLOOD SUGAR DIAGNOSTIC
STRIP MISCELLANEOUS TWICE DAILY
Qty: 1 | Refills: 3 | Status: ACTIVE | COMMUNITY
Start: 2025-01-03 | End: 1900-01-01

## 2025-01-04 LAB
ALBUMIN SERPL ELPH-MCNC: 4.1 G/DL
ALP BLD-CCNC: 130 U/L
ALT SERPL-CCNC: 11 U/L
AST SERPL-CCNC: 16 U/L
BILIRUB DIRECT SERPL-MCNC: 0.1 MG/DL
BILIRUB INDIRECT SERPL-MCNC: 0.4 MG/DL
BILIRUB SERPL-MCNC: 0.5 MG/DL
GGT SERPL-CCNC: 36 U/L
PROT SERPL-MCNC: 7.4 G/DL

## 2025-01-06 ENCOUNTER — RESULT REVIEW (OUTPATIENT)
Age: 55
End: 2025-01-06

## 2025-01-10 NOTE — ED PROVIDER NOTE - NS CPE EDP [facility] COCCYX LOC
10-Rod-2025 21:48
no midline spine tenderness, no paraspinal tenderness, no erythema, no edema, no obvious deformity b/l.

## 2025-01-22 ENCOUNTER — NON-APPOINTMENT (OUTPATIENT)
Age: 55
End: 2025-01-22

## 2025-02-12 NOTE — PATIENT PROFILE ADULT. - NSTOBACCONEVERSMOKERY/N_GEN_A
2nd attempt-received referral-Attempted to contact patient to schedule an appointment with Dr. Gonzales. Patient has phone restrictions on his number. Looked at current office notes in Media and we had the wrong phone number in patient chart. I have corrected phone number. I have called patient about getting patient scheduled. No answer NO VM.   
No

## 2025-02-28 ENCOUNTER — APPOINTMENT (OUTPATIENT)
Dept: CARDIOLOGY | Facility: CLINIC | Age: 55
End: 2025-02-28

## 2025-03-16 ENCOUNTER — OUTPATIENT (OUTPATIENT)
Dept: OUTPATIENT SERVICES | Facility: HOSPITAL | Age: 55
LOS: 1 days | End: 2025-03-16
Payer: COMMERCIAL

## 2025-03-16 DIAGNOSIS — Z98.890 OTHER SPECIFIED POSTPROCEDURAL STATES: Chronic | ICD-10-CM

## 2025-03-16 DIAGNOSIS — R74.8 ABNORMAL LEVELS OF OTHER SERUM ENZYMES: ICD-10-CM

## 2025-03-16 PROCEDURE — 76700 US EXAM ABDOM COMPLETE: CPT

## 2025-03-25 ENCOUNTER — NON-APPOINTMENT (OUTPATIENT)
Age: 55
End: 2025-03-25

## 2025-03-25 DIAGNOSIS — K76.0 FATTY (CHANGE OF) LIVER, NOT ELSEWHERE CLASSIFIED: ICD-10-CM

## 2025-05-14 ENCOUNTER — NON-APPOINTMENT (OUTPATIENT)
Age: 55
End: 2025-05-14

## 2025-05-16 ENCOUNTER — APPOINTMENT (OUTPATIENT)
Dept: CARDIOLOGY | Facility: CLINIC | Age: 55
End: 2025-05-16
Payer: COMMERCIAL

## 2025-05-16 VITALS
OXYGEN SATURATION: 93 % | BODY MASS INDEX: 45.47 KG/M2 | HEART RATE: 79 BPM | WEIGHT: 307 LBS | HEIGHT: 69 IN | DIASTOLIC BLOOD PRESSURE: 90 MMHG | SYSTOLIC BLOOD PRESSURE: 132 MMHG | TEMPERATURE: 98.4 F

## 2025-05-16 DIAGNOSIS — I48.91 UNSPECIFIED ATRIAL FIBRILLATION: ICD-10-CM

## 2025-05-16 DIAGNOSIS — R06.02 SHORTNESS OF BREATH: ICD-10-CM

## 2025-05-16 DIAGNOSIS — R80.9 PROTEINURIA, UNSPECIFIED: ICD-10-CM

## 2025-05-16 DIAGNOSIS — M79.10 MYALGIA, UNSPECIFIED SITE: ICD-10-CM

## 2025-05-16 DIAGNOSIS — K76.0 FATTY (CHANGE OF) LIVER, NOT ELSEWHERE CLASSIFIED: ICD-10-CM

## 2025-05-16 DIAGNOSIS — I25.10 ATHEROSCLEROTIC HEART DISEASE OF NATIVE CORONARY ARTERY W/OUT ANGINA PECTORIS: ICD-10-CM

## 2025-05-16 DIAGNOSIS — R74.8 ABNORMAL LEVELS OF OTHER SERUM ENZYMES: ICD-10-CM

## 2025-05-16 DIAGNOSIS — R79.89 OTHER SPECIFIED ABNORMAL FINDINGS OF BLOOD CHEMISTRY: ICD-10-CM

## 2025-05-16 DIAGNOSIS — E11.9 TYPE 2 DIABETES MELLITUS W/OUT COMPLICATIONS: ICD-10-CM

## 2025-05-16 DIAGNOSIS — I82.409 ACUTE EMBOLISM AND THROMBOSIS OF UNSPECIFIED DEEP VEINS OF UNSPECIFIED LOWER EXTREMITY: ICD-10-CM

## 2025-05-16 DIAGNOSIS — E66.9 OBESITY, UNSPECIFIED: ICD-10-CM

## 2025-05-16 DIAGNOSIS — Z13.228 ENCOUNTER FOR SCREENING FOR OTHER METABOLIC DISORDERS: ICD-10-CM

## 2025-05-16 DIAGNOSIS — I10 ESSENTIAL (PRIMARY) HYPERTENSION: ICD-10-CM

## 2025-05-16 DIAGNOSIS — E78.5 HYPERLIPIDEMIA, UNSPECIFIED: ICD-10-CM

## 2025-05-16 DIAGNOSIS — I77.810 THORACIC AORTIC ECTASIA: ICD-10-CM

## 2025-05-16 PROCEDURE — G2211 COMPLEX E/M VISIT ADD ON: CPT | Mod: NC

## 2025-05-16 PROCEDURE — 99214 OFFICE O/P EST MOD 30 MIN: CPT

## 2025-05-16 PROCEDURE — 93000 ELECTROCARDIOGRAM COMPLETE: CPT

## 2025-05-19 ENCOUNTER — RESULT REVIEW (OUTPATIENT)
Age: 55
End: 2025-05-19

## 2025-05-19 LAB
ALBUMIN SERPL ELPH-MCNC: 4.2 G/DL
ALP BLD-CCNC: 108 U/L
ALT SERPL-CCNC: 11 U/L
ANION GAP SERPL CALC-SCNC: 16 MMOL/L
APPEARANCE: CLEAR
AST SERPL-CCNC: 18 U/L
BACTERIA: NEGATIVE /HPF
BASOPHILS # BLD AUTO: 0.03 K/UL
BASOPHILS NFR BLD AUTO: 0.6 %
BILIRUB DIRECT SERPL-MCNC: 0.1 MG/DL
BILIRUB INDIRECT SERPL-MCNC: 0.3 MG/DL
BILIRUB SERPL-MCNC: 0.4 MG/DL
BILIRUBIN URINE: NEGATIVE
BLOOD URINE: NEGATIVE
BUN SERPL-MCNC: 12 MG/DL
CALCIUM SERPL-MCNC: 9.6 MG/DL
CAST: 2 /LPF
CCP AB SER IA-ACNC: <8 U/ML
CHLORIDE SERPL-SCNC: 103 MMOL/L
CHOLEST SERPL-MCNC: 250 MG/DL
CK SERPL-CCNC: 202 U/L
CO2 SERPL-SCNC: 24 MMOL/L
COLOR: YELLOW
CREAT SERPL-MCNC: 1.3 MG/DL
CREAT SPEC-SCNC: 138 MG/DL
CRP SERPL-MCNC: 8 MG/L
DSDNA AB SER-ACNC: 3 IU/ML
EGFRCR SERPLBLD CKD-EPI 2021: 64 ML/MIN/1.73M2
EOSINOPHIL # BLD AUTO: 0.12 K/UL
EOSINOPHIL NFR BLD AUTO: 2.5 %
EPITHELIAL CELLS: 0 /HPF
ERYTHROCYTE [SEDIMENTATION RATE] IN BLOOD BY WESTERGREN METHOD: 31 MM/HR
ESTIMATED AVERAGE GLUCOSE: 163 MG/DL
GLUCOSE QUALITATIVE U: >=1000 MG/DL
GLUCOSE SERPL-MCNC: 88 MG/DL
HBA1C MFR BLD HPLC: 7.3 %
HCT VFR BLD CALC: 49 %
HDLC SERPL-MCNC: 39 MG/DL
HGB BLD-MCNC: 15.4 G/DL
IMM GRANULOCYTES NFR BLD AUTO: 0.2 %
KETONES URINE: NEGATIVE MG/DL
LDLC SERPL-MCNC: 189 MG/DL
LEUKOCYTE ESTERASE URINE: NEGATIVE
LYMPHOCYTES # BLD AUTO: 1.71 K/UL
LYMPHOCYTES NFR BLD AUTO: 35.9 %
MAGNESIUM SERPL-MCNC: 2 MG/DL
MAN DIFF?: NORMAL
MCHC RBC-ENTMCNC: 29 PG
MCHC RBC-ENTMCNC: 31.4 G/DL
MCV RBC AUTO: 92.3 FL
MICROALBUMIN 24H UR DL<=1MG/L-MCNC: <1.2 MG/DL
MICROALBUMIN/CREAT 24H UR-RTO: NORMAL MG/G
MICROSCOPIC-UA: NORMAL
MONOCYTES # BLD AUTO: 0.56 K/UL
MONOCYTES NFR BLD AUTO: 11.8 %
NEUTROPHILS # BLD AUTO: 2.33 K/UL
NEUTROPHILS NFR BLD AUTO: 49 %
NITRITE URINE: NEGATIVE
NONHDLC SERPL-MCNC: 211 MG/DL
PH URINE: 5.5
PLATELET # BLD AUTO: 212 K/UL
POTASSIUM SERPL-SCNC: 4.2 MMOL/L
PROT SERPL-MCNC: 7.5 G/DL
PROTEIN URINE: NEGATIVE MG/DL
RBC # BLD: 5.31 M/UL
RBC # FLD: 13.7 %
RED BLOOD CELLS URINE: 0 /HPF
RF+CCP IGG SER-IMP: NEGATIVE
RHEUMATOID FACT SER QL: 22 IU/ML
SODIUM SERPL-SCNC: 142 MMOL/L
SPECIFIC GRAVITY URINE: 1.02
T4 FREE SERPL-MCNC: 0.9 NG/DL
TRIGL SERPL-MCNC: 123 MG/DL
TSH SERPL-ACNC: 1.25 UIU/ML
UROBILINOGEN URINE: 0.2 MG/DL
WBC # FLD AUTO: 4.76 K/UL
WHITE BLOOD CELLS URINE: 1 /HPF

## 2025-05-20 ENCOUNTER — NON-APPOINTMENT (OUTPATIENT)
Age: 55
End: 2025-05-20

## 2025-05-20 LAB — ANA SER IF-ACNC: NEGATIVE

## 2025-05-30 ENCOUNTER — APPOINTMENT (OUTPATIENT)
Dept: ENDOCRINOLOGY | Facility: CLINIC | Age: 55
End: 2025-05-30
Payer: COMMERCIAL

## 2025-05-30 VITALS
OXYGEN SATURATION: 96 % | TEMPERATURE: 97 F | HEIGHT: 69 IN | BODY MASS INDEX: 45.32 KG/M2 | DIASTOLIC BLOOD PRESSURE: 80 MMHG | WEIGHT: 306 LBS | SYSTOLIC BLOOD PRESSURE: 120 MMHG | HEART RATE: 78 BPM

## 2025-05-30 DIAGNOSIS — R70.0 ELEVATED ERYTHROCYTE SEDIMENTATION RATE: ICD-10-CM

## 2025-05-30 DIAGNOSIS — E11.9 TYPE 2 DIABETES MELLITUS W/OUT COMPLICATIONS: ICD-10-CM

## 2025-05-30 DIAGNOSIS — E66.813 OBESITY, CLASS 3: ICD-10-CM

## 2025-05-30 LAB — GLUCOSE BLDC GLUCOMTR-MCNC: 116

## 2025-05-30 PROCEDURE — 82962 GLUCOSE BLOOD TEST: CPT

## 2025-05-30 PROCEDURE — 99214 OFFICE O/P EST MOD 30 MIN: CPT

## 2025-05-30 RX ORDER — EVOLOCUMAB 140 MG/ML
140 INJECTION, SOLUTION SUBCUTANEOUS
Qty: 3 | Refills: 3 | Status: ACTIVE | COMMUNITY
Start: 2025-05-30 | End: 1900-01-01

## 2025-06-02 ENCOUNTER — APPOINTMENT (OUTPATIENT)
Dept: CARDIOLOGY | Facility: CLINIC | Age: 55
End: 2025-06-02
Payer: COMMERCIAL

## 2025-06-02 PROCEDURE — 93306 TTE W/DOPPLER COMPLETE: CPT
